# Patient Record
Sex: MALE | Race: WHITE | NOT HISPANIC OR LATINO
[De-identification: names, ages, dates, MRNs, and addresses within clinical notes are randomized per-mention and may not be internally consistent; named-entity substitution may affect disease eponyms.]

---

## 2017-03-15 ENCOUNTER — MESSAGE (OUTPATIENT)
Age: 67
End: 2017-03-15

## 2017-10-18 ENCOUNTER — APPOINTMENT (OUTPATIENT)
Age: 67
End: 2017-10-18
Payer: MEDICARE

## 2017-10-18 VITALS
WEIGHT: 244 LBS | HEIGHT: 73 IN | DIASTOLIC BLOOD PRESSURE: 86 MMHG | HEART RATE: 85 BPM | OXYGEN SATURATION: 96 % | SYSTOLIC BLOOD PRESSURE: 148 MMHG | BODY MASS INDEX: 32.34 KG/M2

## 2017-10-18 DIAGNOSIS — E73.9 LACTOSE INTOLERANCE, UNSPECIFIED: ICD-10-CM

## 2017-10-18 PROCEDURE — 99214 OFFICE O/P EST MOD 30 MIN: CPT

## 2017-10-19 LAB
ALBUMIN SERPL ELPH-MCNC: 4.5 G/DL
ALP BLD-CCNC: 42 U/L
ALT SERPL-CCNC: 68 U/L
ANION GAP SERPL CALC-SCNC: 11 MMOL/L
AST SERPL-CCNC: 38 U/L
BILIRUB DIRECT SERPL-MCNC: 0.3 MG/DL
BILIRUB SERPL-MCNC: 1.4 MG/DL
BUN SERPL-MCNC: 17 MG/DL
CALCIUM SERPL-MCNC: 9.2 MG/DL
CHLORIDE SERPL-SCNC: 103 MMOL/L
CO2 SERPL-SCNC: 28 MMOL/L
CREAT SERPL-MCNC: 1.09 MG/DL
GLUCOSE SERPL-MCNC: 114 MG/DL
HAV IGG+IGM SER QL: REACTIVE
POTASSIUM SERPL-SCNC: 4.6 MMOL/L
PROT SERPL-MCNC: 7.4 G/DL
SODIUM SERPL-SCNC: 142 MMOL/L

## 2017-12-19 ENCOUNTER — APPOINTMENT (OUTPATIENT)
Age: 67
End: 2017-12-19
Payer: MEDICARE

## 2017-12-19 PROCEDURE — 91200 LIVER ELASTOGRAPHY: CPT

## 2018-07-25 ENCOUNTER — NON-APPOINTMENT (OUTPATIENT)
Age: 68
End: 2018-07-25

## 2018-07-25 ENCOUNTER — APPOINTMENT (OUTPATIENT)
Dept: INTERNAL MEDICINE | Facility: CLINIC | Age: 68
End: 2018-07-25
Payer: MEDICARE

## 2018-07-25 VITALS
WEIGHT: 240 LBS | BODY MASS INDEX: 31.81 KG/M2 | RESPIRATION RATE: 15 BRPM | SYSTOLIC BLOOD PRESSURE: 123 MMHG | DIASTOLIC BLOOD PRESSURE: 68 MMHG | HEIGHT: 73 IN | TEMPERATURE: 97.8 F

## 2018-07-25 VITALS — HEART RATE: 75 BPM

## 2018-07-25 DIAGNOSIS — D36.9 BENIGN NEOPLASM, UNSPECIFIED SITE: ICD-10-CM

## 2018-07-25 PROCEDURE — 90471 IMMUNIZATION ADMIN: CPT | Mod: GY

## 2018-07-25 PROCEDURE — 90715 TDAP VACCINE 7 YRS/> IM: CPT | Mod: GY

## 2018-07-25 PROCEDURE — G0402 INITIAL PREVENTIVE EXAM: CPT

## 2018-07-25 PROCEDURE — 36415 COLL VENOUS BLD VENIPUNCTURE: CPT

## 2018-07-25 PROCEDURE — G0403: CPT

## 2018-07-25 NOTE — PHYSICAL EXAM
[No Acute Distress] : no acute distress [Normal Sclera/Conjunctiva] : normal sclera/conjunctiva [Normal Outer Ear/Nose] : the outer ears and nose were normal in appearance [Thyroid Normal, No Nodules] : the thyroid was normal and there were no nodules present [Clear to Auscultation] : lungs were clear to auscultation bilaterally [Normal Rate] : normal rate  [Regular Rhythm] : with a regular rhythm [No Carotid Bruits] : no carotid bruits [No Edema] : there was no peripheral edema [No Masses] : no abdominal mass palpated [Normal Posterior Cervical Nodes] : no posterior cervical lymphadenopathy [Normal Anterior Cervical Nodes] : no anterior cervical lymphadenopathy [Grossly Normal Strength/Tone] : grossly normal strength/tone [No Rash] : no rash [Coordination Grossly Intact] : coordination grossly intact [Normal Insight/Judgement] : insight and judgment were intact [Kyphosis] : no kyphosis

## 2018-07-25 NOTE — HEALTH RISK ASSESSMENT
[No falls in past year] : Patient reported no falls in the past year [HIV test declined] : HIV test declined [Fully functional (bathing, dressing, toileting, transferring, walking, feeding)] : Fully functional (bathing, dressing, toileting, transferring, walking, feeding) [Fully functional (using the telephone, shopping, preparing meals, housekeeping, doing laundry, using] : Fully functional and needs no help or supervision to perform IADLs (using the telephone, shopping, preparing meals, housekeeping, doing laundry, using transportation, managing medications and managing finances) [Discussed at today's visit] : Advance Directives Discussed at today's visit

## 2018-07-25 NOTE — PLAN
[FreeTextEntry1] : Check chol and A1C today.\par HTN controlled on losartan-hctz.\par RTO 4 mos fasting.\par HM is UTD.\par

## 2018-07-25 NOTE — HISTORY OF PRESENT ILLNESS
[FreeTextEntry1] : physical [de-identified] : Seeing endo about thyroid nodule.\ophelia Has derm appt today.\ophelia Had colonoscopy in May and found tubular adenoma.\par His son is having his first grandchild in September.

## 2018-07-30 ENCOUNTER — MESSAGE (OUTPATIENT)
Age: 68
End: 2018-07-30

## 2018-07-30 LAB
25(OH)D3 SERPL-MCNC: 43.4 NG/ML
ALBUMIN SERPL ELPH-MCNC: 4.8 G/DL
ALP BLD-CCNC: 53 U/L
ALT SERPL-CCNC: 47 U/L
ANION GAP SERPL CALC-SCNC: 17 MMOL/L
APPEARANCE: CLEAR
AST SERPL-CCNC: 36 U/L
BACTERIA: NEGATIVE
BASOPHILS # BLD AUTO: 0.02 K/UL
BASOPHILS NFR BLD AUTO: 0.3 %
BILIRUB SERPL-MCNC: 2.6 MG/DL
BILIRUBIN URINE: NEGATIVE
BLOOD URINE: NEGATIVE
BUN SERPL-MCNC: 31 MG/DL
CALCIUM SERPL-MCNC: 9 MG/DL
CHLORIDE SERPL-SCNC: 99 MMOL/L
CHOLEST SERPL-MCNC: 169 MG/DL
CHOLEST/HDLC SERPL: 3.9 RATIO
CO2 SERPL-SCNC: 25 MMOL/L
COLOR: YELLOW
CREAT SERPL-MCNC: 1.17 MG/DL
EOSINOPHIL # BLD AUTO: 0.14 K/UL
EOSINOPHIL NFR BLD AUTO: 2.2 %
GLUCOSE QUALITATIVE U: NEGATIVE MG/DL
GLUCOSE SERPL-MCNC: 115 MG/DL
HBA1C MFR BLD HPLC: 5.2 %
HCT VFR BLD CALC: 44.6 %
HDLC SERPL-MCNC: 43 MG/DL
HGB BLD-MCNC: 14.8 G/DL
IMM GRANULOCYTES NFR BLD AUTO: 0.3 %
KETONES URINE: NEGATIVE
LDLC SERPL CALC-MCNC: 108 MG/DL
LEUKOCYTE ESTERASE URINE: NEGATIVE
LYMPHOCYTES # BLD AUTO: 2.21 K/UL
LYMPHOCYTES NFR BLD AUTO: 34.1 %
MAN DIFF?: NORMAL
MCHC RBC-ENTMCNC: 31.3 PG
MCHC RBC-ENTMCNC: 33.2 GM/DL
MCV RBC AUTO: 94.3 FL
MICROSCOPIC-UA: NORMAL
MONOCYTES # BLD AUTO: 0.59 K/UL
MONOCYTES NFR BLD AUTO: 9.1 %
NEUTROPHILS # BLD AUTO: 3.51 K/UL
NEUTROPHILS NFR BLD AUTO: 54 %
NITRITE URINE: NEGATIVE
PH URINE: 5.5
PLATELET # BLD AUTO: 241 K/UL
POTASSIUM SERPL-SCNC: 4.2 MMOL/L
PROT SERPL-MCNC: 7.3 G/DL
PROTEIN URINE: NEGATIVE MG/DL
PSA SERPL-MCNC: 0.89 NG/ML
RBC # BLD: 4.73 M/UL
RBC # FLD: 14.1 %
RED BLOOD CELLS URINE: 1 /HPF
SODIUM SERPL-SCNC: 141 MMOL/L
SPECIFIC GRAVITY URINE: 1.02
SQUAMOUS EPITHELIAL CELLS: 0 /HPF
TRIGL SERPL-MCNC: 92 MG/DL
TSH SERPL-ACNC: 2.92 UIU/ML
UROBILINOGEN URINE: NEGATIVE MG/DL
VIT B12 SERPL-MCNC: 439 PG/ML
WBC # FLD AUTO: 6.49 K/UL
WHITE BLOOD CELLS URINE: 0 /HPF

## 2018-08-01 ENCOUNTER — TRANSCRIPTION ENCOUNTER (OUTPATIENT)
Age: 68
End: 2018-08-01

## 2018-09-12 ENCOUNTER — APPOINTMENT (OUTPATIENT)
Dept: HEPATOLOGY | Facility: CLINIC | Age: 68
End: 2018-09-12
Payer: MEDICARE

## 2018-09-12 VITALS
DIASTOLIC BLOOD PRESSURE: 83 MMHG | HEART RATE: 72 BPM | BODY MASS INDEX: 31.68 KG/M2 | OXYGEN SATURATION: 99 % | WEIGHT: 239 LBS | SYSTOLIC BLOOD PRESSURE: 162 MMHG | HEIGHT: 73 IN | TEMPERATURE: 98.1 F

## 2018-09-12 PROCEDURE — 99214 OFFICE O/P EST MOD 30 MIN: CPT

## 2018-11-06 ENCOUNTER — FORM ENCOUNTER (OUTPATIENT)
Age: 68
End: 2018-11-06

## 2018-11-07 ENCOUNTER — APPOINTMENT (OUTPATIENT)
Dept: ULTRASOUND IMAGING | Facility: CLINIC | Age: 68
End: 2018-11-07
Payer: MEDICARE

## 2018-11-07 ENCOUNTER — OUTPATIENT (OUTPATIENT)
Dept: OUTPATIENT SERVICES | Facility: HOSPITAL | Age: 68
LOS: 1 days | End: 2018-11-07
Payer: MEDICARE

## 2018-11-07 DIAGNOSIS — Z00.8 ENCOUNTER FOR OTHER GENERAL EXAMINATION: ICD-10-CM

## 2018-11-07 PROCEDURE — 76700 US EXAM ABDOM COMPLETE: CPT | Mod: 26

## 2018-11-07 PROCEDURE — 76700 US EXAM ABDOM COMPLETE: CPT

## 2018-11-14 ENCOUNTER — APPOINTMENT (OUTPATIENT)
Dept: INTERNAL MEDICINE | Facility: CLINIC | Age: 68
End: 2018-11-14

## 2018-11-14 ENCOUNTER — APPOINTMENT (OUTPATIENT)
Dept: INTERNAL MEDICINE | Facility: CLINIC | Age: 68
End: 2018-11-14
Payer: MEDICARE

## 2018-11-14 VITALS
HEIGHT: 73 IN | TEMPERATURE: 97.7 F | SYSTOLIC BLOOD PRESSURE: 142 MMHG | WEIGHT: 244 LBS | DIASTOLIC BLOOD PRESSURE: 82 MMHG | BODY MASS INDEX: 32.34 KG/M2

## 2018-11-14 VITALS — SYSTOLIC BLOOD PRESSURE: 150 MMHG | DIASTOLIC BLOOD PRESSURE: 90 MMHG

## 2018-11-14 PROCEDURE — 36415 COLL VENOUS BLD VENIPUNCTURE: CPT

## 2018-11-14 PROCEDURE — 99214 OFFICE O/P EST MOD 30 MIN: CPT | Mod: 25

## 2018-11-14 NOTE — HISTORY OF PRESENT ILLNESS
[FreeTextEntry1] : HTN, fatty liver [de-identified] : Saw derm and had skin cancer behind left ear.  Getting MOHS.\par Plays tennis 3 times per week.\par Drinks one beer per day.\par Seeing endo about thyroid nodule.\par Had colonoscopy in May and found tubular adenoma.\par His son is had his first grandchild in September.

## 2018-11-14 NOTE — PLAN
[FreeTextEntry1] : HTN not controlled, will add metoprolol 25mg at night.  Take losartan in AM.  RTO 1 month.\par Check chol and liipids.  We spoke about getting his weight down to 200.

## 2018-11-18 LAB
ALBUMIN SERPL ELPH-MCNC: 4.5 G/DL
ALP BLD-CCNC: 43 U/L
ALT SERPL-CCNC: 41 U/L
ANION GAP SERPL CALC-SCNC: 12 MMOL/L
AST SERPL-CCNC: 31 U/L
BILIRUB SERPL-MCNC: 1.9 MG/DL
BUN SERPL-MCNC: 21 MG/DL
CALCIUM SERPL-MCNC: 9.3 MG/DL
CHLORIDE SERPL-SCNC: 101 MMOL/L
CHOLEST SERPL-MCNC: 158 MG/DL
CHOLEST/HDLC SERPL: 4 RATIO
CO2 SERPL-SCNC: 27 MMOL/L
CREAT SERPL-MCNC: 1.2 MG/DL
GLUCOSE SERPL-MCNC: 106 MG/DL
HDLC SERPL-MCNC: 40 MG/DL
LDLC SERPL CALC-MCNC: 98 MG/DL
POTASSIUM SERPL-SCNC: 4.7 MMOL/L
PROT SERPL-MCNC: 7.4 G/DL
SODIUM SERPL-SCNC: 140 MMOL/L
TRIGL SERPL-MCNC: 102 MG/DL

## 2018-11-19 ENCOUNTER — TRANSCRIPTION ENCOUNTER (OUTPATIENT)
Age: 68
End: 2018-11-19

## 2018-11-21 ENCOUNTER — RX RENEWAL (OUTPATIENT)
Age: 68
End: 2018-11-21

## 2018-12-17 ENCOUNTER — RX RENEWAL (OUTPATIENT)
Age: 68
End: 2018-12-17

## 2018-12-17 ENCOUNTER — APPOINTMENT (OUTPATIENT)
Dept: INTERNAL MEDICINE | Facility: CLINIC | Age: 68
End: 2018-12-17
Payer: MEDICARE

## 2018-12-17 VITALS
BODY MASS INDEX: 32.74 KG/M2 | WEIGHT: 247 LBS | HEIGHT: 73 IN | TEMPERATURE: 98 F | DIASTOLIC BLOOD PRESSURE: 76 MMHG | SYSTOLIC BLOOD PRESSURE: 158 MMHG

## 2018-12-17 VITALS — DIASTOLIC BLOOD PRESSURE: 75 MMHG | SYSTOLIC BLOOD PRESSURE: 130 MMHG

## 2018-12-17 PROCEDURE — 99213 OFFICE O/P EST LOW 20 MIN: CPT | Mod: 25

## 2018-12-17 NOTE — HISTORY OF PRESENT ILLNESS
[FreeTextEntry1] : HTN [de-identified] : Last visit BP elevated so we added metoprolol 25mg qhs.  No s/e.  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

## 2019-03-29 ENCOUNTER — APPOINTMENT (OUTPATIENT)
Dept: HEPATOLOGY | Facility: CLINIC | Age: 69
End: 2019-03-29
Payer: MEDICARE

## 2019-03-29 ENCOUNTER — RX RENEWAL (OUTPATIENT)
Age: 69
End: 2019-03-29

## 2019-03-29 VITALS
DIASTOLIC BLOOD PRESSURE: 79 MMHG | BODY MASS INDEX: 31.81 KG/M2 | HEIGHT: 73 IN | HEART RATE: 68 BPM | WEIGHT: 240 LBS | SYSTOLIC BLOOD PRESSURE: 154 MMHG | OXYGEN SATURATION: 100 %

## 2019-03-29 PROCEDURE — 99214 OFFICE O/P EST MOD 30 MIN: CPT

## 2019-03-29 NOTE — ASSESSMENT
[FreeTextEntry1] : The patient has evidence of fatty liver on abdominal ultrasound, which was recently performed in November of 2018.  The patient has maintained his weight at 240 pounds and I have encouraged him to continue to consider weight management in his daily.  Dietary suggestions.  Physical examination shows slight peripheral edema, and I have cautioned him to limit his sodium intake.  He will discuss his aspirin use with his primary care physician but has not had any bleeding complications, and aspirin may have some benefit in colon cancer prevention.\par \par I will obtain laboratory tests today, with a followup visit in 6 months. The patient's elevation of bilirubin is consistent with Gilbert's syndrome.

## 2019-03-29 NOTE — HISTORY OF PRESENT ILLNESS
[Moderate] : moderate in severity [Unchanged] : unchanged [Fatigue] : denies fatigue [Malaise] : denies malaise [Fever] : denies fever [Diffuse Joint Pain (Arthralgias)] : denies arthralgias [Muscle Aches, Generalized (Myalgias)] : denies myalgias [Yellow Skin Or Eyes (Jaundice)] : denies jaundice [Abdominal Pain] : denies abdominal pain [Urine Tests Nonspecific Abnormal Findings Biliuria] : denies dark urine [Light Colored Bowel Movement (Acholic Stools)] : denies pale stools [Insomnia] : denies insomnia [Skin: Rash] : denies rash [Itching (Pruritus)] : denies pruritus [Shortness Of Breath] : denies shortness of breath [Needlestick Exposure] : no needlestick exposure [Infected Sexual Partner] : no infected sexual partner [IV Drug Use] : no IV drug use [Tattoo] : no tattoos [Hemodialysis] : no hemodialysis [Transfusion before 1992] : no transfusion before 1992 [Transplant before 1992] : no transplant before 1992 [Incarceration] : no incarceration [Alcohol Abuse] : no alcohol abuse [Household Contact to HBV] : no household contact to HBV [Travel to Endemic Area] : no travel to an endemic area [Occupational Exposure] : no occupational exposure [Cocaine Use] : no cocaine use [de-identified] : This patient has had fatty liver identified on abdominal ultrasound, and this had a history of mild elevation of aminotransferase values.  His weight has fluctuated between 250 and 240 pounds with his current weight being 240 pounds.  The patient has remained physically active and does not drink significant amounts of alcohol.  Aminotransferase values had normalized on last assessment.\par \par The patient has had elevated bilirubin reportedly, all his life, mainly indirect hyperbilirubinemia.\par The patient does use aspirin supplements both for cardiovascular risk and also because of a prior history of a venous thrombosis in his leg.  He has not had any GI bleeding complications however, has had colonoscopy with a polyp detected.

## 2019-03-29 NOTE — PHYSICAL EXAM
[Liver Size (___ Cm)] : Liver size [unfilled] cm [Liver Palpable ___ Finger Breadths Below Costal Margin] : Liver edge was palpable [unfilled] finger breadths below costal margin [Non-Tender] : non-tender [Smooth] : smooth [General Appearance - Alert] : alert [General Appearance - In No Acute Distress] : in no acute distress [General Appearance - Well Nourished] : well nourished [General Appearance - Well Developed] : well developed [General Appearance - Well-Appearing] : healthy appearing [Sclera] : the sclera and conjunctiva were normal [Outer Ear] : the ears and nose were normal in appearance [Examination Of The Oral Cavity] : the lips and gums were normal [Neck Appearance] : the appearance of the neck was normal [Neck Cervical Mass (___cm)] : no neck mass was observed [Thyroid Diffuse Enlargement] : the thyroid was not enlarged [Respiration, Rhythm And Depth] : normal respiratory rhythm and effort [Exaggerated Use Of Accessory Muscles For Inspiration] : no accessory muscle use [Heart Rate And Rhythm] : heart rate was normal and rhythm regular [Heart Sounds] : normal S1 and S2 [Murmurs] : no murmurs [Bowel Sounds] : normal bowel sounds [Abdomen Soft] : soft [Abdomen Tenderness] : non-tender [] : no hepato-splenomegaly [Abdomen Mass (___ Cm)] : no abdominal mass palpated [Supraclavicular Lymph Nodes Enlarged Bilaterally] : supraclavicular [No CVA Tenderness] : no ~M costovertebral angle tenderness [No Spinal Tenderness] : no spinal tenderness [Abnormal Walk] : normal gait [Nail Clubbing] : no clubbing  or cyanosis of the fingernails [Musculoskeletal - Swelling] : no joint swelling seen [Skin Color & Pigmentation] : normal skin color and pigmentation [Skin Turgor] : normal skin turgor [No Focal Deficits] : no focal deficits [Oriented To Time, Place, And Person] : oriented to person, place, and time [Scleral Icterus] : No Scleral Icterus [Hepatojugular Reflux] : patient did not have a sustained hepatojugular reflux [Spider Angioma] : No spider angioma(s) were observed [Abdominal Bruit] : no abdominal bruit [Abdominal  Ascites] : no ascites [Ascites Fluid Wave] : no ascites fluid wave [Ascites Tense] : ascites is not tense [Ascites Shifting Dullness] : no shifting dullness of ascites [Splenomegaly] : no splenomegaly [Caput Medusae] : no caput medusae observed [Asterixis] : no asterixis observed [Jaundice] : No jaundice [Palmar Erythema] : no Palmar Erythema [Depression] : no depression [FreeTextEntry6] : karri on right hand [FreeTextEntry1] : sun damage

## 2019-04-01 LAB
ALBUMIN SERPL ELPH-MCNC: 4.4 G/DL
ALP BLD-CCNC: 47 U/L
ALT SERPL-CCNC: 47 U/L
ANION GAP SERPL CALC-SCNC: 11 MMOL/L
AST SERPL-CCNC: 35 U/L
BASOPHILS # BLD AUTO: 0.03 K/UL
BASOPHILS NFR BLD AUTO: 0.5 %
BILIRUB DIRECT SERPL-MCNC: 0.3 MG/DL
BILIRUB SERPL-MCNC: 1.2 MG/DL
BUN SERPL-MCNC: 20 MG/DL
CALCIUM SERPL-MCNC: 9.1 MG/DL
CHLORIDE SERPL-SCNC: 103 MMOL/L
CHOLEST SERPL-MCNC: 164 MG/DL
CHOLEST/HDLC SERPL: 4.1 RATIO
CO2 SERPL-SCNC: 28 MMOL/L
CREAT SERPL-MCNC: 1.04 MG/DL
EOSINOPHIL # BLD AUTO: 0.13 K/UL
EOSINOPHIL NFR BLD AUTO: 2.1 %
ESTIMATED AVERAGE GLUCOSE: 103 MG/DL
GLUCOSE SERPL-MCNC: 110 MG/DL
HBA1C MFR BLD HPLC: 5.2 %
HCT VFR BLD CALC: 43.6 %
HDLC SERPL-MCNC: 40 MG/DL
HGB BLD-MCNC: 14.8 G/DL
IMM GRANULOCYTES NFR BLD AUTO: 0.6 %
LDLC SERPL CALC-MCNC: 111 MG/DL
LYMPHOCYTES # BLD AUTO: 1.82 K/UL
LYMPHOCYTES NFR BLD AUTO: 28.8 %
MAN DIFF?: NORMAL
MCHC RBC-ENTMCNC: 31.8 PG
MCHC RBC-ENTMCNC: 33.9 GM/DL
MCV RBC AUTO: 93.8 FL
MONOCYTES # BLD AUTO: 0.58 K/UL
MONOCYTES NFR BLD AUTO: 9.2 %
NEUTROPHILS # BLD AUTO: 3.73 K/UL
NEUTROPHILS NFR BLD AUTO: 58.8 %
PLATELET # BLD AUTO: 243 K/UL
POTASSIUM SERPL-SCNC: 4.7 MMOL/L
PROT SERPL-MCNC: 7 G/DL
RBC # BLD: 4.65 M/UL
RBC # FLD: 13.2 %
SODIUM SERPL-SCNC: 142 MMOL/L
TRIGL SERPL-MCNC: 65 MG/DL
WBC # FLD AUTO: 6.33 K/UL

## 2019-04-24 ENCOUNTER — APPOINTMENT (OUTPATIENT)
Dept: INTERNAL MEDICINE | Facility: CLINIC | Age: 69
End: 2019-04-24
Payer: MEDICARE

## 2019-04-24 VITALS
TEMPERATURE: 97.8 F | DIASTOLIC BLOOD PRESSURE: 78 MMHG | SYSTOLIC BLOOD PRESSURE: 138 MMHG | WEIGHT: 240 LBS | HEIGHT: 73 IN | BODY MASS INDEX: 31.81 KG/M2

## 2019-04-24 DIAGNOSIS — Z23 ENCOUNTER FOR IMMUNIZATION: ICD-10-CM

## 2019-04-24 PROCEDURE — 99213 OFFICE O/P EST LOW 20 MIN: CPT | Mod: 25

## 2019-04-24 PROCEDURE — G0009: CPT

## 2019-04-24 PROCEDURE — 90732 PPSV23 VACC 2 YRS+ SUBQ/IM: CPT

## 2019-04-24 NOTE — HISTORY OF PRESENT ILLNESS
[FreeTextEntry1] : HTN [de-identified] : Just back from [place].\par Playing tennis many times per week competitively.\par Trying to eat healthy.  Wonders if he should stop avocados.\par

## 2019-04-24 NOTE — PHYSICAL EXAM
[Normal Sclera/Conjunctiva] : normal sclera/conjunctiva [No Acute Distress] : no acute distress [Normal Outer Ear/Nose] : the outer ears and nose were normal in appearance [Clear to Auscultation] : lungs were clear to auscultation bilaterally [Thyroid Normal, No Nodules] : the thyroid was normal and there were no nodules present [Normal Rate] : normal rate  [Regular Rhythm] : with a regular rhythm [No Carotid Bruits] : no carotid bruits [No Edema] : there was no peripheral edema [No Masses] : no abdominal mass palpated [Normal Posterior Cervical Nodes] : no posterior cervical lymphadenopathy [Normal Anterior Cervical Nodes] : no anterior cervical lymphadenopathy [Grossly Normal Strength/Tone] : grossly normal strength/tone [No Rash] : no rash [Coordination Grossly Intact] : coordination grossly intact [Normal Insight/Judgement] : insight and judgment were intact [Kyphosis] : no kyphosis

## 2019-04-24 NOTE — PLAN
[FreeTextEntry1] : Hypertension controlled. Continue losartan and metoprolol.\par We reviewed his blood work including cholesterol liver panel.\par Return to office in August for a physical.

## 2019-04-25 ENCOUNTER — TRANSCRIPTION ENCOUNTER (OUTPATIENT)
Age: 69
End: 2019-04-25

## 2019-06-03 ENCOUNTER — RX RENEWAL (OUTPATIENT)
Age: 69
End: 2019-06-03

## 2019-08-01 ENCOUNTER — TRANSCRIPTION ENCOUNTER (OUTPATIENT)
Age: 69
End: 2019-08-01

## 2019-08-21 ENCOUNTER — APPOINTMENT (OUTPATIENT)
Dept: INTERNAL MEDICINE | Facility: CLINIC | Age: 69
End: 2019-08-21

## 2019-09-03 ENCOUNTER — RX CHANGE (OUTPATIENT)
Age: 69
End: 2019-09-03

## 2019-09-23 ENCOUNTER — RX CHANGE (OUTPATIENT)
Age: 69
End: 2019-09-23

## 2019-10-04 ENCOUNTER — RX CHANGE (OUTPATIENT)
Age: 69
End: 2019-10-04

## 2019-10-07 ENCOUNTER — RX CHANGE (OUTPATIENT)
Age: 69
End: 2019-10-07

## 2019-10-25 ENCOUNTER — RX RENEWAL (OUTPATIENT)
Age: 69
End: 2019-10-25

## 2019-11-15 ENCOUNTER — RX RENEWAL (OUTPATIENT)
Age: 69
End: 2019-11-15

## 2019-12-30 ENCOUNTER — APPOINTMENT (OUTPATIENT)
Dept: INTERNAL MEDICINE | Facility: CLINIC | Age: 69
End: 2019-12-30
Payer: MEDICARE

## 2019-12-30 VITALS
OXYGEN SATURATION: 94 % | DIASTOLIC BLOOD PRESSURE: 78 MMHG | BODY MASS INDEX: 32.2 KG/M2 | HEIGHT: 73 IN | HEART RATE: 68 BPM | WEIGHT: 243 LBS | SYSTOLIC BLOOD PRESSURE: 130 MMHG | TEMPERATURE: 98.6 F

## 2019-12-30 VITALS — DIASTOLIC BLOOD PRESSURE: 70 MMHG | SYSTOLIC BLOOD PRESSURE: 126 MMHG

## 2019-12-30 PROCEDURE — 36415 COLL VENOUS BLD VENIPUNCTURE: CPT

## 2019-12-30 PROCEDURE — 99214 OFFICE O/P EST MOD 30 MIN: CPT | Mod: 25

## 2019-12-30 RX ORDER — OLOPATADINE HCL 1 MG/ML
0.1 SOLUTION/ DROPS OPHTHALMIC DAILY
Qty: 3 | Refills: 3 | Status: DISCONTINUED | COMMUNITY
Start: 2018-12-17 | End: 2019-12-30

## 2019-12-30 NOTE — HISTORY OF PRESENT ILLNESS
[FreeTextEntry1] : HTN, fatty liver follow-up [de-identified] : Patient comes for follow-up. Previously followed by Dr. Mccray.\par \par He is followed for elevated LFT's due to PRICE and has Gilbert's. Followed by hepatology. For repeat labs today. \par He has a mild cold but improving.\par He is leaving for [place] on 1/4 for 2 months, usually goes annually.\par

## 2019-12-30 NOTE — PHYSICAL EXAM
[No Acute Distress] : no acute distress [Normal Outer Ear/Nose] : the outer ears and nose were normal in appearance [Normal Sclera/Conjunctiva] : normal sclera/conjunctiva [Clear to Auscultation] : lungs were clear to auscultation bilaterally [Thyroid Normal, No Nodules] : the thyroid was normal and there were no nodules present [Normal Rate] : normal rate  [Regular Rhythm] : with a regular rhythm [No Edema] : there was no peripheral edema [No Masses] : no abdominal mass palpated [Normal Posterior Cervical Nodes] : no posterior cervical lymphadenopathy [Normal Anterior Cervical Nodes] : no anterior cervical lymphadenopathy [Grossly Normal Strength/Tone] : grossly normal strength/tone [No Rash] : no rash [Coordination Grossly Intact] : coordination grossly intact [Normal Insight/Judgement] : insight and judgment were intact [Well Developed] : well developed [Well-Appearing] : well-appearing [PERRL] : pupils equal round and reactive to light [EOMI] : extraocular movements intact [Normal Oropharynx] : the oropharynx was normal [Normal TMs] : both tympanic membranes were normal [Supple] : supple [No Lymphadenopathy] : no lymphadenopathy [No Respiratory Distress] : no respiratory distress  [Normal S1, S2] : normal S1 and S2 [No Murmur] : no murmur heard [Soft] : abdomen soft [Non Tender] : non-tender [Normal Gait] : normal gait [Normal Mood] : the mood was normal

## 2019-12-30 NOTE — PLAN
[FreeTextEntry1] : Hypertension controlled. Continue losartan-HCTZ and metoprolol.\par Fatty liver - mildly elevated ALT. Repeat LFT's today.\par \par Return to office in 4-5 months for annual.

## 2019-12-31 ENCOUNTER — TRANSCRIPTION ENCOUNTER (OUTPATIENT)
Age: 69
End: 2019-12-31

## 2019-12-31 LAB
ALBUMIN SERPL ELPH-MCNC: 4.5 G/DL
ALP BLD-CCNC: 47 U/L
ALT SERPL-CCNC: 48 U/L
ANION GAP SERPL CALC-SCNC: 14 MMOL/L
AST SERPL-CCNC: 39 U/L
BILIRUB SERPL-MCNC: 2.6 MG/DL
BUN SERPL-MCNC: 15 MG/DL
CALCIUM SERPL-MCNC: 9.3 MG/DL
CHLORIDE SERPL-SCNC: 101 MMOL/L
CO2 SERPL-SCNC: 25 MMOL/L
CREAT SERPL-MCNC: 1.03 MG/DL
GLUCOSE SERPL-MCNC: 112 MG/DL
POTASSIUM SERPL-SCNC: 4.4 MMOL/L
PROT SERPL-MCNC: 7.1 G/DL
SODIUM SERPL-SCNC: 140 MMOL/L

## 2020-03-11 ENCOUNTER — APPOINTMENT (OUTPATIENT)
Dept: HEPATOLOGY | Facility: CLINIC | Age: 70
End: 2020-03-11
Payer: MEDICARE

## 2020-03-11 ENCOUNTER — LABORATORY RESULT (OUTPATIENT)
Age: 70
End: 2020-03-11

## 2020-03-11 VITALS
DIASTOLIC BLOOD PRESSURE: 69 MMHG | TEMPERATURE: 98.2 F | BODY MASS INDEX: 31.14 KG/M2 | WEIGHT: 235 LBS | OXYGEN SATURATION: 95 % | HEIGHT: 73 IN | SYSTOLIC BLOOD PRESSURE: 110 MMHG | HEART RATE: 86 BPM

## 2020-03-11 PROCEDURE — 99213 OFFICE O/P EST LOW 20 MIN: CPT | Mod: 25,PD

## 2020-03-11 PROCEDURE — 36415 COLL VENOUS BLD VENIPUNCTURE: CPT | Mod: PD

## 2020-03-12 NOTE — ASSESSMENT
[FreeTextEntry1] : This patient has evidence of fatty liver and has continued alcohol intake.  I've concerns that his continued alcohol use.  May contribute to his liver disease, and I suggested alcohol abstinence and to maintain his 15 pound weight loss.\par \par The patient currently has a GI illness with decreased appetite, and an episode of vomiting, and suggested that he seek advice from his primary care physician.  Should the symptoms continue his grandson also had a acute illness with GI symptoms\par \par The patient will be given a followup appointment in 6 months to assess alcohol abstinence and maintenance of weight reduction.  I will obtain repeat laboratory tests today.  In 6 months.  Repeat fibroscan may be considered, which was performed in 2017.

## 2020-03-12 NOTE — PHYSICAL EXAM
[Liver Size (___ Cm)] : Liver size [unfilled] cm [General Appearance - Alert] : alert [General Appearance - In No Acute Distress] : in no acute distress [General Appearance - Well Nourished] : well nourished [General Appearance - Well Developed] : well developed [General Appearance - Well-Appearing] : healthy appearing [Outer Ear] : the ears and nose were normal in appearance [Examination Of The Oral Cavity] : the lips and gums were normal [Neck Appearance] : the appearance of the neck was normal [Neck Cervical Mass (___cm)] : no neck mass was observed [Thyroid Diffuse Enlargement] : the thyroid was not enlarged [Respiration, Rhythm And Depth] : normal respiratory rhythm and effort [Exaggerated Use Of Accessory Muscles For Inspiration] : no accessory muscle use [Heart Rate And Rhythm] : heart rate was normal and rhythm regular [Heart Sounds] : normal S1 and S2 [Murmurs] : no murmurs [Bowel Sounds] : normal bowel sounds [Abdomen Soft] : soft [Abdomen Tenderness] : non-tender [] : no hepato-splenomegaly [Abdomen Mass (___ Cm)] : no abdominal mass palpated [Supraclavicular Lymph Nodes Enlarged Bilaterally] : supraclavicular [No CVA Tenderness] : no ~M costovertebral angle tenderness [No Spinal Tenderness] : no spinal tenderness [Abnormal Walk] : normal gait [Nail Clubbing] : no clubbing  or cyanosis of the fingernails [Musculoskeletal - Swelling] : no joint swelling seen [Skin Color & Pigmentation] : normal skin color and pigmentation [Skin Turgor] : normal skin turgor [No Focal Deficits] : no focal deficits [Oriented To Time, Place, And Person] : oriented to person, place, and time [Scleral Icterus] : No Scleral Icterus [Hepatojugular Reflux] : patient did not have a sustained hepatojugular reflux [Spider Angioma] : No spider angioma(s) were observed [Abdominal Bruit] : no abdominal bruit [Abdominal  Ascites] : no ascites [Ascites Fluid Wave] : no ascites fluid wave [Ascites Tense] : ascites is not tense [Ascites Shifting Dullness] : no shifting dullness of ascites [Splenomegaly] : no splenomegaly [Caput Medusae] : no caput medusae observed [Liver Palpable ___ Finger Breadths Below Costal Margin] : was not palpable below costal margin [Asterixis] : no asterixis observed [Jaundice] : Jaundice [Palmar Erythema] : no Palmar Erythema [Depression] : no depression [FreeTextEntry6] : karri on right hand [FreeTextEntry1] : sun damage

## 2020-03-12 NOTE — HISTORY OF PRESENT ILLNESS
[Moderate] : moderate in severity [Unchanged] : unchanged [Fatigue] : fatigue worsened [Malaise] : denies malaise [Fever] : denies fever [Diffuse Joint Pain (Arthralgias)] : denies arthralgias [Muscle Aches, Generalized (Myalgias)] : denies myalgias [Yellow Skin Or Eyes (Jaundice)] : denies jaundice [Abdominal Pain] : denies abdominal pain [Urine Tests Nonspecific Abnormal Findings Biliuria] : denies dark urine [Light Colored Bowel Movement (Acholic Stools)] : denies pale stools [Insomnia] : denies insomnia [Skin: Rash] : denies rash [Itching (Pruritus)] : denies pruritus [Shortness Of Breath] : denies shortness of breath [Wt Loss ___ Lbs] : recent [unfilled] ~Upound(s) weight loss [Needlestick Exposure] : no needlestick exposure [Infected Sexual Partner] : no infected sexual partner [IV Drug Use] : no IV drug use [Tattoo] : no tattoos [Body Piercing] : no body piercing [Hemodialysis] : no hemodialysis [Transfusion before 1992] : no transfusion before 1992 [Transplant before 1992] : no transplant before 1992 [Incarceration] : no incarceration [Alcohol Abuse] : no alcohol abuse [Autoimmune Disorder] : no autoimmune disorder [Household Contact to HBV] : no household contact to HBV [Travel to Endemic Area] : no travel to an endemic area [Occupational Exposure] : no occupational exposure [Cocaine Use] : no cocaine use [Wt Gain ___ Lbs] : no recent weight gain [de-identified] : This patient has history of fatty liver identified on abdominal ultrasound and a history of elevated aminotransferase values.  His weight had been near 250 and over the past year.  He has reduced his weight to a current weight of 235 pounds.  Most recently, he took a trip to [place] and has been with his grandson, who had a diarrheal illness, and currently he has had GI upset with decreased appetite, and an episode of vomiting.  He denies cough, and is afebrile and the patient does have elevated bilirubin likely indirect hyperbilirubinemia, suggestive of Gilbert's syndrome.  This has been consistent on multiple lab determinations.\par \par The patient continues to drink alcohol, several beers per day.  And has not considered stopping his alcohol intake.

## 2020-03-12 NOTE — REVIEW OF SYSTEMS
[Feeling Poorly] : feeling poorly [Feeling Tired] : feeling tired [Recent Weight Loss (___ Lbs)] : recent [unfilled] ~Ulb weight loss [Vomiting] : vomiting [Negative] : Heme/Lymph [Fever] : no fever [FreeTextEntry7] : decreased appetite

## 2020-03-13 ENCOUNTER — INPATIENT (INPATIENT)
Facility: HOSPITAL | Age: 70
LOS: 1 days | Discharge: HOME | DRG: 444 | End: 2020-03-15
Attending: HOSPITALIST | Admitting: HOSPITALIST
Payer: MEDICARE

## 2020-03-13 VITALS
DIASTOLIC BLOOD PRESSURE: 71 MMHG | SYSTOLIC BLOOD PRESSURE: 148 MMHG | HEIGHT: 73 IN | TEMPERATURE: 98 F | OXYGEN SATURATION: 98 % | WEIGHT: 235.01 LBS | RESPIRATION RATE: 18 BRPM | HEART RATE: 71 BPM

## 2020-03-13 DIAGNOSIS — K72.00 ACUTE AND SUBACUTE HEPATIC FAILURE WITHOUT COMA: ICD-10-CM

## 2020-03-13 DIAGNOSIS — I10 ESSENTIAL (PRIMARY) HYPERTENSION: ICD-10-CM

## 2020-03-13 DIAGNOSIS — K81.0 ACUTE CHOLECYSTITIS: ICD-10-CM

## 2020-03-13 DIAGNOSIS — F10.10 ALCOHOL ABUSE, UNCOMPLICATED: ICD-10-CM

## 2020-03-13 DIAGNOSIS — R74.0 NONSPECIFIC ELEVATION OF LEVELS OF TRANSAMINASE AND LACTIC ACID DEHYDROGENASE [LDH]: ICD-10-CM

## 2020-03-13 DIAGNOSIS — N17.9 ACUTE KIDNEY FAILURE, UNSPECIFIED: ICD-10-CM

## 2020-03-13 DIAGNOSIS — Z02.9 ENCOUNTER FOR ADMINISTRATIVE EXAMINATIONS, UNSPECIFIED: ICD-10-CM

## 2020-03-13 DIAGNOSIS — Z29.9 ENCOUNTER FOR PROPHYLACTIC MEASURES, UNSPECIFIED: ICD-10-CM

## 2020-03-13 DIAGNOSIS — Z98.890 OTHER SPECIFIED POSTPROCEDURAL STATES: Chronic | ICD-10-CM

## 2020-03-13 LAB
ALBUMIN SERPL ELPH-MCNC: 3.6 G/DL — SIGNIFICANT CHANGE UP (ref 3.3–5)
ALBUMIN SERPL ELPH-MCNC: 4 G/DL — SIGNIFICANT CHANGE UP (ref 3.3–5)
ALBUMIN SERPL ELPH-MCNC: 4.4 G/DL
ALP BLD-CCNC: 202 U/L
ALP SERPL-CCNC: 254 U/L — HIGH (ref 40–120)
ALP SERPL-CCNC: 257 U/L — HIGH (ref 40–120)
ALT FLD-CCNC: 438 U/L — HIGH (ref 10–45)
ALT FLD-CCNC: 444 U/L — HIGH (ref 10–45)
ALT SERPL-CCNC: 314 U/L
ANION GAP SERPL CALC-SCNC: 16 MMOL/L — SIGNIFICANT CHANGE UP (ref 5–17)
ANION GAP SERPL CALC-SCNC: 16 MMOL/L — SIGNIFICANT CHANGE UP (ref 5–17)
ANION GAP SERPL CALC-SCNC: 17 MMOL/L
APAP SERPL-MCNC: <15 UG/ML — SIGNIFICANT CHANGE UP (ref 10–30)
APTT BLD: 25.1 SEC — LOW (ref 27.5–36.3)
AST SERPL-CCNC: 309 U/L
AST SERPL-CCNC: 347 U/L — HIGH (ref 10–40)
AST SERPL-CCNC: 357 U/L — HIGH (ref 10–40)
BASOPHILS # BLD AUTO: 0.03 K/UL
BASOPHILS # BLD AUTO: 0.03 K/UL — SIGNIFICANT CHANGE UP (ref 0–0.2)
BASOPHILS # BLD AUTO: 0.03 K/UL — SIGNIFICANT CHANGE UP (ref 0–0.2)
BASOPHILS NFR BLD AUTO: 0.1 %
BASOPHILS NFR BLD AUTO: 0.3 % — SIGNIFICANT CHANGE UP (ref 0–2)
BASOPHILS NFR BLD AUTO: 0.4 % — SIGNIFICANT CHANGE UP (ref 0–2)
BILIRUB SERPL-MCNC: 12.3 MG/DL — HIGH (ref 0.2–1.2)
BILIRUB SERPL-MCNC: 13.1 MG/DL — HIGH (ref 0.2–1.2)
BILIRUB SERPL-MCNC: 14 MG/DL
BUN SERPL-MCNC: 29 MG/DL
BUN SERPL-MCNC: 33 MG/DL — HIGH (ref 7–23)
BUN SERPL-MCNC: 34 MG/DL — HIGH (ref 7–23)
CALCIUM SERPL-MCNC: 9.1 MG/DL — SIGNIFICANT CHANGE UP (ref 8.4–10.5)
CALCIUM SERPL-MCNC: 9.3 MG/DL
CALCIUM SERPL-MCNC: 9.4 MG/DL — SIGNIFICANT CHANGE UP (ref 8.4–10.5)
CHLORIDE SERPL-SCNC: 94 MMOL/L — LOW (ref 96–108)
CHLORIDE SERPL-SCNC: 95 MMOL/L
CHLORIDE SERPL-SCNC: 97 MMOL/L — SIGNIFICANT CHANGE UP (ref 96–108)
CO2 SERPL-SCNC: 25 MMOL/L
CO2 SERPL-SCNC: 25 MMOL/L — SIGNIFICANT CHANGE UP (ref 22–31)
CO2 SERPL-SCNC: 27 MMOL/L — SIGNIFICANT CHANGE UP (ref 22–31)
CREAT ?TM UR-MCNC: 100 MG/DL — SIGNIFICANT CHANGE UP
CREAT SERPL-MCNC: 1.32 MG/DL — HIGH (ref 0.5–1.3)
CREAT SERPL-MCNC: 1.57 MG/DL — HIGH (ref 0.5–1.3)
CREAT SERPL-MCNC: 1.92 MG/DL
EOSINOPHIL # BLD AUTO: 0.07 K/UL — SIGNIFICANT CHANGE UP (ref 0–0.5)
EOSINOPHIL # BLD AUTO: 0.13 K/UL — SIGNIFICANT CHANGE UP (ref 0–0.5)
EOSINOPHIL # BLD AUTO: 0.19 K/UL
EOSINOPHIL NFR BLD AUTO: 0.8 %
EOSINOPHIL NFR BLD AUTO: 0.9 % — SIGNIFICANT CHANGE UP (ref 0–6)
EOSINOPHIL NFR BLD AUTO: 1.3 % — SIGNIFICANT CHANGE UP (ref 0–6)
ETHANOL SERPL-MCNC: SIGNIFICANT CHANGE UP MG/DL (ref 0–10)
GLUCOSE SERPL-MCNC: 106 MG/DL — HIGH (ref 70–99)
GLUCOSE SERPL-MCNC: 124 MG/DL
GLUCOSE SERPL-MCNC: 92 MG/DL — SIGNIFICANT CHANGE UP (ref 70–99)
HCT VFR BLD CALC: 44.3 % — SIGNIFICANT CHANGE UP (ref 39–50)
HCT VFR BLD CALC: 45.7 % — SIGNIFICANT CHANGE UP (ref 39–50)
HCT VFR BLD CALC: 47.7 %
HGB BLD-MCNC: 15.7 G/DL — SIGNIFICANT CHANGE UP (ref 13–17)
HGB BLD-MCNC: 15.8 G/DL
HGB BLD-MCNC: 16 G/DL — SIGNIFICANT CHANGE UP (ref 13–17)
IGA FLD-MCNC: 392 MG/DL — SIGNIFICANT CHANGE UP (ref 84–499)
IGG FLD-MCNC: 1109 MG/DL — SIGNIFICANT CHANGE UP (ref 610–1660)
IGM SERPL-MCNC: 121 MG/DL — SIGNIFICANT CHANGE UP (ref 35–242)
IMM GRANULOCYTES NFR BLD AUTO: 0.9 %
IMM GRANULOCYTES NFR BLD AUTO: 1.1 % — SIGNIFICANT CHANGE UP (ref 0–1.5)
IMM GRANULOCYTES NFR BLD AUTO: 1.2 % — SIGNIFICANT CHANGE UP (ref 0–1.5)
INR BLD: 1.05 RATIO — SIGNIFICANT CHANGE UP (ref 0.88–1.16)
KAPPA LC SER QL IFE: 3.11 MG/DL — HIGH (ref 0.33–1.94)
KAPPA/LAMBDA FREE LIGHT CHAIN RATIO, SERUM: 1.11 RATIO — SIGNIFICANT CHANGE UP (ref 0.26–1.65)
LAMBDA LC SER QL IFE: 2.81 MG/DL — HIGH (ref 0.57–2.63)
LYMPHOCYTES # BLD AUTO: 0.78 K/UL — LOW (ref 1–3.3)
LYMPHOCYTES # BLD AUTO: 0.86 K/UL — LOW (ref 1–3.3)
LYMPHOCYTES # BLD AUTO: 1.33 K/UL
LYMPHOCYTES # BLD AUTO: 10.7 % — LOW (ref 13–44)
LYMPHOCYTES # BLD AUTO: 8 % — LOW (ref 13–44)
LYMPHOCYTES NFR BLD AUTO: 5.5 %
MAGNESIUM SERPL-MCNC: 2.6 MG/DL — SIGNIFICANT CHANGE UP (ref 1.6–2.6)
MAN DIFF?: NORMAL
MCHC RBC-ENTMCNC: 31.6 PG — SIGNIFICANT CHANGE UP (ref 27–34)
MCHC RBC-ENTMCNC: 32 PG
MCHC RBC-ENTMCNC: 32.1 PG — SIGNIFICANT CHANGE UP (ref 27–34)
MCHC RBC-ENTMCNC: 33.1 GM/DL
MCHC RBC-ENTMCNC: 35 GM/DL — SIGNIFICANT CHANGE UP (ref 32–36)
MCHC RBC-ENTMCNC: 35.4 GM/DL — SIGNIFICANT CHANGE UP (ref 32–36)
MCV RBC AUTO: 90.3 FL — SIGNIFICANT CHANGE UP (ref 80–100)
MCV RBC AUTO: 90.6 FL — SIGNIFICANT CHANGE UP (ref 80–100)
MCV RBC AUTO: 96.6 FL
MONOCYTES # BLD AUTO: 1.04 K/UL — HIGH (ref 0–0.9)
MONOCYTES # BLD AUTO: 1.25 K/UL — HIGH (ref 0–0.9)
MONOCYTES # BLD AUTO: 1.81 K/UL
MONOCYTES NFR BLD AUTO: 12.8 % — SIGNIFICANT CHANGE UP (ref 2–14)
MONOCYTES NFR BLD AUTO: 13 % — SIGNIFICANT CHANGE UP (ref 2–14)
MONOCYTES NFR BLD AUTO: 7.5 %
NEUTROPHILS # BLD AUTO: 20.47 K/UL
NEUTROPHILS # BLD AUTO: 5.91 K/UL — SIGNIFICANT CHANGE UP (ref 1.8–7.4)
NEUTROPHILS # BLD AUTO: 7.43 K/UL — HIGH (ref 1.8–7.4)
NEUTROPHILS NFR BLD AUTO: 73.8 % — SIGNIFICANT CHANGE UP (ref 43–77)
NEUTROPHILS NFR BLD AUTO: 76.5 % — SIGNIFICANT CHANGE UP (ref 43–77)
NEUTROPHILS NFR BLD AUTO: 85.2 %
NRBC # BLD: 0 /100 WBCS — SIGNIFICANT CHANGE UP (ref 0–0)
NRBC # BLD: 0 /100 WBCS — SIGNIFICANT CHANGE UP (ref 0–0)
OSMOLALITY UR: 541 MOS/KG — SIGNIFICANT CHANGE UP (ref 300–900)
PLATELET # BLD AUTO: 232 K/UL — SIGNIFICANT CHANGE UP (ref 150–400)
PLATELET # BLD AUTO: 246 K/UL — SIGNIFICANT CHANGE UP (ref 150–400)
PLATELET # BLD AUTO: 264 K/UL
POTASSIUM SERPL-MCNC: 3 MMOL/L — LOW (ref 3.5–5.3)
POTASSIUM SERPL-MCNC: 3.3 MMOL/L — LOW (ref 3.5–5.3)
POTASSIUM SERPL-SCNC: 3 MMOL/L — LOW (ref 3.5–5.3)
POTASSIUM SERPL-SCNC: 3.3 MMOL/L — LOW (ref 3.5–5.3)
POTASSIUM SERPL-SCNC: 4.4 MMOL/L
PROT SERPL-MCNC: 6.9 G/DL
PROT SERPL-MCNC: 7.4 G/DL — SIGNIFICANT CHANGE UP (ref 6–8.3)
PROT SERPL-MCNC: 7.6 G/DL — SIGNIFICANT CHANGE UP (ref 6–8.3)
PROTHROM AB SERPL-ACNC: 12 SEC — SIGNIFICANT CHANGE UP (ref 10–12.9)
RBC # BLD: 4.89 M/UL — SIGNIFICANT CHANGE UP (ref 4.2–5.8)
RBC # BLD: 4.94 M/UL
RBC # BLD: 5.06 M/UL — SIGNIFICANT CHANGE UP (ref 4.2–5.8)
RBC # FLD: 13.5 % — SIGNIFICANT CHANGE UP (ref 10.3–14.5)
RBC # FLD: 13.7 % — SIGNIFICANT CHANGE UP (ref 10.3–14.5)
RBC # FLD: 14 %
SALICYLATES SERPL-MCNC: <2 MG/DL — LOW (ref 15–30)
SODIUM SERPL-SCNC: 137 MMOL/L
SODIUM SERPL-SCNC: 137 MMOL/L — SIGNIFICANT CHANGE UP (ref 135–145)
SODIUM SERPL-SCNC: 138 MMOL/L — SIGNIFICANT CHANGE UP (ref 135–145)
WBC # BLD: 8.01 K/UL — SIGNIFICANT CHANGE UP (ref 3.8–10.5)
WBC # BLD: 9.73 K/UL — SIGNIFICANT CHANGE UP (ref 3.8–10.5)
WBC # FLD AUTO: 24.04 K/UL
WBC # FLD AUTO: 8.01 K/UL — SIGNIFICANT CHANGE UP (ref 3.8–10.5)
WBC # FLD AUTO: 9.73 K/UL — SIGNIFICANT CHANGE UP (ref 3.8–10.5)

## 2020-03-13 PROCEDURE — 99223 1ST HOSP IP/OBS HIGH 75: CPT | Mod: GC

## 2020-03-13 PROCEDURE — 76705 ECHO EXAM OF ABDOMEN: CPT | Mod: 26,RT

## 2020-03-13 PROCEDURE — 99285 EMERGENCY DEPT VISIT HI MDM: CPT

## 2020-03-13 PROCEDURE — 74181 MRI ABDOMEN W/O CONTRAST: CPT | Mod: 26

## 2020-03-13 RX ORDER — POTASSIUM CHLORIDE 20 MEQ
40 PACKET (EA) ORAL ONCE
Refills: 0 | Status: COMPLETED | OUTPATIENT
Start: 2020-03-13 | End: 2020-03-13

## 2020-03-13 RX ORDER — SODIUM CHLORIDE 9 MG/ML
1000 INJECTION INTRAMUSCULAR; INTRAVENOUS; SUBCUTANEOUS ONCE
Refills: 0 | Status: COMPLETED | OUTPATIENT
Start: 2020-03-13 | End: 2020-03-13

## 2020-03-13 RX ORDER — HEPARIN SODIUM 5000 [USP'U]/ML
5000 INJECTION INTRAVENOUS; SUBCUTANEOUS EVERY 8 HOURS
Refills: 0 | Status: DISCONTINUED | OUTPATIENT
Start: 2020-03-13 | End: 2020-03-15

## 2020-03-13 RX ORDER — SODIUM CHLORIDE 9 MG/ML
1000 INJECTION, SOLUTION INTRAVENOUS
Refills: 0 | Status: DISCONTINUED | OUTPATIENT
Start: 2020-03-13 | End: 2020-03-15

## 2020-03-13 RX ORDER — PIPERACILLIN AND TAZOBACTAM 4; .5 G/20ML; G/20ML
3.38 INJECTION, POWDER, LYOPHILIZED, FOR SOLUTION INTRAVENOUS ONCE
Refills: 0 | Status: COMPLETED | OUTPATIENT
Start: 2020-03-13 | End: 2020-03-13

## 2020-03-13 RX ORDER — PIPERACILLIN AND TAZOBACTAM 4; .5 G/20ML; G/20ML
3.38 INJECTION, POWDER, LYOPHILIZED, FOR SOLUTION INTRAVENOUS EVERY 8 HOURS
Refills: 0 | Status: DISCONTINUED | OUTPATIENT
Start: 2020-03-13 | End: 2020-03-15

## 2020-03-13 RX ORDER — POTASSIUM CHLORIDE 20 MEQ
40 PACKET (EA) ORAL ONCE
Refills: 0 | Status: COMPLETED | OUTPATIENT
Start: 2020-03-13 | End: 2020-03-14

## 2020-03-13 RX ORDER — METOPROLOL TARTRATE 50 MG
25 TABLET ORAL DAILY
Refills: 0 | Status: DISCONTINUED | OUTPATIENT
Start: 2020-03-13 | End: 2020-03-15

## 2020-03-13 RX ADMIN — PIPERACILLIN AND TAZOBACTAM 25 GRAM(S): 4; .5 INJECTION, POWDER, LYOPHILIZED, FOR SOLUTION INTRAVENOUS at 21:32

## 2020-03-13 RX ADMIN — SODIUM CHLORIDE 1000 MILLILITER(S): 9 INJECTION INTRAMUSCULAR; INTRAVENOUS; SUBCUTANEOUS at 13:20

## 2020-03-13 RX ADMIN — Medication 40 MILLIEQUIVALENT(S): at 14:39

## 2020-03-13 RX ADMIN — SODIUM CHLORIDE 100 MILLILITER(S): 9 INJECTION, SOLUTION INTRAVENOUS at 21:33

## 2020-03-13 RX ADMIN — HEPARIN SODIUM 5000 UNIT(S): 5000 INJECTION INTRAVENOUS; SUBCUTANEOUS at 21:33

## 2020-03-13 RX ADMIN — PIPERACILLIN AND TAZOBACTAM 200 GRAM(S): 4; .5 INJECTION, POWDER, LYOPHILIZED, FOR SOLUTION INTRAVENOUS at 16:37

## 2020-03-13 RX ADMIN — Medication 25 MILLIGRAM(S): at 17:48

## 2020-03-13 RX ADMIN — SODIUM CHLORIDE 100 MILLILITER(S): 9 INJECTION, SOLUTION INTRAVENOUS at 17:41

## 2020-03-13 NOTE — H&P ADULT - PROBLEM SELECTOR PLAN 6
GOC: Full code  DVT prophylaxis: hep sub q  Diet: DASH GOC: Full code  DVT prophylaxis: hep sub q, reportedly taking  for DVT 10-15 years ago, but will hold for now in setting of RHINA  Diet: DASH

## 2020-03-13 NOTE — H&P ADULT - NSHPREVIEWOFSYSTEMS_GEN_ALL_CORE
REVIEW OF SYSTEMS:    CONSTITUTIONAL: No weakness, fevers or chills  EYES/ENT: No visual changes;  No vertigo or throat pain   NECK: No pain or stiffness  RESPIRATORY: No cough, wheezing, hemoptysis; No shortness of breath  CARDIOVASCULAR: No chest pain or palpitations  GASTROINTESTINAL: No abdominal or epigastric pain. No nausea, vomiting, or hematemesis; No diarrhea or constipation. No melena or hematochezia.  GENITOURINARY: No dysuria, frequency or hematuria  NEUROLOGICAL: No numbness or weakness  SKIN: No itching, rashes REVIEW OF SYSTEMS:    CONSTITUTIONAL:+ chills  EYES/ENT: yellowing eyes  NECK: No pain or stiffness  RESPIRATORY: runny nose  CARDIOVASCULAR: No chest pain or palpitations  GASTROINTESTINAL: generalized abd pain, nausea, NBNB vomiting, no diarrhea or constipation  GENITOURINARY: dark urine  NEUROLOGICAL: No numbness or weakness  SKIN: yellowing skin

## 2020-03-13 NOTE — H&P ADULT - PROBLEM SELECTOR PLAN 5
As per outpatient records, active conversation on alcohol cessation ongoing given h/o fatty liver disease  - last drink was in [place], no concern for withdrawal at this time

## 2020-03-13 NOTE — ED PROVIDER NOTE - ATTENDING CONTRIBUTION TO CARE
69 M w/ PMH of fatty liver, elevated AST and ALT, HTN, BPH, Gilbert's syndrome, p/w diarrhea and GI upset w/ decreased PO intake and episode of vomiting, pt drinks several beers per day was seen by GI and had abnormal labs. As a result, pt was sent in for RHINA, and elevated LFTs. 69 M w/ PMH of fatty liver, elevated AST and ALT, HTN, BPH, Gilbert's syndrome, p/w diarrhea and GI upset w/ decreased PO intake and episode of vomiting, pt drinks several beers per day was seen by GI and had abnormal labs. As a result, pt was sent in for RHINA, and elevated LFTs. he denies fevers, chills, vomiting. He states he had diarrhea, that has self resolved over the past 48 hours. he also reports last drink was 1 year ago.   pt is jaundiced on exam   I have reviewed the triage vital signs.  Const: Well-nourished, Well-developed, appearing stated age  Head: atraumatic, normocephalic  Eyes: no conjunctival injection and +scleral icterus  ENMT: Atraumatic external nose and ears, Moist mucus membranes  Neck: Symmetric, trachea midline,  CVS: +S1/S2, dorsalis pedis/radial pulse 2+ bilaterally  RESP: Unlabored respiratory effort, Clear to auscultation bilaterally  GI: Nontender/Nondistended, soft abdomen  MSK: Extremities w/o deformity or ttp   Skin: Warm, Dry w/ no rashes  Neuro: GCS=15, CNs II-XII grossly intact, motor in all 4 extremities equal, Sensation grossly intact   Psych: Awake, Alert, & Orientedx3;  Appropriate mood and affect, cooperative  concern for obstructive process ?intrinsic liver disease unlikely cholangitis

## 2020-03-13 NOTE — H&P ADULT - PROBLEM SELECTOR PLAN 1
Baseline AST/ALT wnl, bili only mildly elevated at baseline from outpatient records, but now found to be elevated, possibly in setting of ?viral hepatitis vs ?cholecystitis, given imaging findings vs ?malignancy  - Hepatology c/s appreciated, will f/u labs as following:   - Abd US showing gallbladder wall thickening, no evidence of stones,   - f/u MRCP  - f/u MR abdomen given peripacnreatic lymphadenopathy  - CMP/INR daily  - avoid liver toxic medications Baseline AST/ALT wnl, bili only mildly elevated at baseline from outpatient records, but now found to be elevated, possibly in setting of ?viral hepatitis vs ?cholecystitis, given imaging findings vs ?malignancy  - Hepatology c/s appreciated, will f/u labs as following: anti-mitochondrial, anti-smooth muscle, anti-LKM, anti-liver cytosol antibody, TEO, Immunoglobulin levels, Hep A, Hep B, Hep C, Hep E  antibody and DNA PCR CMV, EBV, and HSV PCR and IgM, Utox, tylenol level, ASA level, ethanol level  - Abd US showing gallbladder wall thickening, no evidence of stones,   - f/u MRCP  - f/u MR abdomen given peripancreatic lymphadenopathy  - CMP/INR daily  - avoid liver toxic medications Baseline AST/ALT wnl, bili only mildly elevated at baseline from outpatient records, but now found to be elevated, possibly in setting of ?viral hepatitis vs ?cholecystitis, given imaging findings vs ?malignancy  - Hepatology c/s appreciated, will f/u labs as following: anti-mitochondrial, anti-smooth muscle, anti-LKM, anti-liver cytosol antibody, TEO, Immunoglobulin levels, Hep A, Hep B, Hep C, Hep E  antibody and DNA PCR CMV, EBV, and HSV PCR and IgM, Utox, tylenol level, ASA level, ethanol level  - Abd US showing gallbladder wall thickening, no evidence of stones,   - f/u MRCP  - eval peripancreatic lymphadenopathy on MRCP  - CMP/INR daily  - avoid liver toxic medications

## 2020-03-13 NOTE — H&P ADULT - PROBLEM SELECTOR PLAN 3
Found to have gallbladder wall thickening on Abd US and distended gallbladder  - f/u MRCP  - f/u Surg c/s if MRCP positive  - continue to monitor Found to have gallbladder wall thickening on Abd US and distended gallbladder  - f/u MRCP  - f/u Surg vs GI c/s pending MRCP results  - c/w empiric treatment w/ zosyn  - continue to monitor Found to have gallbladder wall thickening on Abd US and distended gallbladder  - f/u MRCP  - f/u Surg eval   - c/w empiric treatment w/ zosyn  - continue to monitor Found to have gallbladder wall thickening on Abd US and distended gallbladder  - f/u MRCP  - c/w empiric treatment w/ zosyn  - continue to monitor

## 2020-03-13 NOTE — PATIENT PROFILE ADULT - FUNCTIONAL SCREEN CURRENT LEVEL: DRESSING, MLM
Problem: Cardiac Surgery (Adult)  Goal: Signs and Symptoms of Listed Potential Problems Will be Absent, Minimized or Managed (Cardiac Surgery)  Signs and symptoms of listed potential problems will be absent, minimized or managed by discharge/transition of care (reference Cardiac Surgery (Adult) CPG).   Outcome: Improving    D/I/A: POD #1 CABGx3. Sedation weaned down this AM, pt follow commands and tolerated being on PS trial for 1hr. ABG post PS trial 7.46, 46, 127, 32, 97. Pt extubated at 1025 on 4lpm NC. IS up to 1000ml, LS coarse, and diminished catherine lower lobes.   Epi gtt weaned off. Pt passed bedside swallow eval and had clear liquid diet order. CT output averaged 30cc/hr. Up to chair with assist x2 for several hours today. Family visit and updated. Pain well controlled with prn dilaudid and oxycodone.      P: Continue to monitor/assess pt, provide pain management, contact MD with questions/concerns.     Dave Garcia  RN, BSN       0 = independent

## 2020-03-13 NOTE — H&P ADULT - HISTORY OF PRESENT ILLNESS
Pt is a 69 M w/ PMH of fatty liver, elevated AST and ALT, HTN, BPH, Gilbert's syndrome presenting from the clinic for abnormal lab findings. Pt is a 69 M w/ PMH of fatty liver, elevated AST and ALT, HTN, BPH, Gilbert's syndrome presenting from the clinic for abnormal lab findings. Pt reports he recently came from [place] for vacation 2 weeks ago and was in good health during that time, without any exposure to sick contacts. When pt returned, pt was also in good health until he visited his grandson last Thursday and since then he was having symptoms of nausea and had multiple episodes of NBNB vomiting associated with some generalized abdominal pain, which was present for 3 days then resolved. Denies eating any strange food while in [place]. Pt was also exhibiting some symptoms of chills, rhinitis but no coughs or fevers. At  this time, pt also noted to have some yellowing of the skin and eyes. Pt presented to his routine hepatology visit with Dr. Vasquez where he was found to have Crt of 1.92, AST/ALT of 309/314, total bili of 14, Alk phos in 200s. He was notified to urgently go to the ED after these findings. Otherwise, pt denied chest p/p, sob, diarrhea, constipation, or musculoskeletal pain. Of note, pt has been actively trying to lose weight the past 3 months for his fatty liver disease. Pt reports drinking about 2 beers a night, but denies excessive drinking.     At the ED, vitals wnl, CBC wnl, CMP significant for Crt of 1.5, AST/ALT of 357/438, Alk phos of 254, total bili of 13, direct 9.7, Lipase 282, Abd US showing Diffuse hepatic steatosis, Distended gallbladder with diffuse wall thickening and hyperlipidemia. Pt was given 1L NS. Pt admitted to medicine for further evaluation.

## 2020-03-13 NOTE — H&P ADULT - PROBLEM SELECTOR PLAN 7
Problem: Discharge planning issues. Plan; Transitions of Care Status:  1. Name of PCP: Jose L (hepatologist)  2. PCP contacted on Admission: []Y [X]N sent via phone call  3. PCP contacted at Discharge: []Y []N  4. Post-Discharge Appointment Date and Location:   5. Summary of Handoff given to PCP:

## 2020-03-13 NOTE — H&P ADULT - NSICDXPASTMEDICALHX_GEN_ALL_CORE_FT
PAST MEDICAL HISTORY:  BPH (benign prostatic hyperplasia)     Fatty liver     Gilbert disease     Hypertension

## 2020-03-13 NOTE — ED ADULT NURSE NOTE - NSIMPLEMENTINTERV_GEN_ALL_ED
Implemented All Universal Safety Interventions:  [place] to call system. Call bell, personal items and telephone within reach. Instruct patient to call for assistance. Room bathroom lighting operational. Non-slip footwear when patient is off stretcher. Physically safe environment: no spills, clutter or unnecessary equipment. Stretcher in lowest position, wheels locked, appropriate side rails in place.

## 2020-03-13 NOTE — ED ADULT TRIAGE NOTE - CHIEF COMPLAINT QUOTE
Pt sent in by PMD fo abnormal liver and kidney lab results done 2 days ago; c/o nausea, vomiting, anorexia, abdominal pain since last Friday

## 2020-03-13 NOTE — ED PROVIDER NOTE - PHYSICAL EXAMINATION
Cornell PINEDA MD PGY2:   PHYSICAL EXAM:    GENERAL: NAD, well-developed  HEENT:  Atraumatic, Normocephalic  CHEST/LUNG: Chest rise equal bilaterally. CTAB.   HEART: Regular rate and rhythm  ABDOMEN: Mild RUQ TTP.   EXTREMITIES:  2+ Peripheral Pulses.  PSYCH: A&Ox3  SKIN: Yellow

## 2020-03-13 NOTE — H&P ADULT - NSHPLABSRESULTS_GEN_ALL_CORE
LABS: Personally reviewed labs, imaging, and ECG                          16.0   9.73  )-----------( 246      ( 13 Mar 2020 12:40 )             45.7       03-13    137  |  94<L>  |  34<H>  ----------------------------<  106<H>  3.0<L>   |  27  |  1.57<H>    Ca    9.4      13 Mar 2020 12:40    TPro  7.6  /  Alb  4.0  /  TBili  13.1<H>  /  DBili  9.7<H>  /  AST  357<H>  /  ALT  438<H>  /  AlkPhos  254<H>  03-13       LIVER FUNCTIONS - ( 13 Mar 2020 12:40 )  Alb: 4.0 g/dL / Pro: 7.6 g/dL / ALK PHOS: 254 U/L / ALT: 438 U/L / AST: 357 U/L / GGT: x              Lactate Trend    CAPILLARY BLOOD GLUCOSE    RADIOLOGY & ADDITIONAL TESTS:     < from: US Abdomen Upper Quadrant Right (03.13.20 @ 14:51) >    IMPRESSION:     Diffuse hepatic steatosis.    Distended gallbladder with diffuse wall thickening and hyperlipidemia. No evidence of stones or pericholecystic fluid. Correlation for cholecystitis recommended.    1.7 cm pancreatic cyst. Peripancreatic lymph nodes are also seen.  MRI recommended for further evaluation.    Findings discussed with  following the study and 3/13/2020.    < end of copied text >

## 2020-03-13 NOTE — CONSULT NOTE ADULT - ATTENDING COMMENTS
Patient was seen and examined with hepatology team on rounds on 3/14/2020.  A 69 year-old man with alcohol use disorder, fatty liver, abnormal liver enzymes, Gilbert's syndrome was seen for worsening liver tests and jaundice.    He admits to alcohol use while in [place] about 2 weeks ago. subsequently developed nausea, vomiting and diarrhea which resolved, and was seen for jaundice and worsening liver tests.   His liver tests downtrended,  TB 13.1 to 8.0, liver enzymes remained elevated, Cr improved to 0.93. MRCP and abdominal MRI reported fatty liver, no bile duct obstruction.  He may have acute hepatitis from viral or other infection given recent travel history in the setting of fatty liver and alcohol use disorder., less likely alcoholic hepatitis.   Will recommend workup for viral infection as listed per GI fellow including HEV IgM, toxicology, trend liver tests, avoid hepatotoxins, diarrhea workup if it recurs

## 2020-03-13 NOTE — ED ADULT NURSE NOTE - NS PRO PASSIVE SMOKE EXP
[Alert] : alert [No Acute Distress] : no acute distress [Normocephalic] : normocephalic [Conjunctivae with no discharge] : conjunctivae with no discharge [PERRL] : PERRL [EOMI Bilateral] : EOMI bilateral [Auricles Well Formed] : auricles well formed [Clear Tympanic membranes with present light reflex and bony landmarks] : clear tympanic membranes with present light reflex and bony landmarks [No Discharge] : no discharge Unknown [Nares Patent] : nares patent [Pink Nasal Mucosa] : pink nasal mucosa [Palate Intact] : palate intact [Nonerythematous Oropharynx] : nonerythematous oropharynx [Supple, full passive range of motion] : supple, full passive range of motion [No Palpable Masses] : no palpable masses [Symmetric Chest Rise] : symmetric chest rise [Clear to Ausculatation Bilaterally] : clear to auscultation bilaterally [Regular Rate and Rhythm] : regular rate and rhythm [Normal S1, S2 present] : normal S1, S2 present [No Murmurs] : no murmurs [+2 Femoral Pulses] : +2 femoral pulses [Soft] : soft [NonTender] : non tender [Non Distended] : non distended [Normoactive Bowel Sounds] : normoactive bowel sounds [No Hepatomegaly] : no hepatomegaly [No Splenomegaly] : no splenomegaly [Testicles Descended Bilaterally] : testicles descended bilaterally [Patent] : patent [No fissures] : no fissures [No Abnormal Lymph Nodes Palpated] : no abnormal lymph nodes palpated [No Gait Asymmetry] : no gait asymmetry [No pain or deformities with palpation of bone, muscles, joints] : no pain or deformities with palpation of bone, muscles, joints [Normal Muscle Tone] : normal muscle tone [Straight] : straight [+2 Patella DTR] : +2 patella DTR [Cranial Nerves Grossly Intact] : cranial nerves grossly intact [No Rash or Lesions] : no rash or lesions [FreeTextEntry2] : min tip to L side, easily moves it all the way to R side.  [FreeTextEntry5] : RR++

## 2020-03-13 NOTE — ED ADULT NURSE NOTE - OBJECTIVE STATEMENT
70 yo male with hx of HTN presents to the ED via waiting room from PMD's office complaining of abnormal lab result. Patient was in [place] for 2 months, came back last week. Patient began having signs and symptoms of malaise, fatigue, and "extreme exhaustion" since last Friday. Patient states he has been following up with PMD regarding liver functions because he usually has an elevated liver/kidney function and states that "it has been normalizing for 20-30 years." States he has been having decreased appetite since last Friday as well, endorses having multiple vomiting episodes last episode 3 days ago. Patient appears to be jaundiced, yellowing noted of scleras and upper extremities. Denies headache, dizziness, vision changes, chest pain, shortness of breath, diarrhea, fevers, chills, dysuria, hematuria, recent illness or fall.

## 2020-03-13 NOTE — H&P ADULT - ASSESSMENT
Pt is a 69 M w/ PMH of fatty liver, elevated AST and ALT, HTN, BPH, Gilbert's syndrome presenting from the clinic for abnormal lab findings. Pt is a 69 M w/ PMH of fatty liver, elevated AST and ALT, HTN, BPH, Gilbert's syndrome presenting from the clinic for abnormal lab findings. Found to have elevated AST/ALT, hyperbilirbinemia, Crt 1.5, w/ distended gallbladder w/ wall thickening, cystic lesion in pancreas w/ peripancreatic lymphadenopathy admitted to medicine for further evaluation of acute liver failure

## 2020-03-13 NOTE — ED PROVIDER NOTE - CLINICAL SUMMARY MEDICAL DECISION MAKING FREE TEXT BOX
Cornell PINEDA MD PGY2: 69 M here with hyperbilirubinemia with the symptoms in HPI. Will repeat labs. Depending on pattern of LFTs if suggestive of obstructive or intrinsic liver injury, will obtain US RUQ. Will reassess. Patient currently comfortable.

## 2020-03-13 NOTE — H&P ADULT - NSHPPHYSICALEXAM_GEN_ALL_CORE
PHYSICAL EXAM:  GENERAL: NAD, lying in bed comfortably  HEAD:  Atraumatic, Normocephalic  EYES: EOMI, PERRLA, conjunctiva and sclera clear  ENT: Moist mucous membranes  NECK: Supple, No JVD  CHEST/LUNG: Clear to auscultation bilaterally; No rales, rhonchi, wheezing, or rubs. Unlabored respirations  HEART: Regular rate and rhythm; No murmurs, rubs, or gallops  ABDOMEN: Bowel sounds present; Soft, Nontender, Nondistended   EXTREMITIES:  2+ Peripheral Pulses, brisk capillary refill. No clubbing, cyanosis, or edema  NERVOUS SYSTEM:  Alert & Oriented X3, speech clear. No deficits   MSK: FROM all 4 extremities, full and equal strength  SKIN: No rashes or lesions PHYSICAL EXAM:  GENERAL: NAD, lying in bed comfortably  HEAD:  Atraumatic, Normocephalic  EYES: EOMI, PERRLA, scleral icterus  ENT: Moist mucous membranes  NECK: Supple, No JVD  CHEST/LUNG: Clear to auscultation bilaterally; No rales, rhonchi, wheezing, or rubs. Unlabored respirations  HEART: Regular rate and rhythm; No murmurs, rubs, or gallops  ABDOMEN: Bowel sounds present; Soft, Nontender, Nondistended, no organomegaly, neg wagner sign  EXTREMITIES:  2+ Peripheral Pulses, brisk capillary refill. No clubbing, cyanosis, or edema,   NERVOUS SYSTEM:  Alert & Oriented X3, speech clear. No deficits, no asterixis  MSK: FROM all 4 extremities, full and equal strength  SKIN: No rashes or lesions, no spider angiomas PHYSICAL EXAM:  Vital Signs (24 Hrs):  T(C): 36.5 (03-13-20 @ 16:27), Max: 36.6 (03-13-20 @ 12:30)  HR: 78 (03-13-20 @ 16:27) (65 - 78)  BP: 139/81 (03-13-20 @ 16:27) (121/84 - 156/83)  RR: 16 (03-13-20 @ 15:54) (16 - 18)  SpO2: 100% (03-13-20 @ 16:27) (96% - 100%)  Wt(kg): --  GENERAL: NAD, lying in bed comfortably  HEAD:  Atraumatic, Normocephalic  EYES: EOMI, PERRLA, scleral icterus  ENT: Moist mucous membranes  NECK: Supple, No JVD  CHEST/LUNG: Clear to auscultation bilaterally; No rales, rhonchi, wheezing, or rubs. Unlabored respirations  HEART: Regular rate and rhythm; No murmurs, rubs, or gallops  ABDOMEN: Bowel sounds present; Soft, Nontender, Nondistended, no organomegaly, neg wagner sign  EXTREMITIES:  2+ Peripheral Pulses, brisk capillary refill. No clubbing, cyanosis, or edema,   NERVOUS SYSTEM:  Alert & Oriented X3, speech clear. No deficits, no asterixis  MSK: FROM all 4 extremities, full and equal strength  SKIN: No rashes or lesions, no spider angiomas

## 2020-03-13 NOTE — H&P ADULT - PROBLEM SELECTOR PLAN 2
Possibly in setting of Acute liver failure, likely prerenal in nature, currently improved from outpatient Crt of 1.92  - F/u urine lytes  - s/p 1L fluids, improved  - daily BMP Possibly in setting of Acute liver failure, likely prerenal in nature, currently improved from outpatient Crt of 1.92  - F/u urine lytes  - s/p 1L fluids, improved  - daily BMP  - c/w IVF  - hold losartan and hctz

## 2020-03-13 NOTE — CONSULT NOTE ADULT - ASSESSMENT
Impression  1) Elevated LFTs - in the setting of recent GI upset with a history of [place], possibly a result of that.  DDx includes infectious hepatitis as well.  Biliary obstruction possible but less likely but will need US and labs to evaluate    Recommendations   - send GI-PCR if diarrhea continues   - please send auto immune work up: anti-mitochondrial, anti-smooth muscle, anti-LKM, anti-liver cytosol antibody, TEO, Immunoglobulin leveles   - please send viral hepatitis panel: Hep A, Hep B, Hep C, Hep E  antibody and DNA PCR CMV, EBV, and HSV PCR and IgM   - please send Utox, tylenol level, ASA level, ethanol level   - please send daily LFT's and INR   - obtain abdominal US with doppler Impression  1) Elevated LFTs - in the setting of recent GI upset with a history of [place], possibly a result of that.  DDx includes infectious hepatitis, alcoholic ehpatitis as well.  Biliary obstruction possible but less likely but will need US and labs to evaluate.    Recommendations   - send GI-PCR if diarrhea continues   - please send auto immune work up: anti-mitochondrial, anti-smooth muscle, anti-LKM, anti-liver cytosol antibody, TEO, Immunoglobulin leveles   - please send viral hepatitis panel: Hep A, Hep B, Hep C, Hep E  antibody and DNA PCR CMV, EBV, and HSV PCR and IgM   - please send Utox, tylenol level, ASA level, ethanol level   - please send daily LFT's and INR   - obtain abdominal US with doppler Impression  1) Elevated LFTs - in the setting of recent GI upset with a history of [place], possibly a result of that.  DDx includes infectious hepatitis, alcoholic ehpatitis as well.  Biliary obstruction possible but less likely but will need US and labs to evaluate.    Recommendations   - repeat CBC and send blood cultures and urine culture   - send GI-PCR if diarrhea continues   - please send auto immune work up: anti-mitochondrial, anti-smooth muscle, anti-LKM, anti-liver cytosol antibody, TEO, Immunoglobulin leveles   - please send viral hepatitis panel: Hep A, Hep B, Hep C, Hep E  antibody and DNA PCR CMV, EBV, and HSV PCR and IgM   - please send Utox, tylenol level, ASA level, ethanol level   - please send daily LFT's and INR   - obtain abdominal US with doppler Impression  1) Elevated LFTs - in the setting of recent GI upset with a history of [place], possibly a result of that.  DDx includes infectious hepatitis, alcoholic ehpatitis as well.  Biliary obstruction possible but less likely but will need US and labs to evaluate.    Recommendations   - repeat CBC and send blood cultures and urine culture   - send GI-PCR if diarrhea continues   - please send auto immune work up: anti-mitochondrial, anti-smooth muscle, anti-LKM, anti-liver cytosol antibody, TEO, Immunoglobulin leveles   - please send viral hepatitis panel: Hep A, Hep B, Hep C, Hep E IgM antibody and DNA PCR CMV, EBV, and HSV PCR and IgM   - please send Utox, tylenol level, ASA level, ethanol level   - please send daily LFT's and INR   - obtain abdominal US with doppler

## 2020-03-13 NOTE — ED PROVIDER NOTE - PROGRESS NOTE DETAILS
Cornell PINEDA MD PGY2: Spoke to GI. Agree patient needs admission for MRCP. Spoke to Dr Damon Talamantes whom accepted the admission.

## 2020-03-13 NOTE — ED PROVIDER NOTE - OBJECTIVE STATEMENT
Cornell PINEDA MD PGY2: 69 M PMH HTN, alcohol abuse, and elevated LFTs in the past sent in by gastroenterologist for RHINA and elevated LFTs. Patient has been having intermittent right sided abdominal pain radiating to the back x 1 week associated with nausea, NBNB emesis and anorexia. No fever, chills, diarrhea. Went to see gastroenterologist and was found to have elevated LFTs and RHINA. Consequently sent here. Has not been drinking ever since his trip to [place] 1 week ago.

## 2020-03-13 NOTE — H&P ADULT - NSHPSOCIALHISTORY_GEN_ALL_CORE
Pt reports living with his wife at home, is a retired  at [employer]. Reports drinking 1-2 beers per night for several years but no excessive drinking habits, no smoking or recreational drug use.

## 2020-03-13 NOTE — CONSULT NOTE ADULT - SUBJECTIVE AND OBJECTIVE BOX
Chief Complaint:  Patient is a 69y old  Male who presents with a chief complaint of     HPI:  The patient is a 69 M w/ PMH of fatty liver, elevated AST and ALT, HTN, BPH, Gilbert's syndrome who presents due to being called by Dr. Vasquez with abnormal labs.  The patient states he was recently in [place] for two weeks.  Last thursday he visited nephew who was sick and then friday he started to not feel with with nausea and NBNB emesis and diarrhea w/ decreased PO intake.  Most of his symptoms ahve resolved but he still has mild lower abdominal pain.  hH went to liver clinic and was told to come to ED due to lab results.   Patient denies fevers or any others symptoms.    In the ED VSS    Allergies:  No Known Allergies      Home Medications:    Hospital Medications:      PMHX/PSHX:  No pertinent past medical history      Family history:      Social History:     ROS:     General:  No wt loss, fevers, chills, night sweats, fatigue,   Eyes:  Good vision, no reported pain  ENT:  No sore throat, pain, runny nose, dysphagia  CV:  No pain, palpitations, hypo/hypertension  Resp:  No dyspnea, cough, tachypnea, wheezing  GI:  See HPI  :  No pain, bleeding, incontinence, nocturia  Muscle:  No pain, weakness  Neuro:  No weakness, tingling, memory problems  Psych:  No fatigue, insomnia, mood problems, depression  Endocrine:  No polyuria, polydipsia, cold/heat intolerance  Heme:  No petechiae, ecchymosis, easy bruisability  Skin:  No rash, edema      PHYSICAL EXAM:     GENERAL:  NAD, icteric  CHEST:  Full & symmetric excursion  HEART:  Regular rhythm, no abdominal bruit, no edema  ABDOMEN:  Soft, non-tender, non-distended, normoactive bowel sounds,  no masses , no hepatosplenomegaly  EXTREMITIES:  no cyanosis,clubbing or edema  SKIN:  No rash/erythema/ecchymoses/petechiae/wounds/abscess/warm/dry  NEURO:  Alert, oriented    Vital Signs:  Vital Signs Last 24 Hrs  T(C): 36.6 (13 Mar 2020 12:30), Max: 36.6 (13 Mar 2020 12:30)  T(F): 97.9 (13 Mar 2020 12:30), Max: 97.9 (13 Mar 2020 12:30)  HR: 73 (13 Mar 2020 12:30) (71 - 73)  BP: 121/84 (13 Mar 2020 12:30) (121/84 - 148/71)  BP(mean): --  RR: 18 (13 Mar 2020 12:30) (18 - 18)  SpO2: 96% (13 Mar 2020 12:30) (96% - 98%)  Daily Height in cm: 185.42 (13 Mar 2020 11:01)    Daily     LABS:                        16.0   9.73  )-----------( 246      ( 13 Mar 2020 12:40 )             45.7     03-13    137  |  94<L>  |  34<H>  ----------------------------<  106<H>  3.0<L>   |  27  |  1.57<H>    Ca    9.4      13 Mar 2020 12:40    TPro  7.6  /  Alb  4.0  /  TBili  13.1<H>  /  DBili  9.7<H>  /  AST  357<H>  /  ALT  438<H>  /  AlkPhos  254<H>  03-13    LIVER FUNCTIONS - ( 13 Mar 2020 12:40 )  Alb: 4.0 g/dL / Pro: 7.6 g/dL / ALK PHOS: 254 U/L / ALT: 438 U/L / AST: 357 U/L / GGT: x                   Imaging: Chief Complaint:  Patient is a 69y old  Male who presents with a chief complaint of     HPI:  The patient is a 69 M w/ PMH of fatty liver, elevated AST and ALT, HTN, BPH, Gilbert's syndrome who presents due to being called by Dr. Vasquez with abnormal labs.  The patient states he was recently in [place] for two weeks.  Last thursday he visited nephew who was sick and then friday he started to not feel with with nausea and NBNB emesis and diarrhea w/ decreased PO intake.  Patietnt also notes that he has ahd darkening of urine.Most of his symptoms ahve resolved but he still has mild lower abdominal pain.  hH went to liver clinic and was told to come to ED due to lab results.   Patient denies fevers or any others symptoms.    In the ED VSS    Allergies:  No Known Allergies      Home Medications:    Hospital Medications:      PMHX/PSHX:  No pertinent past medical history      Family history:      Social History:     ROS:     General:  No wt loss, fevers, chills, night sweats, fatigue,   Eyes:  Good vision, no reported pain  ENT:  No sore throat, pain, runny nose, dysphagia  CV:  No pain, palpitations, hypo/hypertension  Resp:  No dyspnea, cough, tachypnea, wheezing  GI:  See HPI  :  No pain, bleeding, incontinence, nocturia  Muscle:  No pain, weakness  Neuro:  No weakness, tingling, memory problems  Psych:  No fatigue, insomnia, mood problems, depression  Endocrine:  No polyuria, polydipsia, cold/heat intolerance  Heme:  No petechiae, ecchymosis, easy bruisability  Skin:  No rash, edema      PHYSICAL EXAM:     GENERAL:  NAD, icteric  CHEST:  Full & symmetric excursion  HEART:  Regular rhythm, no abdominal bruit, no edema  ABDOMEN:  Soft, non-tender, non-distended, normoactive bowel sounds,  no masses , no hepatosplenomegaly  EXTREMITIES:  no cyanosis,clubbing or edema  SKIN:  No rash/erythema/ecchymoses/petechiae/wounds/abscess/warm/dry  NEURO:  Alert, oriented    Vital Signs:  Vital Signs Last 24 Hrs  T(C): 36.6 (13 Mar 2020 12:30), Max: 36.6 (13 Mar 2020 12:30)  T(F): 97.9 (13 Mar 2020 12:30), Max: 97.9 (13 Mar 2020 12:30)  HR: 73 (13 Mar 2020 12:30) (71 - 73)  BP: 121/84 (13 Mar 2020 12:30) (121/84 - 148/71)  BP(mean): --  RR: 18 (13 Mar 2020 12:30) (18 - 18)  SpO2: 96% (13 Mar 2020 12:30) (96% - 98%)  Daily Height in cm: 185.42 (13 Mar 2020 11:01)    Daily     LABS:                        16.0   9.73  )-----------( 246      ( 13 Mar 2020 12:40 )             45.7     03-13    137  |  94<L>  |  34<H>  ----------------------------<  106<H>  3.0<L>   |  27  |  1.57<H>    Ca    9.4      13 Mar 2020 12:40    TPro  7.6  /  Alb  4.0  /  TBili  13.1<H>  /  DBili  9.7<H>  /  AST  357<H>  /  ALT  438<H>  /  AlkPhos  254<H>  03-13    LIVER FUNCTIONS - ( 13 Mar 2020 12:40 )  Alb: 4.0 g/dL / Pro: 7.6 g/dL / ALK PHOS: 254 U/L / ALT: 438 U/L / AST: 357 U/L / GGT: x                   Imaging: Chief Complaint:  Patient is a 69y old  Male who presents with a chief complaint of     HPI:  The patient is a 69 M w/ PMH of fatty liver, elevated AST and ALT, HTN, BPH, Gilbert's syndrome who presents due to being called by Dr. Vasquez with abnormal labs.  The patient states he was recently in [place] for two weeks.  Last thursday he visited nephew who was sick and then friday he started to not feel with with nausea and NBNB emesis and diarrhea w/ decreased PO intake.  Patietnt also notes that he has ahd darkening of urine.  Most of his symptoms ahve resolved but he still has mild lower abdominal pain.  He went to liver clinic and was told to come to ED due to lab results.   Patient denies fevers or any others symptoms.    In the ED VSS    Allergies:  No Known Allergies      Home Medications:    Hospital Medications:      PMHX/PSHX:  No pertinent past medical history      Family history:      Social History:     ROS:     General:  No wt loss, fevers, chills, night sweats, fatigue,   Eyes:  Good vision, no reported pain  ENT:  No sore throat, pain, runny nose, dysphagia  CV:  No pain, palpitations, hypo/hypertension  Resp:  No dyspnea, cough, tachypnea, wheezing  GI:  See HPI  :  No pain, bleeding, incontinence, nocturia  Muscle:  No pain, weakness  Neuro:  No weakness, tingling, memory problems  Psych:  No fatigue, insomnia, mood problems, depression  Endocrine:  No polyuria, polydipsia, cold/heat intolerance  Heme:  No petechiae, ecchymosis, easy bruisability  Skin:  No rash, edema      PHYSICAL EXAM:     GENERAL:  NAD, icteric  CHEST:  Full & symmetric excursion  HEART:  Regular rhythm, no abdominal bruit, no edema  ABDOMEN:  Soft, non-tender, non-distended, normoactive bowel sounds,  no masses , no hepatosplenomegaly  EXTREMITIES:  no cyanosis,clubbing or edema  SKIN:  No rash/erythema/ecchymoses/petechiae/wounds/abscess/warm/dry  NEURO:  Alert, oriented    Vital Signs:  Vital Signs Last 24 Hrs  T(C): 36.6 (13 Mar 2020 12:30), Max: 36.6 (13 Mar 2020 12:30)  T(F): 97.9 (13 Mar 2020 12:30), Max: 97.9 (13 Mar 2020 12:30)  HR: 73 (13 Mar 2020 12:30) (71 - 73)  BP: 121/84 (13 Mar 2020 12:30) (121/84 - 148/71)  BP(mean): --  RR: 18 (13 Mar 2020 12:30) (18 - 18)  SpO2: 96% (13 Mar 2020 12:30) (96% - 98%)  Daily Height in cm: 185.42 (13 Mar 2020 11:01)    Daily     LABS:                        16.0   9.73  )-----------( 246      ( 13 Mar 2020 12:40 )             45.7     03-13    137  |  94<L>  |  34<H>  ----------------------------<  106<H>  3.0<L>   |  27  |  1.57<H>    Ca    9.4      13 Mar 2020 12:40    TPro  7.6  /  Alb  4.0  /  TBili  13.1<H>  /  DBili  9.7<H>  /  AST  357<H>  /  ALT  438<H>  /  AlkPhos  254<H>  03-13    LIVER FUNCTIONS - ( 13 Mar 2020 12:40 )  Alb: 4.0 g/dL / Pro: 7.6 g/dL / ALK PHOS: 254 U/L / ALT: 438 U/L / AST: 357 U/L / GGT: x                   Imaging: Chief Complaint:  Patient is a 69y old  Male who presents with a chief complaint of worsening liver tests and jaundice.     HPI:  The patient is a 69 M w/ PMH of fatty liver, elevated AST and ALT, HTN, BPH, Gilbert's syndrome who presents due to being called by Dr. Vasquez with abnormal labs.  The patient states he was recently in [place] for two weeks.  Last thursday he visited nephew who was sick and then friday he started to not feel with with nausea and NBNB emesis and diarrhea w/ decreased PO intake.  Patietnt also notes that he has ahd darkening of urine.  Most of his symptoms ahve resolved but he still has mild lower abdominal pain.  He went to liver clinic and was told to come to ED due to lab results.   Patient denies fevers or any others symptoms.    In the ED VSS    Allergies:  No Known Allergies      Home Medications:    Hospital Medications:      PMHX/PSHX:  No pertinent past medical history      Family history:      Social History:     ROS:     General:  No wt loss, fevers, chills, night sweats, fatigue,   Eyes:  Good vision, no reported pain  ENT:  No sore throat, pain, runny nose, dysphagia  CV:  No pain, palpitations, hypo/hypertension  Resp:  No dyspnea, cough, tachypnea, wheezing  GI:  See HPI  :  No pain, bleeding, incontinence, nocturia  Muscle:  No pain, weakness  Neuro:  No weakness, tingling, memory problems  Psych:  No fatigue, insomnia, mood problems, depression  Endocrine:  No polyuria, polydipsia, cold/heat intolerance  Heme:  No petechiae, ecchymosis, easy bruisability  Skin:  No rash, edema      PHYSICAL EXAM:     GENERAL:  NAD, icteric  CHEST:  Full & symmetric excursion  HEART:  Regular rhythm, no abdominal bruit, no edema  ABDOMEN:  Soft, non-tender, non-distended, normoactive bowel sounds,  no masses , no hepatosplenomegaly  EXTREMITIES:  no cyanosis,clubbing or edema  SKIN:  No rash/erythema/ecchymoses/petechiae/wounds/abscess/warm/dry  NEURO:  Alert, oriented    Vital Signs:  Vital Signs Last 24 Hrs  T(C): 36.6 (13 Mar 2020 12:30), Max: 36.6 (13 Mar 2020 12:30)  T(F): 97.9 (13 Mar 2020 12:30), Max: 97.9 (13 Mar 2020 12:30)  HR: 73 (13 Mar 2020 12:30) (71 - 73)  BP: 121/84 (13 Mar 2020 12:30) (121/84 - 148/71)  BP(mean): --  RR: 18 (13 Mar 2020 12:30) (18 - 18)  SpO2: 96% (13 Mar 2020 12:30) (96% - 98%)  Daily Height in cm: 185.42 (13 Mar 2020 11:01)    Daily     LABS:                        16.0   9.73  )-----------( 246      ( 13 Mar 2020 12:40 )             45.7     03-13    137  |  94<L>  |  34<H>  ----------------------------<  106<H>  3.0<L>   |  27  |  1.57<H>    Ca    9.4      13 Mar 2020 12:40    TPro  7.6  /  Alb  4.0  /  TBili  13.1<H>  /  DBili  9.7<H>  /  AST  357<H>  /  ALT  438<H>  /  AlkPhos  254<H>  03-13    LIVER FUNCTIONS - ( 13 Mar 2020 12:40 )  Alb: 4.0 g/dL / Pro: 7.6 g/dL / ALK PHOS: 254 U/L / ALT: 438 U/L / AST: 357 U/L / GGT: x                   Imaging:

## 2020-03-14 LAB
ALBUMIN SERPL ELPH-MCNC: 3.3 G/DL — SIGNIFICANT CHANGE UP (ref 3.3–5)
ALP SERPL-CCNC: 247 U/L — HIGH (ref 40–120)
ALT FLD-CCNC: 402 U/L — HIGH (ref 10–45)
ANION GAP SERPL CALC-SCNC: 14 MMOL/L — SIGNIFICANT CHANGE UP (ref 5–17)
APTT BLD: 25.3 SEC — LOW (ref 27.5–36.3)
AST SERPL-CCNC: 321 U/L — HIGH (ref 10–40)
BASOPHILS # BLD AUTO: 0.04 K/UL — SIGNIFICANT CHANGE UP (ref 0–0.2)
BASOPHILS NFR BLD AUTO: 0.5 % — SIGNIFICANT CHANGE UP (ref 0–2)
BILIRUB SERPL-MCNC: 8 MG/DL — HIGH (ref 0.2–1.2)
BUN SERPL-MCNC: 26 MG/DL — HIGH (ref 7–23)
CALCIUM SERPL-MCNC: 8.8 MG/DL — SIGNIFICANT CHANGE UP (ref 8.4–10.5)
CHLORIDE SERPL-SCNC: 98 MMOL/L — SIGNIFICANT CHANGE UP (ref 96–108)
CO2 SERPL-SCNC: 25 MMOL/L — SIGNIFICANT CHANGE UP (ref 22–31)
CREAT ?TM UR-MCNC: 124 MG/DL — SIGNIFICANT CHANGE UP
CREAT SERPL-MCNC: 1.17 MG/DL — SIGNIFICANT CHANGE UP (ref 0.5–1.3)
CULTURE RESULTS: NO GROWTH — SIGNIFICANT CHANGE UP
EBV EA AB SER IA-ACNC: >150 U/ML — HIGH
EBV EA AB TITR SER IF: POSITIVE
EBV EA IGG SER-ACNC: POSITIVE
EBV NA IGG SER IA-ACNC: 477 U/ML — HIGH
EBV PATRN SPEC IB-IMP: SIGNIFICANT CHANGE UP
EBV VCA IGG AVIDITY SER QL IA: POSITIVE
EBV VCA IGM SER IA-ACNC: <10 U/ML — SIGNIFICANT CHANGE UP
EBV VCA IGM SER IA-ACNC: >750 U/ML — HIGH
EBV VCA IGM TITR FLD: NEGATIVE — SIGNIFICANT CHANGE UP
EOSINOPHIL # BLD AUTO: 0.23 K/UL — SIGNIFICANT CHANGE UP (ref 0–0.5)
EOSINOPHIL NFR BLD AUTO: 2.9 % — SIGNIFICANT CHANGE UP (ref 0–6)
GLUCOSE SERPL-MCNC: 102 MG/DL — HIGH (ref 70–99)
HAV IGG SER QL IA: REACTIVE
HAV IGM SER-ACNC: SIGNIFICANT CHANGE UP
HBV CORE IGM SER-ACNC: SIGNIFICANT CHANGE UP
HBV SURFACE AB SER-ACNC: ABNORMAL
HBV SURFACE AG SER-ACNC: SIGNIFICANT CHANGE UP
HCT VFR BLD CALC: 40.8 % — SIGNIFICANT CHANGE UP (ref 39–50)
HCV AB S/CO SERPL IA: 0.2 S/CO — SIGNIFICANT CHANGE UP (ref 0–0.99)
HCV AB SERPL-IMP: SIGNIFICANT CHANGE UP
HGB BLD-MCNC: 14 G/DL — SIGNIFICANT CHANGE UP (ref 13–17)
HSV1 IGG SER-ACNC: 0.1 INDEX — SIGNIFICANT CHANGE UP
HSV1 IGG SERPL QL IA: NEGATIVE — SIGNIFICANT CHANGE UP
IMM GRANULOCYTES NFR BLD AUTO: 0.9 % — SIGNIFICANT CHANGE UP (ref 0–1.5)
LYMPHOCYTES # BLD AUTO: 0.91 K/UL — LOW (ref 1–3.3)
LYMPHOCYTES # BLD AUTO: 11.3 % — LOW (ref 13–44)
MAGNESIUM SERPL-MCNC: 2.2 MG/DL — SIGNIFICANT CHANGE UP (ref 1.6–2.6)
MCHC RBC-ENTMCNC: 31.3 PG — SIGNIFICANT CHANGE UP (ref 27–34)
MCHC RBC-ENTMCNC: 34.3 GM/DL — SIGNIFICANT CHANGE UP (ref 32–36)
MCV RBC AUTO: 91.1 FL — SIGNIFICANT CHANGE UP (ref 80–100)
MONOCYTES # BLD AUTO: 1.11 K/UL — HIGH (ref 0–0.9)
MONOCYTES NFR BLD AUTO: 13.8 % — SIGNIFICANT CHANGE UP (ref 2–14)
NEUTROPHILS # BLD AUTO: 5.66 K/UL — SIGNIFICANT CHANGE UP (ref 1.8–7.4)
NEUTROPHILS NFR BLD AUTO: 70.6 % — SIGNIFICANT CHANGE UP (ref 43–77)
NRBC # BLD: 0 /100 WBCS — SIGNIFICANT CHANGE UP (ref 0–0)
OSMOLALITY SERPL: 296 MOSMOL/KG — SIGNIFICANT CHANGE UP (ref 280–301)
PHOSPHATE SERPL-MCNC: 3.2 MG/DL — SIGNIFICANT CHANGE UP (ref 2.5–4.5)
PLATELET # BLD AUTO: 219 K/UL — SIGNIFICANT CHANGE UP (ref 150–400)
POTASSIUM SERPL-MCNC: 3.1 MMOL/L — LOW (ref 3.5–5.3)
POTASSIUM SERPL-SCNC: 3.1 MMOL/L — LOW (ref 3.5–5.3)
PROT SERPL-MCNC: 6.6 G/DL — SIGNIFICANT CHANGE UP (ref 6–8.3)
RBC # BLD: 4.48 M/UL — SIGNIFICANT CHANGE UP (ref 4.2–5.8)
RBC # FLD: 13.6 % — SIGNIFICANT CHANGE UP (ref 10.3–14.5)
SODIUM SERPL-SCNC: 137 MMOL/L — SIGNIFICANT CHANGE UP (ref 135–145)
SODIUM UR-SCNC: 83 MMOL/L — SIGNIFICANT CHANGE UP
SPECIMEN SOURCE: SIGNIFICANT CHANGE UP
UUN UR-MCNC: 934 MG/DL — SIGNIFICANT CHANGE UP
WBC # BLD: 8.02 K/UL — SIGNIFICANT CHANGE UP (ref 3.8–10.5)
WBC # FLD AUTO: 8.02 K/UL — SIGNIFICANT CHANGE UP (ref 3.8–10.5)

## 2020-03-14 PROCEDURE — 99233 SBSQ HOSP IP/OBS HIGH 50: CPT | Mod: GC

## 2020-03-14 PROCEDURE — 99223 1ST HOSP IP/OBS HIGH 75: CPT | Mod: GC

## 2020-03-14 RX ORDER — SIMETHICONE 80 MG/1
80 TABLET, CHEWABLE ORAL ONCE
Refills: 0 | Status: COMPLETED | OUTPATIENT
Start: 2020-03-14 | End: 2020-03-14

## 2020-03-14 RX ORDER — POTASSIUM CHLORIDE 20 MEQ
40 PACKET (EA) ORAL EVERY 4 HOURS
Refills: 0 | Status: COMPLETED | OUTPATIENT
Start: 2020-03-14 | End: 2020-03-14

## 2020-03-14 RX ADMIN — HEPARIN SODIUM 5000 UNIT(S): 5000 INJECTION INTRAVENOUS; SUBCUTANEOUS at 21:36

## 2020-03-14 RX ADMIN — Medication 40 MILLIEQUIVALENT(S): at 11:16

## 2020-03-14 RX ADMIN — PIPERACILLIN AND TAZOBACTAM 25 GRAM(S): 4; .5 INJECTION, POWDER, LYOPHILIZED, FOR SOLUTION INTRAVENOUS at 14:55

## 2020-03-14 RX ADMIN — Medication 40 MILLIEQUIVALENT(S): at 14:56

## 2020-03-14 RX ADMIN — PIPERACILLIN AND TAZOBACTAM 25 GRAM(S): 4; .5 INJECTION, POWDER, LYOPHILIZED, FOR SOLUTION INTRAVENOUS at 22:16

## 2020-03-14 RX ADMIN — Medication 40 MILLIEQUIVALENT(S): at 05:36

## 2020-03-14 RX ADMIN — HEPARIN SODIUM 5000 UNIT(S): 5000 INJECTION INTRAVENOUS; SUBCUTANEOUS at 14:55

## 2020-03-14 RX ADMIN — SIMETHICONE 80 MILLIGRAM(S): 80 TABLET, CHEWABLE ORAL at 10:36

## 2020-03-14 RX ADMIN — HEPARIN SODIUM 5000 UNIT(S): 5000 INJECTION INTRAVENOUS; SUBCUTANEOUS at 05:36

## 2020-03-14 RX ADMIN — Medication 25 MILLIGRAM(S): at 05:35

## 2020-03-14 RX ADMIN — PIPERACILLIN AND TAZOBACTAM 25 GRAM(S): 4; .5 INJECTION, POWDER, LYOPHILIZED, FOR SOLUTION INTRAVENOUS at 05:36

## 2020-03-14 NOTE — PROGRESS NOTE ADULT - PROBLEM SELECTOR PLAN 6
GOC: Full code  DVT prophylaxis: hep sub q, reportedly taking  for DVT 10-15 years ago, but will hold for now in setting of RHINA  Diet: DASH

## 2020-03-14 NOTE — PROGRESS NOTE ADULT - PROBLEM SELECTOR PLAN 3
Found to have gallbladder wall thickening on Abd US and distended gallbladder  - f/u MRCP  - c/w empiric treatment w/ zosyn  - continue to monitor Found to have gallbladder wall thickening on Abd US and distended gallbladder, but presentation not consistent  - c/w empiric treatment w/ zosyn  - continue to monitor

## 2020-03-14 NOTE — PROGRESS NOTE ADULT - PROBLEM SELECTOR PLAN 1
Baseline AST/ALT wnl, bili only mildly elevated at baseline from outpatient records, but now found to be elevated, possibly in setting of ?viral hepatitis vs ?cholecystitis, given imaging findings vs ?malignancy  - Hepatology c/s appreciated, will f/u labs as following: anti-mitochondrial, anti-smooth muscle, anti-LKM, anti-liver cytosol antibody, TEO, Immunoglobulin levels, Hep A, Hep B, Hep C, Hep E  antibody and DNA PCR CMV, EBV, and HSV PCR and IgM, Utox, tylenol level, ASA level, ethanol level  - Abd US showing gallbladder wall thickening, no evidence of stones,   - f/u MRCP  - eval peripancreatic lymphadenopathy on MRCP  - CMP/INR daily  - avoid liver toxic medications Baseline AST/ALT wnl, Baseline Tbili range 1.2-2.6 from outpatient records, but now found to be elevated. DDx includes obstructive hepatopathy vs less likely cholecystitis (Negative Jaquez, afebrile), viral hepatitis panel negative. Cannot exclude autoimmune cause as panel pending  - Tox panel negative  - Hepatology c/s appreciated, will f/u labs as following: anti-mitochondrial, anti-smooth muscle, anti-LKM, anti-liver cytosol antibody, TEO, Immunoglobulin levels, Hep E  antibody and DNA PCR CMV  - Abd US showing gallbladder wall thickening, no evidence of stones,   - MRCP confirming US: gallbladder wall thickening, no stones, no sludge, no CBD dilatation, pancreatic cysts suspicious for IPMN  - Will d/w hepatology regarding ERCP/EUS given suspicion for obstructive process to r/o missed stone vs other etiology  - CMP/INR daily  - avoid liver toxic medications

## 2020-03-14 NOTE — PROGRESS NOTE ADULT - ASSESSMENT
Pt is a 69 M w/ PMH of fatty liver, elevated AST and ALT, HTN, BPH, Gilbert's syndrome presenting from the clinic for abnormal lab findings. Found to have elevated AST/ALT, hyperbilirbinemia, Crt 1.5, w/ distended gallbladder w/ wall thickening, cystic lesion in pancreas w/ peripancreatic lymphadenopathy admitted to medicine for further evaluation of acute liver failure Pt is a 69 M w/ PMH of fatty liver, HTN, BPH, Gilbert's syndrome presenting sent from clinic for transaminitis and RHINA, imaging w/gallbladder distension and wall thickening w/o stones. Etiology likely obstructive hepatopathy, stable, awaiting further workup.

## 2020-03-14 NOTE — PROGRESS NOTE ADULT - PROBLEM SELECTOR PLAN 2
Possibly in setting of Acute liver failure, likely prerenal in nature, currently improved from outpatient Crt of 1.92  - F/u urine lytes  - s/p 1L fluids, improved  - daily BMP  - c/w IVF  - hold losartan and hctz Possibly in setting of Acute liver failure, likely prerenal in nature, now resolved  - Continue to hold home losartan and hctz

## 2020-03-14 NOTE — PROGRESS NOTE ADULT - SUBJECTIVE AND OBJECTIVE BOX
Author:   Tunde Tomas MD  Internal Medicine, PGY1  [phone]    Patient:  YESICA ROCHA  [id number]    Progress Note    Interval events: No acute events.  Pertinent ROS (if any):      Administered:  potassium chloride    Tablet ER: 40 milliEquivalent(s) Oral (03-14 @ 05:36)  piperacillin/tazobactam IVPB..: 25 mL/Hr IV Intermittent (03-14 @ 05:36)  heparin  Injectable: 5000 Unit(s) SubCutaneous (03-14 @ 05:36)  metoprolol succinate ER: 25 milliGRAM(s) Oral (03-14 @ 05:35)  heparin  Injectable: 5000 Unit(s) SubCutaneous (03-13 @ 21:33)  piperacillin/tazobactam IVPB..: 25 mL/Hr IV Intermittent (03-13 @ 21:32)        OBJECTIVE:    03-13 @ 07:01  -  03-14 @ 07:00  --------------------------------------------------------  IN: 300 mL / OUT: 0 mL / NET: 300 mL      CAPILLARY BLOOD GLUCOSE            VITALS:  T(F): 99.2 (03-14-20 @ 04:40), Max: 99.2 (03-14-20 @ 04:40)  HR: 69 (03-14-20 @ 04:40) (65 - 78)  BP: 159/69 (03-14-20 @ 04:40) (121/84 - 159/69)  RR: 18 (03-14-20 @ 04:40) (16 - 18)  SpO2: 97% (03-14-20 @ 04:40) (96% - 100%)    PHYSICAL EXAM:  GENERAL: NAD, lying in bed comfortably  HEAD:  Atraumatic, Normocephalic  EYES: EOMI, PERRLA, conjunctiva and sclera clear  ENT: Moist mucous membranes  NECK: Supple, No JVD  CHEST/LUNG: Clear to auscultation bilaterally; No rales, rhonchi, wheezing, or rubs. Unlabored respirations  HEART: Regular rate and rhythm; No murmurs, rubs, or gallops  ABDOMEN: Bowel sounds present; Soft, Nontender, Nondistended. No hepatomegaly  EXTREMITIES:  2+ Peripheral Pulses, brisk capillary refill. No clubbing, cyanosis, or edema  NERVOUS SYSTEM:  Alert & Oriented X3, speech clear. No deficits   MSK: FROM all 4 extremities, full and equal strength  SKIN: No rashes or lesions    HOSPITAL MEDICATIONS:  Standing Meds:  heparin  Injectable 5000 Unit(s) SubCutaneous every 8 hours  lactated ringers. 1000 milliLiter(s) IV Continuous <Continuous>  metoprolol succinate ER 25 milliGRAM(s) Oral daily  piperacillin/tazobactam IVPB.. 3.375 Gram(s) IV Intermittent every 8 hours      PRN Meds:      LABS:  CBC 03-13-20 @ 16:41                        15.7   8.01  )-----------( 232                   44.3       Hgb trend: 15.7 <-- , 16.0 <--   WBC trend: 8.01 <-- , 9.73 <--       CMP 03-13-20 @ 16:41    138  |  97  |  33<H>  ----------------------------<  92  3.3<L>   |  25  |  1.32<H>    Ca    9.1      03-13-20 @ 16:41  Mg     2.6     03-13    TPro  7.4  /  Alb  3.6  /  TBili  12.3<H>  /  DBili  9.1<H>  /  AST  347<H>  /  ALT  444<H>  /  AlkPhos  257<H>  03-13      Serum Cr trend: 1.32 <-- , 1.57 <--     PT/INR - ( 13 Mar 2020 16:41 )   PT: 12.0 sec;   INR: 1.05 ratio         PTT - ( 13 Mar 2020 16:41 )  PTT:25.1 sec    ABG Trend:         MICROBIOLOGY:       RADIOLOGY:  [ ] Reviewed and interpreted by me    EKG: Author:   Tunde Tomas MD  Internal Medicine, PGY1  [phone]    Patient:  YEISCA ROCHA  [id number]    Progress Note    Interval events: No acute events. Patient denies abdominal pain, nausea, vomiting. He has been tolerating his diet. Answered questions about labs and next steps.   Pertinent ROS (if any):      Administered:  potassium chloride    Tablet ER: 40 milliEquivalent(s) Oral (03-14 @ 05:36)  piperacillin/tazobactam IVPB..: 25 mL/Hr IV Intermittent (03-14 @ 05:36)  heparin  Injectable: 5000 Unit(s) SubCutaneous (03-14 @ 05:36)  metoprolol succinate ER: 25 milliGRAM(s) Oral (03-14 @ 05:35)  heparin  Injectable: 5000 Unit(s) SubCutaneous (03-13 @ 21:33)  piperacillin/tazobactam IVPB..: 25 mL/Hr IV Intermittent (03-13 @ 21:32)        OBJECTIVE:    03-13 @ 07:01  -  03-14 @ 07:00  --------------------------------------------------------  IN: 300 mL / OUT: 0 mL / NET: 300 mL      CAPILLARY BLOOD GLUCOSE            VITALS:  T(F): 99.2 (03-14-20 @ 04:40), Max: 99.2 (03-14-20 @ 04:40)  HR: 69 (03-14-20 @ 04:40) (65 - 78)  BP: 159/69 (03-14-20 @ 04:40) (121/84 - 159/69)  RR: 18 (03-14-20 @ 04:40) (16 - 18)  SpO2: 97% (03-14-20 @ 04:40) (96% - 100%)    PHYSICAL EXAM:  GENERAL: NAD, lying in bed comfortably  EYES: EOMI, PERRLA, conjunctiva and sclera clear  ENT: Moist mucous membranes  CHEST/LUNG: Clear to auscultation bilaterally; No rales, rhonchi, wheezing, or rubs. Unlabored respirations  HEART: Regular rate and rhythm; No murmurs, rubs, or gallops  ABDOMEN: Bowel sounds present; Soft, Nontender, Nondistended. No hepatomegaly, negative wagner sign  EXTREMITIES:  2+ Peripheral Pulses, brisk capillary refill. No clubbing, cyanosis, or edema  NERVOUS SYSTEM:  Alert & Oriented X4, speech clear. No deficits     HOSPITAL MEDICATIONS:  Standing Meds:  heparin  Injectable 5000 Unit(s) SubCutaneous every 8 hours  lactated ringers. 1000 milliLiter(s) IV Continuous <Continuous>  metoprolol succinate ER 25 milliGRAM(s) Oral daily  piperacillin/tazobactam IVPB.. 3.375 Gram(s) IV Intermittent every 8 hours      PRN Meds:      LABS:  CBC 03-14-20 @ 07:47                        14.0   8.02  )-----------( 219                   40.8       Hgb trend: 14.0 <-- , 15.7 <-- , 16.0 <--   WBC trend: 8.02 <-- , 8.01 <-- , 9.73 <--       CMP 03-14-20 @ 07:47    137  |  98  |  26<H>  ----------------------------<  102<H>  3.1<L>   |  25  |  1.17    Ca    8.8      03-14-20 @ 07:47  Phos  3.2     03-14  Mg     2.2     03-14    TPro  6.6  /  Alb  3.3  /  TBili  8.0<H>  /  DBili  x   /  AST  321<H>  /  ALT  402<H>  /  AlkPhos  247<H>  03-14      Serum Cr trend: 1.17 <-- , 1.32 <-- , 1.57 <--     PT/INR - ( 13 Mar 2020 16:41 )   PT: 12.0 sec;   INR: 1.05 ratio           PTT - ( 14 Mar 2020 07:47 ):25.3 sec    RADIOLOGY:  [ ] Reviewed and interpreted by me    EKG:

## 2020-03-15 ENCOUNTER — TRANSCRIPTION ENCOUNTER (OUTPATIENT)
Age: 70
End: 2020-03-15

## 2020-03-15 VITALS
TEMPERATURE: 98 F | DIASTOLIC BLOOD PRESSURE: 73 MMHG | SYSTOLIC BLOOD PRESSURE: 116 MMHG | OXYGEN SATURATION: 98 % | RESPIRATION RATE: 18 BRPM | HEART RATE: 64 BPM

## 2020-03-15 LAB
ALBUMIN SERPL ELPH-MCNC: 3.2 G/DL — LOW (ref 3.3–5)
ALP SERPL-CCNC: 250 U/L — HIGH (ref 40–120)
ALT FLD-CCNC: 395 U/L — HIGH (ref 10–45)
ANA TITR SER: NEGATIVE — SIGNIFICANT CHANGE UP
ANION GAP SERPL CALC-SCNC: 12 MMOL/L — SIGNIFICANT CHANGE UP (ref 5–17)
AST SERPL-CCNC: 229 U/L — HIGH (ref 10–40)
BILIRUB SERPL-MCNC: 5.3 MG/DL — HIGH (ref 0.2–1.2)
BUN SERPL-MCNC: 21 MG/DL — SIGNIFICANT CHANGE UP (ref 7–23)
CALCIUM SERPL-MCNC: 8.6 MG/DL — SIGNIFICANT CHANGE UP (ref 8.4–10.5)
CHLORIDE SERPL-SCNC: 100 MMOL/L — SIGNIFICANT CHANGE UP (ref 96–108)
CO2 SERPL-SCNC: 26 MMOL/L — SIGNIFICANT CHANGE UP (ref 22–31)
CREAT SERPL-MCNC: 1.08 MG/DL — SIGNIFICANT CHANGE UP (ref 0.5–1.3)
GLUCOSE SERPL-MCNC: 110 MG/DL — HIGH (ref 70–99)
MAGNESIUM SERPL-MCNC: 2.1 MG/DL — SIGNIFICANT CHANGE UP (ref 1.6–2.6)
MITOCHONDRIA AB SER-ACNC: SIGNIFICANT CHANGE UP
PHOSPHATE SERPL-MCNC: 3.1 MG/DL — SIGNIFICANT CHANGE UP (ref 2.5–4.5)
POTASSIUM SERPL-MCNC: 3.4 MMOL/L — LOW (ref 3.5–5.3)
POTASSIUM SERPL-SCNC: 3.4 MMOL/L — LOW (ref 3.5–5.3)
PROT SERPL-MCNC: 6.4 G/DL — SIGNIFICANT CHANGE UP (ref 6–8.3)
SMOOTH MUSCLE AB SER-ACNC: SIGNIFICANT CHANGE UP
SODIUM SERPL-SCNC: 138 MMOL/L — SIGNIFICANT CHANGE UP (ref 135–145)
UUN UR-MCNC: 1139 MG/DL — SIGNIFICANT CHANGE UP

## 2020-03-15 PROCEDURE — 86695 HERPES SIMPLEX TYPE 1 TEST: CPT

## 2020-03-15 PROCEDURE — 87340 HEPATITIS B SURFACE AG IA: CPT

## 2020-03-15 PROCEDURE — 87040 BLOOD CULTURE FOR BACTERIA: CPT

## 2020-03-15 PROCEDURE — 85027 COMPLETE CBC AUTOMATED: CPT

## 2020-03-15 PROCEDURE — 86803 HEPATITIS C AB TEST: CPT

## 2020-03-15 PROCEDURE — 86664 EPSTEIN-BARR NUCLEAR ANTIGEN: CPT

## 2020-03-15 PROCEDURE — 99285 EMERGENCY DEPT VISIT HI MDM: CPT

## 2020-03-15 PROCEDURE — 86705 HEP B CORE ANTIBODY IGM: CPT

## 2020-03-15 PROCEDURE — 85610 PROTHROMBIN TIME: CPT

## 2020-03-15 PROCEDURE — 86708 HEPATITIS A ANTIBODY: CPT

## 2020-03-15 PROCEDURE — 86709 HEPATITIS A IGM ANTIBODY: CPT

## 2020-03-15 PROCEDURE — 86376 MICROSOMAL ANTIBODY EACH: CPT

## 2020-03-15 PROCEDURE — 82248 BILIRUBIN DIRECT: CPT

## 2020-03-15 PROCEDURE — 80307 DRUG TEST PRSMV CHEM ANLYZR: CPT

## 2020-03-15 PROCEDURE — 83690 ASSAY OF LIPASE: CPT

## 2020-03-15 PROCEDURE — 85730 THROMBOPLASTIN TIME PARTIAL: CPT

## 2020-03-15 PROCEDURE — 99239 HOSP IP/OBS DSCHRG MGMT >30: CPT

## 2020-03-15 PROCEDURE — 83935 ASSAY OF URINE OSMOLALITY: CPT

## 2020-03-15 PROCEDURE — 83930 ASSAY OF BLOOD OSMOLALITY: CPT

## 2020-03-15 PROCEDURE — 80053 COMPREHEN METABOLIC PANEL: CPT

## 2020-03-15 PROCEDURE — 84100 ASSAY OF PHOSPHORUS: CPT

## 2020-03-15 PROCEDURE — 84591 ASSAY OF NOS VITAMIN: CPT

## 2020-03-15 PROCEDURE — 74181 MRI ABDOMEN W/O CONTRAST: CPT

## 2020-03-15 PROCEDURE — 86663 EPSTEIN-BARR ANTIBODY: CPT

## 2020-03-15 PROCEDURE — 86038 ANTINUCLEAR ANTIBODIES: CPT

## 2020-03-15 PROCEDURE — 84540 ASSAY OF URINE/UREA-N: CPT

## 2020-03-15 PROCEDURE — 82784 ASSAY IGA/IGD/IGG/IGM EACH: CPT

## 2020-03-15 PROCEDURE — 86706 HEP B SURFACE ANTIBODY: CPT

## 2020-03-15 PROCEDURE — 76705 ECHO EXAM OF ABDOMEN: CPT

## 2020-03-15 PROCEDURE — 83735 ASSAY OF MAGNESIUM: CPT

## 2020-03-15 PROCEDURE — 86790 VIRUS ANTIBODY NOS: CPT

## 2020-03-15 PROCEDURE — 86665 EPSTEIN-BARR CAPSID VCA: CPT

## 2020-03-15 PROCEDURE — 82570 ASSAY OF URINE CREATININE: CPT

## 2020-03-15 PROCEDURE — 86381 MITOCHONDRIAL ANTIBODY EACH: CPT

## 2020-03-15 PROCEDURE — 86255 FLUORESCENT ANTIBODY SCREEN: CPT

## 2020-03-15 PROCEDURE — 87086 URINE CULTURE/COLONY COUNT: CPT

## 2020-03-15 PROCEDURE — 84300 ASSAY OF URINE SODIUM: CPT

## 2020-03-15 RX ORDER — FLUTICASONE PROPIONATE 50 MCG
1 SPRAY, SUSPENSION NASAL
Refills: 0 | Status: DISCONTINUED | OUTPATIENT
Start: 2020-03-15 | End: 2020-03-15

## 2020-03-15 RX ORDER — POTASSIUM CHLORIDE 20 MEQ
40 PACKET (EA) ORAL ONCE
Refills: 0 | Status: COMPLETED | OUTPATIENT
Start: 2020-03-15 | End: 2020-03-15

## 2020-03-15 RX ADMIN — HEPARIN SODIUM 5000 UNIT(S): 5000 INJECTION INTRAVENOUS; SUBCUTANEOUS at 05:30

## 2020-03-15 RX ADMIN — PIPERACILLIN AND TAZOBACTAM 25 GRAM(S): 4; .5 INJECTION, POWDER, LYOPHILIZED, FOR SOLUTION INTRAVENOUS at 13:11

## 2020-03-15 RX ADMIN — PIPERACILLIN AND TAZOBACTAM 25 GRAM(S): 4; .5 INJECTION, POWDER, LYOPHILIZED, FOR SOLUTION INTRAVENOUS at 05:30

## 2020-03-15 RX ADMIN — Medication 25 MILLIGRAM(S): at 05:30

## 2020-03-15 RX ADMIN — Medication 40 MILLIEQUIVALENT(S): at 14:20

## 2020-03-15 RX ADMIN — HEPARIN SODIUM 5000 UNIT(S): 5000 INJECTION INTRAVENOUS; SUBCUTANEOUS at 13:10

## 2020-03-15 NOTE — DISCHARGE NOTE NURSING/CASE MANAGEMENT/SOCIAL WORK - PATIENT PORTAL LINK FT
You can access the FollowMyHealth Patient Portal offered by [facility] by registering at the following website: [email or web address]. By joining [facility]’s FollowMyHealth portal, you will also be able to view your health information using other applications (apps) compatible with our system.

## 2020-03-15 NOTE — PROGRESS NOTE ADULT - ATTENDING COMMENTS
pt seen and examined.  above plan discussed on rounds today.  In addition,    pt with Acute liver injury  - bili continuing to down trending  - resolving obstructive hepatopathy   - f/u hepatology outpt  - stable for discharge  Discharge time spent: 31 min
pt seen and examined.  above plan discussed on rounds today.  In addition,    pt with Acute liver injury  - bili is down trending  - ?resolving obstructive hepatopathy which may now be resolving  - f/u hepatology

## 2020-03-15 NOTE — DISCHARGE NOTE PROVIDER - NSDCMRMEDTOKEN_GEN_ALL_CORE_FT
Cialis 5 mg oral tablet: 1 tab(s) orally once a day  hydroCHLOROthiazide 25 mg oral tablet: 1 tab(s) orally once a day  losartan 100 mg oral tablet: 1 tab(s) orally once a day  metoprolol succinate 25 mg oral tablet, extended release: 1 tab(s) orally once a day

## 2020-03-15 NOTE — DISCHARGE NOTE PROVIDER - NSDCCPCAREPLAN_GEN_ALL_CORE_FT
PRINCIPAL DISCHARGE DIAGNOSIS  Diagnosis: Transaminitis  Assessment and Plan of Treatment: You came to the hospital because of an elevation in your liver numbers and your kidney numbers. We performed several imaging studies that showed some thickening of the gallbladder wall, but without a clear source like stones or sludge. It is possible that you had a stone which passed before the study was done. Your numbers came down spontaneously and your symptoms improved. We consulted the [place] liver team and they felt comfortable discharging you. You should follow-up with your primary care doctor and with Dr. Domingo.      SECONDARY DISCHARGE DIAGNOSES  Diagnosis: Acute kidney injury  Assessment and Plan of Treatment: You had an increase in your kidney numbers when you came to the hospital. This is likely because you were dehydrated. We gave you IV fluids and the numbers improved. PRINCIPAL DISCHARGE DIAGNOSIS  Diagnosis: Transaminitis  Assessment and Plan of Treatment: You came to the hospital because of an elevation in your liver numbers and your kidney numbers. We performed several imaging studies that showed some thickening of the gallbladder wall, but without a clear source like stones or sludge. Your numbers came down spontaneously and your symptoms improved. It is possible that you had a stone which passed before the study was done. It is also possible that your alcohol intake caused some liver damage. Please abstain from drinking any alcohol until you see your outpatient Hepatologist. We consulted the [facility] liver team and they felt comfortable discharging you. You should follow-up with your primary care doctor and with Dr. Domingo within 1-2 weeks of your hospital discharge.      SECONDARY DISCHARGE DIAGNOSES  Diagnosis: Acute kidney injury  Assessment and Plan of Treatment: You had an increase in your kidney numbers when you came to the hospital. This is likely because you were dehydrated. We gave you IV fluids and the numbers improved.

## 2020-03-15 NOTE — PROGRESS NOTE ADULT - PROBLEM SELECTOR PLAN 1
Baseline AST/ALT wnl, Baseline Tbili range 1.2-2.6 from outpatient records, but now found to be elevated. DDx includes obstructive hepatopathy vs less likely cholecystitis (Negative Jaquez, afebrile), viral hepatitis panel negative. Cannot exclude autoimmune cause as panel pending  - Tox panel negative  - Hepatology c/s appreciated, will f/u labs as following: anti-mitochondrial, anti-smooth muscle, anti-LKM, anti-liver cytosol antibody, TEO, Immunoglobulin levels, Hep E  antibody and DNA PCR CMV  - Abd US showing gallbladder wall thickening, no evidence of stones,   - MRCP confirming US: gallbladder wall thickening, no stones, no sludge, no CBD dilatation, pancreatic cysts suspicious for IPMN  - Will d/w hepatology regarding ERCP/EUS given suspicion for obstructive process to r/o missed stone vs other etiology  - CMP/INR daily  - avoid liver toxic medications Baseline AST/ALT wnl, Baseline Tbili range 1.2-2.6 from outpatient records, but now found to be elevated. DDx includes obstructive hepatopathy vs less likely cholecystitis (Negative Jaquez, afebrile), viral hepatitis panel negative. Cannot exclude autoimmune cause as panel pending. Improving  - Tox panel negative  - Hepatology c/s appreciated, will f/u labs as following: anti-mitochondrial, anti-smooth muscle, anti-LKM, anti-liver cytosol antibody, TEO, Immunoglobulin levels, Hep E  antibody and DNA PCR CMV  - Abd US showing gallbladder wall thickening, no evidence of stones,   - MRCP confirming US: gallbladder wall thickening, no stones, no sludge, no CBD dilatation, pancreatic cysts suspicious for IPMN  - Will d/w hepatology regarding ERCP/EUS given suspicion for obstructive process to r/o missed stone vs other etiology  - CMP/INR daily  - avoid liver toxic medications

## 2020-03-15 NOTE — PROGRESS NOTE ADULT - SUBJECTIVE AND OBJECTIVE BOX
Author:   Tunde Tomas MD  Internal Medicine, PGY1  [phone]    Patient:  YESICA ROCHA  [id number]    Progress Note    Interval events: No acute events.  Pertinent ROS (if any):      Administered:  piperacillin/tazobactam IVPB..: 25 mL/Hr IV Intermittent (03-15 @ 05:30)  metoprolol succinate ER: 25 milliGRAM(s) Oral (03-15 @ 05:30)  heparin  Injectable: 5000 Unit(s) SubCutaneous (03-15 @ 05:30)  piperacillin/tazobactam IVPB..: 25 mL/Hr IV Intermittent (03-14 @ 22:16)  heparin  Injectable: 5000 Unit(s) SubCutaneous (03-14 @ 21:36)        OBJECTIVE:    03-14 @ 07:01  -  03-15 @ 07:00  --------------------------------------------------------  IN: 0 mL / OUT: 200 mL / NET: -200 mL      CAPILLARY BLOOD GLUCOSE            VITALS:  T(F): 98.2 (03-15-20 @ 04:39), Max: 98.2 (03-14-20 @ 13:32)  HR: 63 (03-15-20 @ 04:39) (63 - 66)  BP: 133/73 (03-15-20 @ 04:39) (133/73 - 136/78)  RR: 18 (03-15-20 @ 04:39) (18 - 18)  SpO2: 97% (03-15-20 @ 04:39) (97% - 97%)    PHYSICAL EXAM:  GENERAL: NAD, lying in bed comfortably  HEAD:  Atraumatic, Normocephalic  EYES: EOMI, PERRLA, conjunctiva and sclera clear  ENT: Moist mucous membranes  NECK: Supple, No JVD  CHEST/LUNG: Clear to auscultation bilaterally; No rales, rhonchi, wheezing, or rubs. Unlabored respirations  HEART: Regular rate and rhythm; No murmurs, rubs, or gallops  ABDOMEN: Bowel sounds present; Soft, Nontender, Nondistended. No hepatomegaly  EXTREMITIES:  2+ Peripheral Pulses, brisk capillary refill. No clubbing, cyanosis, or edema  NERVOUS SYSTEM:  Alert & Oriented X3, speech clear. No deficits   MSK: FROM all 4 extremities, full and equal strength  SKIN: No rashes or lesions    HOSPITAL MEDICATIONS:  Standing Meds:  heparin  Injectable 5000 Unit(s) SubCutaneous every 8 hours  lactated ringers. 1000 milliLiter(s) IV Continuous <Continuous>  metoprolol succinate ER 25 milliGRAM(s) Oral daily  piperacillin/tazobactam IVPB.. 3.375 Gram(s) IV Intermittent every 8 hours      PRN Meds:      LABS:  CBC 03-14-20 @ 07:47                        14.0   8.02  )-----------( 219                   40.8       Hgb trend: 14.0 <-- , 15.7 <-- , 16.0 <--   WBC trend: 8.02 <-- , 8.01 <-- , 9.73 <--       CMP 03-14-20 @ 07:47    137  |  98  |  26<H>  ----------------------------<  102<H>  3.1<L>   |  25  |  1.17    Ca    8.8      03-14-20 @ 07:47  Phos  3.2     03-14  Mg     2.2     03-14    TPro  6.6  /  Alb  3.3  /  TBili  8.0<H>  /  DBili  x   /  AST  321<H>  /  ALT  402<H>  /  AlkPhos  247<H>  03-14      Serum Cr trend: 1.17 <-- , 1.32 <-- , 1.57 <--     PT/INR - ( 13 Mar 2020 16:41 )   PT: 12.0 sec;   INR: 1.05 ratio         PTT - ( 14 Mar 2020 07:47 )  PTT:25.3 sec    ABG Trend:         MICROBIOLOGY:     Culture - Urine (collected 13 Mar 2020 22:05)  Source: .Urine Clean Catch (Midstream)  Final Report (14 Mar 2020 16:41):    No growth    Culture - Blood (collected 13 Mar 2020 19:00)  Source: .Blood Blood-Venous  Preliminary Report (14 Mar 2020 20:01):    No growth to date.        RADIOLOGY:  [ ] Reviewed and interpreted by me    EKG: Author:   Tunde Tomas MD  Internal Medicine, PGY1  [phone]    Patient:  YESICA ROCHA  [id number]    Progress Note    Interval events: No acute events. Patient slept well, no complaints. Denies abdominal pain, nausea, vomiting, diarrhea. Tolerating diet.     Pertinent ROS (if any):      Administered:  piperacillin/tazobactam IVPB..: 25 mL/Hr IV Intermittent (03-15 @ 05:30)  metoprolol succinate ER: 25 milliGRAM(s) Oral (03-15 @ 05:30)  heparin  Injectable: 5000 Unit(s) SubCutaneous (03-15 @ 05:30)  piperacillin/tazobactam IVPB..: 25 mL/Hr IV Intermittent (03-14 @ 22:16)  heparin  Injectable: 5000 Unit(s) SubCutaneous (03-14 @ 21:36)        OBJECTIVE:    03-14 @ 07:01  -  03-15 @ 07:00  --------------------------------------------------------  IN: 0 mL / OUT: 200 mL / NET: -200 mL      CAPILLARY BLOOD GLUCOSE            VITALS:  T(F): 98.2 (03-15-20 @ 04:39), Max: 98.2 (03-14-20 @ 13:32)  HR: 63 (03-15-20 @ 04:39) (63 - 66)  BP: 133/73 (03-15-20 @ 04:39) (133/73 - 136/78)  RR: 18 (03-15-20 @ 04:39) (18 - 18)  SpO2: 97% (03-15-20 @ 04:39) (97% - 97%)    PHYSICAL EXAM:  GENERAL: NAD, lying in bed comfortably  ENT: Moist mucous membranes  CHEST/LUNG: Clear to auscultation bilaterally; No rales, rhonchi, wheezing, or rubs. Unlabored respirations  HEART: Regular rate and rhythm; No murmurs, rubs, or gallops  ABDOMEN: Bowel sounds present; Soft, Nontender, Nondistended. No hepatomegaly  EXTREMITIES:  2+ Peripheral Pulses, brisk capillary refill. No clubbing, cyanosis, or edema  NERVOUS SYSTEM:  Alert & Oriented X3, speech clear. No deficits     HOSPITAL MEDICATIONS:  Standing Meds:  heparin  Injectable 5000 Unit(s) SubCutaneous every 8 hours  lactated ringers. 1000 milliLiter(s) IV Continuous <Continuous>  metoprolol succinate ER 25 milliGRAM(s) Oral daily  piperacillin/tazobactam IVPB.. 3.375 Gram(s) IV Intermittent every 8 hours      PRN Meds:      LABS:  CMP 03-15-20 @ 06:57    138  |  100  |  21  ----------------------------<  110<H>  3.4<L>   |  26  |  1.08    Ca    8.6      03-15-20 @ 06:57  Phos  3.1     03-15  Mg     2.1     03-15    TPro  6.4  /  Alb  3.2<L>  /  TBili  5.3<H>  /  DBili  x   /  AST  229<H>  /  ALT  395<H>  /  AlkPhos  250<H>  03-15      Serum Cr trend: 1.08 <-- , 1.17 <-- , 1.32 <-- , 1.57 <--     PT/INR - ( 13 Mar 2020 16:41 )   PT: 12.0 sec;   INR: 1.05 ratio           PTT - ( 14 Mar 2020 07:47 ):25.3 sec    MICRO    Culture - Urine (collected 03-13-20 @ 22:05)  Source: .Urine Clean Catch (Midstream)  Final Report (03-14-20 @ 16:41):    No growth    Culture - Blood (collected 03-13-20 @ 19:00)  Source: .Blood Blood-Venous  Preliminary Report (03-14-20 @ 20:01):    No growth to date.        EKG      IMAGING        MICROBIOLOGY:     Culture - Urine (collected 13 Mar 2020 22:05)  Source: .Urine Clean Catch (Midstream)  Final Report (14 Mar 2020 16:41):    No growth    Culture - Blood (collected 13 Mar 2020 19:00)  Source: .Blood Blood-Venous  Preliminary Report (14 Mar 2020 20:01):    No growth to date.        RADIOLOGY:  [ ] Reviewed and interpreted by me    EKG:

## 2020-03-15 NOTE — DISCHARGE NOTE PROVIDER - NSDCFUSCHEDAPPT_GEN_ALL_CORE_FT
YESICA ROCHA ; 06/03/2020 ; NPP Intmed [facility] YESICA ROCHA ; 06/03/2020 ; NPP Intmed [facility] YESICA ROCHA ; 06/03/2020 ; NPP Intmed [facility]

## 2020-03-15 NOTE — PROGRESS NOTE ADULT - ASSESSMENT
Pt is a 69 M w/ PMH of fatty liver, HTN, BPH, Gilbert's syndrome presenting sent from clinic for transaminitis and RHINA, imaging w/gallbladder distension and wall thickening w/o stones. Etiology likely obstructive hepatopathy, stable, awaiting further workup.

## 2020-03-15 NOTE — DISCHARGE NOTE PROVIDER - HOSPITAL COURSE
Pt is a 69 M w/ PMH of fatty liver, elevated AST and ALT, HTN, BPH, Gilbert's syndrome presenting from the clinic for abnormal lab findings. Pt reports he recently came from [place] for vacation 2 weeks ago and was in good health during that time, without any exposure to sick contacts. When pt returned, pt was also in good health until he visited his grandson last Thursday and since then he was having symptoms of nausea and had multiple episodes of NBNB vomiting associated with some generalized abdominal pain, which was present for 3 days then resolved. Denies eating any strange food while in [place]. Pt was also exhibiting some symptoms of chills, rhinitis but no coughs or fevers. At  this time, pt also noted to have some yellowing of the skin and eyes. Pt presented to his routine hepatology visit with Dr. Vasquez where he was found to have Crt of 1.92, AST/ALT of 309/314, total bili of 14, Alk phos in 200s. He was notified to urgently go to the ED after these findings. Otherwise, pt denied chest p/p, sob, diarrhea, constipation, or musculoskeletal pain. Of note, pt has been actively trying to lose weight the past 3 months for his fatty liver disease. Pt reports drinking about 2 beers a night, but denies excessive drinking.         At the ED, vitals wnl, CBC wnl, CMP significant for Cr of 1.5, AST/ALT of 357/438, Alk phos of 254, total bili of 13, direct 9.7, Lipase 282, Abd US showing Diffuse hepatic steatosis, Abd US w/distended gallbladder with diffuse wall thickening. Pt was given 1L NS. Pt admitted to medicine for further evaluation.         Patient suspected to have obstructive hepatopathy vs hepatitis related to recent travel. Hepatology was consulted and extensive labs, including viral hepatitis panel, serum tox, autoimmune panel, sent. Viral hep panel negative. Serum tox negative. Autoimmune panel pending. MRCP performed, showing gallbladder wall thickening, no stones, no sludge, no CBD dilatation, pancreatic cysts suspicious for IPMN. Patient was started on zosyn empirically for suspicion of cholecystitis but afebrile and never had RUQ pain. IVF given for pre-renal RHINA, resulting in improvement to baseline. Patient's symptoms resolved and he was able to tolerate regular diet without nausea/vomiting/abdominal pain. His transaminitis improved and T/D bili downtrended towards his baseline. Hepatology saw pt and cleared him for discharge - no further inpatient workup needed.         Pt is discharged home. He should follow-up with his PCP and with his hepatologist, Dr. Domingo.

## 2020-03-15 NOTE — PROGRESS NOTE ADULT - PROBLEM SELECTOR PLAN 3
Found to have gallbladder wall thickening on Abd US and distended gallbladder, but presentation not consistent  - c/w empiric treatment w/ zosyn  - continue to monitor

## 2020-03-15 NOTE — DISCHARGE NOTE PROVIDER - CARE PROVIDER_API CALL
John Domingo (MD)  Gastroenterology; Internal Medicine; Transplant Hepatology  [address] [place] [address]  Phone: [phone]  Fax: [phone]  Established Patient  Follow Up Time: 1 week

## 2020-03-15 NOTE — PROGRESS NOTE ADULT - PROBLEM SELECTOR PLAN 2
Possibly in setting of Acute liver failure, likely prerenal in nature, now resolved  - Continue to hold home losartan and hctz

## 2020-03-16 LAB — LKM AB SER-ACNC: <20.1 UNITS — SIGNIFICANT CHANGE UP (ref 0–20)

## 2020-03-17 LAB
CMV DNA CSF QL NAA+PROBE: SIGNIFICANT CHANGE UP
CMV DNA SPEC NAA+PROBE-LOG#: SIGNIFICANT CHANGE UP LOG10IU/ML
HSV1 AB FLD QL: SIGNIFICANT CHANGE UP TITER
HSV2 AB FLD-ACNC: SIGNIFICANT CHANGE UP TITER

## 2020-03-18 LAB
CULTURE RESULTS: SIGNIFICANT CHANGE UP
HEV AB FLD QL: NEGATIVE — SIGNIFICANT CHANGE UP
SPECIMEN SOURCE: SIGNIFICANT CHANGE UP

## 2020-03-19 ENCOUNTER — LABORATORY RESULT (OUTPATIENT)
Age: 70
End: 2020-03-19

## 2020-03-20 LAB
ALBUMIN SERPL ELPH-MCNC: 4.4 G/DL
ALP BLD-CCNC: 276 U/L
ALT SERPL-CCNC: 390 U/L
ANION GAP SERPL CALC-SCNC: 16 MMOL/L
AST SERPL-CCNC: 181 U/L
BASOPHILS # BLD AUTO: 0.07 K/UL
BASOPHILS NFR BLD AUTO: 0.9 %
BILIRUB DIRECT SERPL-MCNC: 1.4 MG/DL
BILIRUB SERPL-MCNC: 2.6 MG/DL
BUN SERPL-MCNC: 18 MG/DL
CALCIUM SERPL-MCNC: 9.3 MG/DL
CHLORIDE SERPL-SCNC: 100 MMOL/L
CO2 SERPL-SCNC: 24 MMOL/L
CREAT SERPL-MCNC: 1.06 MG/DL
EOSINOPHIL # BLD AUTO: 0.33 K/UL
EOSINOPHIL NFR BLD AUTO: 4.3 %
GLUCOSE SERPL-MCNC: 108 MG/DL
HCT VFR BLD CALC: 46.5 %
HGB BLD-MCNC: 14.8 G/DL
INR PPP: 1.01 RATIO
LYMPHOCYTES # BLD AUTO: 1.27 K/UL
LYMPHOCYTES NFR BLD AUTO: 16.5 %
MAN DIFF?: NORMAL
MCHC RBC-ENTMCNC: 31.1 PG
MCHC RBC-ENTMCNC: 31.8 GM/DL
MCV RBC AUTO: 97.7 FL
MONOCYTES # BLD AUTO: 0.54 K/UL
MONOCYTES NFR BLD AUTO: 7 %
NEUTROPHILS # BLD AUTO: 5.17 K/UL
NEUTROPHILS NFR BLD AUTO: 63.5 %
PLATELET # BLD AUTO: 412 K/UL
POTASSIUM SERPL-SCNC: 5.2 MMOL/L
PROT SERPL-MCNC: 7.4 G/DL
PT BLD: 11.5 SEC
RBC # BLD: 4.76 M/UL
RBC # FLD: 13.9 %
SODIUM SERPL-SCNC: 140 MMOL/L
WBC # FLD AUTO: 7.72 K/UL

## 2020-03-24 LAB — HEV IGM SER QL: ABNORMAL

## 2020-03-30 LAB
ALBUMIN SERPL ELPH-MCNC: 4.5 G/DL
ALP BLD-CCNC: 132 U/L
ALT SERPL-CCNC: 116 U/L
ANION GAP SERPL CALC-SCNC: 14 MMOL/L
AST SERPL-CCNC: 53 U/L
BASOPHILS # BLD AUTO: 0.05 K/UL
BASOPHILS NFR BLD AUTO: 0.6 %
BILIRUB DIRECT SERPL-MCNC: 1 MG/DL
BILIRUB SERPL-MCNC: 2.2 MG/DL
BUN SERPL-MCNC: 16 MG/DL
CALCIUM SERPL-MCNC: 9.6 MG/DL
CHLORIDE SERPL-SCNC: 100 MMOL/L
CK SERPL-CCNC: 55 U/L
CO2 SERPL-SCNC: 27 MMOL/L
CREAT SERPL-MCNC: 1.08 MG/DL
EOSINOPHIL # BLD AUTO: 0.12 K/UL
EOSINOPHIL NFR BLD AUTO: 1.3 %
HCT VFR BLD CALC: 45.1 %
HGB BLD-MCNC: 14.7 G/DL
IMM GRANULOCYTES NFR BLD AUTO: 1.1 %
INR PPP: 1.02 RATIO
LYMPHOCYTES # BLD AUTO: 2 K/UL
LYMPHOCYTES NFR BLD AUTO: 22.4 %
MAN DIFF?: NORMAL
MCHC RBC-ENTMCNC: 30.8 PG
MCHC RBC-ENTMCNC: 32.6 GM/DL
MCV RBC AUTO: 94.4 FL
MITOCHONDRIA AB SER IF-ACNC: NORMAL
MONOCYTES # BLD AUTO: 0.92 K/UL
MONOCYTES NFR BLD AUTO: 10.3 %
NEUTROPHILS # BLD AUTO: 5.74 K/UL
NEUTROPHILS NFR BLD AUTO: 64.3 %
PLATELET # BLD AUTO: 400 K/UL
POTASSIUM SERPL-SCNC: 4.3 MMOL/L
PROT SERPL-MCNC: 7.2 G/DL
PT BLD: 11.7 SEC
RBC # BLD: 4.78 M/UL
RBC # FLD: 12.4 %
SODIUM SERPL-SCNC: 140 MMOL/L
WBC # FLD AUTO: 8.93 K/UL

## 2020-04-03 DIAGNOSIS — R17 UNSPECIFIED JAUNDICE: ICD-10-CM

## 2020-04-13 LAB
ALBUMIN SERPL ELPH-MCNC: 4.3 G/DL
ALP BLD-CCNC: 71 U/L
ALT SERPL-CCNC: 55 U/L
ANION GAP SERPL CALC-SCNC: 10 MMOL/L
AST SERPL-CCNC: 35 U/L
BASOPHILS # BLD AUTO: 0.05 K/UL
BASOPHILS NFR BLD AUTO: 0.7 %
BILIRUB SERPL-MCNC: 1.8 MG/DL
BUN SERPL-MCNC: 15 MG/DL
CALCIUM SERPL-MCNC: 9.3 MG/DL
CHLORIDE SERPL-SCNC: 102 MMOL/L
CO2 SERPL-SCNC: 29 MMOL/L
CREAT SERPL-MCNC: 1.03 MG/DL
EOSINOPHIL # BLD AUTO: 0.23 K/UL
EOSINOPHIL NFR BLD AUTO: 3.2 %
HCT VFR BLD CALC: 40.9 %
HGB BLD-MCNC: 13.5 G/DL
IMM GRANULOCYTES NFR BLD AUTO: 1.1 %
INR PPP: 1.01 RATIO
LYMPHOCYTES # BLD AUTO: 2.47 K/UL
LYMPHOCYTES NFR BLD AUTO: 34.8 %
MAN DIFF?: NORMAL
MCHC RBC-ENTMCNC: 30.2 PG
MCHC RBC-ENTMCNC: 33 GM/DL
MCV RBC AUTO: 91.5 FL
MONOCYTES # BLD AUTO: 0.5 K/UL
MONOCYTES NFR BLD AUTO: 7 %
NEUTROPHILS # BLD AUTO: 3.77 K/UL
NEUTROPHILS NFR BLD AUTO: 53.2 %
PLATELET # BLD AUTO: 272 K/UL
POTASSIUM SERPL-SCNC: 4.4 MMOL/L
PROT SERPL-MCNC: 6.7 G/DL
PT BLD: 11.4 SEC
RBC # BLD: 4.47 M/UL
RBC # FLD: 13.2 %
SODIUM SERPL-SCNC: 141 MMOL/L
WBC # FLD AUTO: 7.1 K/UL

## 2020-04-17 ENCOUNTER — TRANSCRIPTION ENCOUNTER (OUTPATIENT)
Age: 70
End: 2020-04-17

## 2020-04-17 ENCOUNTER — INPATIENT (INPATIENT)
Facility: HOSPITAL | Age: 70
LOS: 4 days | Discharge: HOME | DRG: 438 | End: 2020-04-22
Attending: HOSPITALIST | Admitting: HOSPITALIST
Payer: MEDICARE

## 2020-04-17 VITALS
RESPIRATION RATE: 18 BRPM | DIASTOLIC BLOOD PRESSURE: 79 MMHG | HEIGHT: 73 IN | OXYGEN SATURATION: 98 % | HEART RATE: 91 BPM | WEIGHT: 231.93 LBS | SYSTOLIC BLOOD PRESSURE: 150 MMHG | TEMPERATURE: 99 F

## 2020-04-17 DIAGNOSIS — R74.0 NONSPECIFIC ELEVATION OF LEVELS OF TRANSAMINASE AND LACTIC ACID DEHYDROGENASE [LDH]: ICD-10-CM

## 2020-04-17 LAB
ALBUMIN SERPL ELPH-MCNC: 3.9 G/DL — SIGNIFICANT CHANGE UP (ref 3.3–5)
ALP SERPL-CCNC: 192 U/L — HIGH (ref 40–120)
ALT FLD-CCNC: 393 U/L — HIGH (ref 10–45)
ANION GAP SERPL CALC-SCNC: 18 MMOL/L — HIGH (ref 5–17)
AST SERPL-CCNC: 226 U/L — HIGH (ref 10–40)
BASE EXCESS BLDV CALC-SCNC: 1.5 MMOL/L — SIGNIFICANT CHANGE UP (ref -2–2)
BASOPHILS # BLD AUTO: 0.04 K/UL — SIGNIFICANT CHANGE UP (ref 0–0.2)
BASOPHILS NFR BLD AUTO: 0.3 % — SIGNIFICANT CHANGE UP (ref 0–2)
BILIRUB DIRECT SERPL-MCNC: 8.1 MG/DL — HIGH (ref 0–0.2)
BILIRUB INDIRECT FLD-MCNC: 4.4 MG/DL — HIGH (ref 0.2–1)
BILIRUB SERPL-MCNC: 12.5 MG/DL — HIGH (ref 0.2–1.2)
BILIRUB SERPL-MCNC: 12.5 MG/DL — HIGH (ref 0.2–1.2)
BUN SERPL-MCNC: 24 MG/DL — HIGH (ref 7–23)
CALCIUM SERPL-MCNC: 9.5 MG/DL — SIGNIFICANT CHANGE UP (ref 8.4–10.5)
CHLORIDE SERPL-SCNC: 98 MMOL/L — SIGNIFICANT CHANGE UP (ref 96–108)
CO2 BLDV-SCNC: 28 MMOL/L — SIGNIFICANT CHANGE UP (ref 22–30)
CO2 SERPL-SCNC: 22 MMOL/L — SIGNIFICANT CHANGE UP (ref 22–31)
CREAT SERPL-MCNC: 1.56 MG/DL — HIGH (ref 0.5–1.3)
EOSINOPHIL # BLD AUTO: 0.01 K/UL — SIGNIFICANT CHANGE UP (ref 0–0.5)
EOSINOPHIL NFR BLD AUTO: 0.1 % — SIGNIFICANT CHANGE UP (ref 0–6)
GAS PNL BLDV: SIGNIFICANT CHANGE UP
GLUCOSE SERPL-MCNC: 113 MG/DL — HIGH (ref 70–99)
HCO3 BLDV-SCNC: 26 MMOL/L — SIGNIFICANT CHANGE UP (ref 21–29)
HCT VFR BLD CALC: 39.7 % — SIGNIFICANT CHANGE UP (ref 39–50)
HGB BLD-MCNC: 14 G/DL — SIGNIFICANT CHANGE UP (ref 13–17)
IMM GRANULOCYTES NFR BLD AUTO: 0.7 % — SIGNIFICANT CHANGE UP (ref 0–1.5)
LIDOCAIN IGE QN: 1209 U/L — HIGH (ref 7–60)
LYMPHOCYTES # BLD AUTO: 1.18 K/UL — SIGNIFICANT CHANGE UP (ref 1–3.3)
LYMPHOCYTES # BLD AUTO: 7.8 % — LOW (ref 13–44)
MCHC RBC-ENTMCNC: 31.7 PG — SIGNIFICANT CHANGE UP (ref 27–34)
MCHC RBC-ENTMCNC: 35.3 GM/DL — SIGNIFICANT CHANGE UP (ref 32–36)
MCV RBC AUTO: 89.8 FL — SIGNIFICANT CHANGE UP (ref 80–100)
MONOCYTES # BLD AUTO: 1.1 K/UL — HIGH (ref 0–0.9)
MONOCYTES NFR BLD AUTO: 7.3 % — SIGNIFICANT CHANGE UP (ref 2–14)
NEUTROPHILS # BLD AUTO: 12.61 K/UL — HIGH (ref 1.8–7.4)
NEUTROPHILS NFR BLD AUTO: 83.8 % — HIGH (ref 43–77)
NRBC # BLD: 0 /100 WBCS — SIGNIFICANT CHANGE UP (ref 0–0)
PCO2 BLDV: 45 MMHG — SIGNIFICANT CHANGE UP (ref 35–50)
PH BLDV: 7.39 — SIGNIFICANT CHANGE UP (ref 7.35–7.45)
PLATELET # BLD AUTO: 229 K/UL — SIGNIFICANT CHANGE UP (ref 150–400)
PO2 BLDV: 48 MMHG — HIGH (ref 25–45)
POTASSIUM SERPL-MCNC: 3.2 MMOL/L — LOW (ref 3.5–5.3)
POTASSIUM SERPL-SCNC: 3.2 MMOL/L — LOW (ref 3.5–5.3)
PROT SERPL-MCNC: 7.1 G/DL — SIGNIFICANT CHANGE UP (ref 6–8.3)
RBC # BLD: 4.42 M/UL — SIGNIFICANT CHANGE UP (ref 4.2–5.8)
RBC # FLD: 14.1 % — SIGNIFICANT CHANGE UP (ref 10.3–14.5)
SAO2 % BLDV: 84 % — SIGNIFICANT CHANGE UP (ref 67–88)
SODIUM SERPL-SCNC: 138 MMOL/L — SIGNIFICANT CHANGE UP (ref 135–145)
WBC # BLD: 15.05 K/UL — HIGH (ref 3.8–10.5)
WBC # FLD AUTO: 15.05 K/UL — HIGH (ref 3.8–10.5)

## 2020-04-17 PROCEDURE — 99284 EMERGENCY DEPT VISIT MOD MDM: CPT | Mod: GC

## 2020-04-17 PROCEDURE — 76705 ECHO EXAM OF ABDOMEN: CPT | Mod: 26,RT

## 2020-04-17 PROCEDURE — 74177 CT ABD & PELVIS W/CONTRAST: CPT | Mod: 26

## 2020-04-17 RX ORDER — SODIUM CHLORIDE 9 MG/ML
1000 INJECTION INTRAMUSCULAR; INTRAVENOUS; SUBCUTANEOUS ONCE
Refills: 0 | Status: COMPLETED | OUTPATIENT
Start: 2020-04-17 | End: 2020-04-17

## 2020-04-17 RX ADMIN — SODIUM CHLORIDE 1000 MILLILITER(S): 9 INJECTION INTRAMUSCULAR; INTRAVENOUS; SUBCUTANEOUS at 19:55

## 2020-04-17 NOTE — ED PROVIDER NOTE - NS ED ROS FT
REVIEW OF SYSTEMS:  General: +night sweats, +weight loss, +decreased appetitie  Cardiac: no chest pain  Respiratory: no cough, no shortness of breath  Gastrointestinal: + abdominal pain, + nausea, no vomiting, no diarrhea, no melena, no hematochezia   Skin: +jaundice  -Dawn Ulloa PGY-2

## 2020-04-17 NOTE — ED ADULT NURSE NOTE - OBJECTIVE STATEMENT
Male 69 years old with past medical history of HTN and Fatty liver came in for generalized abdominal pain associated with nausea for 3-4 days non radiating worse after eating. Pt states he had blood works last Wednesday with normal results. Reports also chills and cold sweats at night. Denies cough, chest pain, sob or urinary symptoms. Pt's sclerae and skin juandice. Labs obtained. Will continue to reassess.

## 2020-04-17 NOTE — ED ADULT NURSE NOTE - CHIEF COMPLAINT QUOTE
sent by urgent care for jaundice; abdominal pain when eating; chills and cold sweats at night   Hx admitted last month for elevated liver and kidney enzymes

## 2020-04-17 NOTE — ED ADULT NURSE NOTE - CHPI ED NUR SYMPTOMS NEG
no diarrhea/no nausea/no burning urination/no dysuria/no hematuria/no vomiting/no fever/no abdominal distension

## 2020-04-17 NOTE — ED ADULT NURSE NOTE - IN THE PAST 12 MONTHS HAVE YOU USED DRUGS OTHER THAN THOSE REQUIRED FOR MEDICAL REASON?
I will SWITCH the dose or number of times a day I take the medications listed below when I get home from the hospital:  None No

## 2020-04-17 NOTE — ED PROVIDER NOTE - PHYSICAL EXAMINATION
PHYSICAL EXAM:   General: in no acute distress, appears jaundiced  HEENT: NC/AT, airway patent  Cardiovascular: regular rate and rhythm, + S1/S2, no murmurs, rubs, gallops appreciated  Respiratory: clear to auscultation bilaterally, good aeration bilaterally, nonlabored respirations  Abdominal: soft, nontender, nondistended, no rebound, guarding or rigidity, no murphys sign  Neuro: Awake, alert and interactive.   Psychiatric: appropriate mood and affect.   Skin: jaundiced skin, noticeable on abdomen and face around the scalp line  -Dawn Ulloa PGY-2

## 2020-04-17 NOTE — ED PROVIDER NOTE - CLINICAL SUMMARY MEDICAL DECISION MAKING FREE TEXT BOX
68 yo hx htn and gilberts p/w jaundice, weight loss, abd pain after eating and night sweats. Had recent MRI suggestive of IPMN. DDx includes but not limited to obstructive biliary pathology, including obstructing stones and neoplasm, as well as pancreatitis. Will get labs (including lipase), imaging (CT abd/pelvis with IV contrast) and pain control as needed. Dispo pending. 70 yo hx htn and gilberts p/w jaundice, weight loss, abd pain after eating and night sweats. Had recent MRI suggestive of IPMN. DDx includes but not limited to obstructive biliary pathology, including obstructing stones and neoplasm, as well as pancreatitis. Will get labs (including lipase), imaging (CT abd/pelvis with IV contrast) and pain control as needed. Dispo pending.    Attending Statement: Agree with the above except where noted as follows -- presenation/ddx as above.  Unlikely to represent cholangitis or cholecystitis given lack of ruq tender to palpation or fever.  Plan as above.  Likely will need admission for MRCP.  Consider GI c/s.  --BMM

## 2020-04-17 NOTE — ED ADULT TRIAGE NOTE - CHIEF COMPLAINT QUOTE
sent by urgent care for jaundice   Hx admitted last month for elevated liver and kidney enzymes sent by urgent care for jaundice; abdominal pain when eating   Hx admitted last month for elevated liver and kidney enzymes sent by urgent care for jaundice; abdominal pain when eating; chills and cold sweats at night   Hx admitted last month for elevated liver and kidney enzymes

## 2020-04-17 NOTE — ED PROVIDER NOTE - CARE PLAN
Principal Discharge DX:	Transaminitis  Secondary Diagnosis:	Jaundice  Secondary Diagnosis:	Pancreatitis

## 2020-04-18 DIAGNOSIS — D49.0 NEOPLASM OF UNSPECIFIED BEHAVIOR OF DIGESTIVE SYSTEM: ICD-10-CM

## 2020-04-18 DIAGNOSIS — E87.6 HYPOKALEMIA: ICD-10-CM

## 2020-04-18 DIAGNOSIS — N17.9 ACUTE KIDNEY FAILURE, UNSPECIFIED: ICD-10-CM

## 2020-04-18 DIAGNOSIS — K85.10 BILIARY ACUTE PANCREATITIS WITHOUT NECROSIS OR INFECTION: ICD-10-CM

## 2020-04-18 DIAGNOSIS — E80.6 OTHER DISORDERS OF BILIRUBIN METABOLISM: ICD-10-CM

## 2020-04-18 DIAGNOSIS — D72.829 ELEVATED WHITE BLOOD CELL COUNT, UNSPECIFIED: ICD-10-CM

## 2020-04-18 DIAGNOSIS — R74.0 NONSPECIFIC ELEVATION OF LEVELS OF TRANSAMINASE AND LACTIC ACID DEHYDROGENASE [LDH]: ICD-10-CM

## 2020-04-18 PROBLEM — N40.0 BENIGN PROSTATIC HYPERPLASIA WITHOUT LOWER URINARY TRACT SYMPTOMS: Chronic | Status: ACTIVE | Noted: 2020-03-13

## 2020-04-18 PROBLEM — E80.4 GILBERT SYNDROME: Chronic | Status: ACTIVE | Noted: 2020-03-13

## 2020-04-18 PROBLEM — I10 ESSENTIAL (PRIMARY) HYPERTENSION: Chronic | Status: ACTIVE | Noted: 2020-03-13

## 2020-04-18 PROBLEM — K76.0 FATTY (CHANGE OF) LIVER, NOT ELSEWHERE CLASSIFIED: Chronic | Status: ACTIVE | Noted: 2020-03-13

## 2020-04-18 LAB
ALBUMIN SERPL ELPH-MCNC: 3.5 G/DL — SIGNIFICANT CHANGE UP (ref 3.3–5)
ALP SERPL-CCNC: 158 U/L — HIGH (ref 40–120)
ALT FLD-CCNC: 293 U/L — HIGH (ref 10–45)
ANION GAP SERPL CALC-SCNC: 14 MMOL/L — SIGNIFICANT CHANGE UP (ref 5–17)
APTT BLD: 28.3 SEC — SIGNIFICANT CHANGE UP (ref 27.5–36.3)
AST SERPL-CCNC: 151 U/L — HIGH (ref 10–40)
BASOPHILS # BLD AUTO: 0.03 K/UL — SIGNIFICANT CHANGE UP (ref 0–0.2)
BASOPHILS NFR BLD AUTO: 0.4 % — SIGNIFICANT CHANGE UP (ref 0–2)
BILIRUB SERPL-MCNC: 10.3 MG/DL — HIGH (ref 0.2–1.2)
BLD GP AB SCN SERPL QL: NEGATIVE — SIGNIFICANT CHANGE UP
BUN SERPL-MCNC: 23 MG/DL — SIGNIFICANT CHANGE UP (ref 7–23)
CALCIUM SERPL-MCNC: 9 MG/DL — SIGNIFICANT CHANGE UP (ref 8.4–10.5)
CANCER AG19-9 SERPL-ACNC: 1097 U/ML — HIGH
CHLORIDE SERPL-SCNC: 99 MMOL/L — SIGNIFICANT CHANGE UP (ref 96–108)
CO2 SERPL-SCNC: 24 MMOL/L — SIGNIFICANT CHANGE UP (ref 22–31)
CREAT SERPL-MCNC: 1.26 MG/DL — SIGNIFICANT CHANGE UP (ref 0.5–1.3)
EOSINOPHIL # BLD AUTO: 0.12 K/UL — SIGNIFICANT CHANGE UP (ref 0–0.5)
EOSINOPHIL NFR BLD AUTO: 1.4 % — SIGNIFICANT CHANGE UP (ref 0–6)
GGT SERPL-CCNC: 335 U/L — HIGH (ref 9–50)
GLUCOSE SERPL-MCNC: 82 MG/DL — SIGNIFICANT CHANGE UP (ref 70–99)
HAV IGM SER-ACNC: SIGNIFICANT CHANGE UP
HBV CORE IGM SER-ACNC: SIGNIFICANT CHANGE UP
HBV SURFACE AG SER-ACNC: SIGNIFICANT CHANGE UP
HCT VFR BLD CALC: 34.3 % — LOW (ref 39–50)
HCV AB S/CO SERPL IA: 0.17 S/CO — SIGNIFICANT CHANGE UP (ref 0–0.99)
HCV AB SERPL-IMP: SIGNIFICANT CHANGE UP
HGB BLD-MCNC: 11.8 G/DL — LOW (ref 13–17)
IMM GRANULOCYTES NFR BLD AUTO: 0.7 % — SIGNIFICANT CHANGE UP (ref 0–1.5)
INR BLD: 1.14 RATIO — SIGNIFICANT CHANGE UP (ref 0.88–1.16)
LYMPHOCYTES # BLD AUTO: 1.45 K/UL — SIGNIFICANT CHANGE UP (ref 1–3.3)
LYMPHOCYTES # BLD AUTO: 17.2 % — SIGNIFICANT CHANGE UP (ref 13–44)
MAGNESIUM SERPL-MCNC: 2.3 MG/DL — SIGNIFICANT CHANGE UP (ref 1.6–2.6)
MCHC RBC-ENTMCNC: 31.1 PG — SIGNIFICANT CHANGE UP (ref 27–34)
MCHC RBC-ENTMCNC: 34.4 GM/DL — SIGNIFICANT CHANGE UP (ref 32–36)
MCV RBC AUTO: 90.3 FL — SIGNIFICANT CHANGE UP (ref 80–100)
MONOCYTES # BLD AUTO: 0.77 K/UL — SIGNIFICANT CHANGE UP (ref 0–0.9)
MONOCYTES NFR BLD AUTO: 9.1 % — SIGNIFICANT CHANGE UP (ref 2–14)
NEUTROPHILS # BLD AUTO: 6.02 K/UL — SIGNIFICANT CHANGE UP (ref 1.8–7.4)
NEUTROPHILS NFR BLD AUTO: 71.2 % — SIGNIFICANT CHANGE UP (ref 43–77)
NRBC # BLD: 0 /100 WBCS — SIGNIFICANT CHANGE UP (ref 0–0)
PHOSPHATE SERPL-MCNC: 3.3 MG/DL — SIGNIFICANT CHANGE UP (ref 2.5–4.5)
PLATELET # BLD AUTO: 199 K/UL — SIGNIFICANT CHANGE UP (ref 150–400)
POTASSIUM SERPL-MCNC: 3.1 MMOL/L — LOW (ref 3.5–5.3)
POTASSIUM SERPL-SCNC: 3.1 MMOL/L — LOW (ref 3.5–5.3)
PROCALCITONIN SERPL-MCNC: 0.56 NG/ML — HIGH (ref 0.02–0.1)
PROT SERPL-MCNC: 6.3 G/DL — SIGNIFICANT CHANGE UP (ref 6–8.3)
PROTHROM AB SERPL-ACNC: 13 SEC — HIGH (ref 10–12.9)
RBC # BLD: 3.8 M/UL — LOW (ref 4.2–5.8)
RBC # FLD: 13.9 % — SIGNIFICANT CHANGE UP (ref 10.3–14.5)
RH IG SCN BLD-IMP: POSITIVE — SIGNIFICANT CHANGE UP
SODIUM SERPL-SCNC: 137 MMOL/L — SIGNIFICANT CHANGE UP (ref 135–145)
WBC # BLD: 8.45 K/UL — SIGNIFICANT CHANGE UP (ref 3.8–10.5)
WBC # FLD AUTO: 8.45 K/UL — SIGNIFICANT CHANGE UP (ref 3.8–10.5)

## 2020-04-18 PROCEDURE — 12345: CPT | Mod: NC

## 2020-04-18 PROCEDURE — 99223 1ST HOSP IP/OBS HIGH 75: CPT

## 2020-04-18 RX ORDER — HEPARIN SODIUM 5000 [USP'U]/ML
5000 INJECTION INTRAVENOUS; SUBCUTANEOUS THREE TIMES A DAY
Refills: 0 | Status: DISCONTINUED | OUTPATIENT
Start: 2020-04-18 | End: 2020-04-22

## 2020-04-18 RX ORDER — POTASSIUM CHLORIDE 20 MEQ
40 PACKET (EA) ORAL ONCE
Refills: 0 | Status: COMPLETED | OUTPATIENT
Start: 2020-04-18 | End: 2020-04-18

## 2020-04-18 RX ORDER — SENNA PLUS 8.6 MG/1
2 TABLET ORAL AT BEDTIME
Refills: 0 | Status: DISCONTINUED | OUTPATIENT
Start: 2020-04-18 | End: 2020-04-22

## 2020-04-18 RX ORDER — SODIUM CHLORIDE 9 MG/ML
1000 INJECTION, SOLUTION INTRAVENOUS
Refills: 0 | Status: COMPLETED | OUTPATIENT
Start: 2020-04-18 | End: 2020-04-19

## 2020-04-18 RX ORDER — ASPIRIN/CALCIUM CARB/MAGNESIUM 324 MG
81 TABLET ORAL DAILY
Refills: 0 | Status: DISCONTINUED | OUTPATIENT
Start: 2020-04-18 | End: 2020-04-22

## 2020-04-18 RX ORDER — POTASSIUM CHLORIDE 20 MEQ
10 PACKET (EA) ORAL
Refills: 0 | Status: COMPLETED | OUTPATIENT
Start: 2020-04-18 | End: 2020-04-18

## 2020-04-18 RX ORDER — METOPROLOL TARTRATE 50 MG
25 TABLET ORAL DAILY
Refills: 0 | Status: DISCONTINUED | OUTPATIENT
Start: 2020-04-18 | End: 2020-04-22

## 2020-04-18 RX ORDER — SODIUM CHLORIDE 9 MG/ML
1000 INJECTION, SOLUTION INTRAVENOUS
Refills: 0 | Status: DISCONTINUED | OUTPATIENT
Start: 2020-04-18 | End: 2020-04-18

## 2020-04-18 RX ADMIN — HEPARIN SODIUM 5000 UNIT(S): 5000 INJECTION INTRAVENOUS; SUBCUTANEOUS at 22:11

## 2020-04-18 RX ADMIN — Medication 100 MILLIEQUIVALENT(S): at 04:28

## 2020-04-18 RX ADMIN — Medication 40 MILLIEQUIVALENT(S): at 12:02

## 2020-04-18 RX ADMIN — Medication 25 MILLIGRAM(S): at 06:28

## 2020-04-18 RX ADMIN — HEPARIN SODIUM 5000 UNIT(S): 5000 INJECTION INTRAVENOUS; SUBCUTANEOUS at 13:20

## 2020-04-18 RX ADMIN — Medication 40 MILLIEQUIVALENT(S): at 04:25

## 2020-04-18 RX ADMIN — HEPARIN SODIUM 5000 UNIT(S): 5000 INJECTION INTRAVENOUS; SUBCUTANEOUS at 06:28

## 2020-04-18 RX ADMIN — Medication 100 MILLIEQUIVALENT(S): at 06:23

## 2020-04-18 RX ADMIN — SODIUM CHLORIDE 200 MILLILITER(S): 9 INJECTION, SOLUTION INTRAVENOUS at 04:28

## 2020-04-18 RX ADMIN — Medication 81 MILLIGRAM(S): at 12:02

## 2020-04-18 NOTE — H&P ADULT - NSHPLABSRESULTS_GEN_ALL_CORE
Personally reviewed labs.   Personally reviewed imaging.                         14.0   15.05 )-----------( 229      ( 17 Apr 2020 18:52 )             39.7       04-17    138  |  98  |  24<H>  ----------------------------<  113<H>  3.2<L>   |  22  |  1.56<H>    Ca    9.5      17 Apr 2020 18:52    TPro  7.1  /  Alb  3.9  /  TBili  12.5<H>  /  DBili  8.1<H>  /  AST  226<H>  /  ALT  393<H>  /  AlkPhos  192<H>  04-17            LIVER FUNCTIONS - ( 17 Apr 2020 18:52 )  Alb: 3.9 g/dL / Pro: 7.1 g/dL / ALK PHOS: 192 U/L / ALT: 393 U/L / AST: 226 U/L / GGT: x             PT/INR - ( 17 Apr 2020 23:37 )   PT: 13.0 sec;   INR: 1.14 ratio         PTT - ( 17 Apr 2020 23:37 )  PTT:28.3 sec

## 2020-04-18 NOTE — H&P ADULT - HISTORY OF PRESENT ILLNESS
69M c hx gilbert's disease, HTN, DVT on ASA only, poss IPMN, hepatic steatosis, recent hospitalization for hyperbilirubinemia/RHINA/thickened GB of unclear etiol (Mar '20), pw abd pain and jaundice.    Pt states he's followed with hepatology as outpt for gilbert's disease for a few years. In March, pt was admitted for similar presentation to this time, with etiology unknown, but poss hepatitis E infection. See chart for hospital course. Pt d/c'd home with improving Tbili. Pt states he had outpt labs approximately 1 week ago, and was told those labs were ?"normal". Pt states that about 4 days ago, he's started to have post-prandial abd pain after eating dinner. Pt also reports 4 days of night sweats, chills, nausea. Yesterday, pt went to urgent care for further evaluation of the abd pain, and at urgent care, pt was found to be jaundiced and was told to come to the hospital. Pt denies any sick contacts, sore throat, cough, diarrhea. Pt was last in [place] about 2 months ago.    VS: Tm 98.9, P 91, /76, R 18, 97% RA  In the ED, received NS 1L

## 2020-04-18 NOTE — CONSULT NOTE ADULT - SUBJECTIVE AND OBJECTIVE BOX
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 1000 milliLiter(s) IV Continuous <Continuous>  metoprolol succinate ER 25 milliGRAM(s) Oral daily  senna 2 Tablet(s) Oral at bedtime      PMHX/PSHX:  BPH (benign prostatic hyperplasia)  Gilbert disease  Fatty liver  Hypertension  No pertinent past medical history  No significant past surgical history  H/O breast biopsy  No significant past surgical history      Family history:  FH: breast cancer  No pertinent family history in first degree relatives      Denies family history of colon cancer/polyps, stomach cancer/polyps, pancreatic cancer/masses, liver cancer/disease, ovarian cancer and endometrial cancer.    Social History:     Tob: Denies  EtOH: Denies  Illicit Drugs: Denies    ROS:     General:  No wt loss, fevers, chills, night sweats, fatigue  Eyes:  Good vision, no reported pain  ENT:  No sore throat, pain, runny nose, dysphagia  CV:  No pain, palpitations, hypo/hypertension  Pulm:  No dyspnea, cough, tachypnea, wheezing  GI:  No pain, No nausea, No vomiting, No diarrhea, No constipation, No weight loss, No fever, No pruritis, No rectal bleeding, No tarry stools, No dysphagia,  :  No pain, bleeding, incontinence, nocturia  Muscle:  No pain, weakness  Neuro:  No weakness, tingling, memory problems  Psych:  No fatigue, insomnia, mood problems, depression  Endocrine:  No polyuria, polydipsia, cold/heat intolerance  Heme:  No petechiae, ecchymosis, easy bruisability  Skin:  No rash, tattoos, scars, edema    PHYSICAL EXAM:     GENERAL:  No acute distress  HEENT:  Normocephalic/atraumatic, no scleral icterus  CHEST:  Clear to auscultation bilaterally, no wheezes/rales/ronchi, no accessory muscle use  HEART:  Regular rate and rhythm, no murmurs/rubs/gallops  ABDOMEN:  Soft, non-tender, non-distended, normoactive bowel sounds,  no masses, no hepato-splenomegaly, no signs of chronic liver disease  EXTREMITIES: No cyanosis, clubbing, or edema  SKIN:  No rash/erythema/ecchymoses/petechiae/wounds/abscess/warm/dry  NEURO:  Alert and oriented x 3, no asterixis    Vital Signs:  Vital Signs Last 24 Hrs  T(C): 36.8 (18 Apr 2020 08:37), Max: 37.2 (17 Apr 2020 16:53)  T(F): 98.2 (18 Apr 2020 08:37), Max: 98.9 (17 Apr 2020 16:53)  HR: 65 (18 Apr 2020 08:37) (64 - 91)  BP: 115/9 (18 Apr 2020 08:37) (115/9 - 156/83)  BP(mean): 94 (17 Apr 2020 19:15) (94 - 94)  RR: 18 (18 Apr 2020 08:37) (14 - 18)  SpO2: 94% (18 Apr 2020 04:14) (94% - 98%)  Daily Height in cm: 185.42 (17 Apr 2020 16:53)    Daily     LABS:                        11.8   8.45  )-----------( 199      ( 18 Apr 2020 06:53 )             34.3     Mean Cell Volume: 90.3 fl (04-18-20 @ 06:53)    04-18    137  |  99  |  23  ----------------------------<  82  3.1<L>   |  24  |  1.26    Ca    9.0      18 Apr 2020 06:44  Phos  3.3     04-18  Mg     2.3     04-18    TPro  6.3  /  Alb  3.5  /  TBili  10.3<H>  /  DBili  x   /  AST  151<H>  /  ALT  293<H>  /  AlkPhos  158<H>  04-18    LIVER FUNCTIONS - ( 18 Apr 2020 09:27 )  Alb: x     / Pro: x     / ALK PHOS: x     / ALT: x     / AST: x     / GGT: 335 U/L       PT/INR - ( 17 Apr 2020 23:37 )   PT: 13.0 sec;   INR: 1.14 ratio         PTT - ( 17 Apr 2020 23:37 )  PTT:28.3 sec    Amylase Serum--      Lipase [id number]       Ammonia--                          11.8   8.45  )-----------( 199      ( 18 Apr 2020 06:53 )             34.3                         14.0   15.05 )-----------( 229      ( 17 Apr 2020 18:52 )             39.7       Imaging: Chief Complaint: Abdominal pain    HPI:    68 y/o M w/ hx of HTN, Gilbert's syndrome, hepatic steatosis, and pancreatic cysts likely representing IPMN based on MRI read, with recent hospitalization for elevated bilirubin in setting of gastroenteritis, with negative viral hepatitis and chronic liver disease work-up, presenting with abdominal pain x 4 days (now resolved) and found to be jaundiced at an urgent care, admitted to [facility] for further evaluation.    The patient endorses     Allergies:  lactose (Flatulence; Diarrhea)  No Known Allergies      Home Medications:    Hospital Medications:  aspirin enteric coated 81 milliGRAM(s) Oral daily  heparin   Injectable 5000 Unit(s) SubCutaneous three times a day  lactated ringers. 1000 milliLiter(s) IV Continuous <Continuous>  metoprolol succinate ER 25 milliGRAM(s) Oral daily  senna 2 Tablet(s) Oral at bedtime      PMHX/PSHX:  BPH (benign prostatic hyperplasia)  Gilbert disease  Fatty liver  Hypertension  No pertinent past medical history  No significant past surgical history  H/O breast biopsy  No significant past surgical history      Family history:  FH: breast cancer  No pertinent family history in first degree relatives      Denies family history of colon cancer/polyps, stomach cancer/polyps, pancreatic cancer/masses, liver cancer/disease, ovarian cancer and endometrial cancer.    Social History:     Tob: Denies  EtOH: Denies  Illicit Drugs: Denies    ROS:     General:  No wt loss, fevers, chills, night sweats, fatigue  Eyes:  Good vision, no reported pain  ENT:  No sore throat, pain, runny nose, dysphagia  CV:  No pain, palpitations, hypo/hypertension  Pulm:  No dyspnea, cough, tachypnea, wheezing  GI:  No pain, No nausea, No vomiting, No diarrhea, No constipation, No weight loss, No fever, No pruritis, No rectal bleeding, No tarry stools, No dysphagia,  :  No pain, bleeding, incontinence, nocturia  Muscle:  No pain, weakness  Neuro:  No weakness, tingling, memory problems  Psych:  No fatigue, insomnia, mood problems, depression  Endocrine:  No polyuria, polydipsia, cold/heat intolerance  Heme:  No petechiae, ecchymosis, easy bruisability  Skin:  No rash, tattoos, scars, edema    PHYSICAL EXAM:     GENERAL:  No acute distress  HEENT:  Normocephalic/atraumatic, no scleral icterus  CHEST:  Clear to auscultation bilaterally, no wheezes/rales/ronchi, no accessory muscle use  HEART:  Regular rate and rhythm, no murmurs/rubs/gallops  ABDOMEN:  Soft, non-tender, non-distended, normoactive bowel sounds,  no masses, no hepato-splenomegaly, no signs of chronic liver disease  EXTREMITIES: No cyanosis, clubbing, or edema  SKIN:  No rash/erythema/ecchymoses/petechiae/wounds/abscess/warm/dry  NEURO:  Alert and oriented x 3, no asterixis    Vital Signs:  Vital Signs Last 24 Hrs  T(C): 36.8 (18 Apr 2020 08:37), Max: 37.2 (17 Apr 2020 16:53)  T(F): 98.2 (18 Apr 2020 08:37), Max: 98.9 (17 Apr 2020 16:53)  HR: 65 (18 Apr 2020 08:37) (64 - 91)  BP: 115/9 (18 Apr 2020 08:37) (115/9 - 156/83)  BP(mean): 94 (17 Apr 2020 19:15) (94 - 94)  RR: 18 (18 Apr 2020 08:37) (14 - 18)  SpO2: 94% (18 Apr 2020 04:14) (94% - 98%)  Daily Height in cm: 185.42 (17 Apr 2020 16:53)    Daily     LABS:                        11.8   8.45  )-----------( 199      ( 18 Apr 2020 06:53 )             34.3     Mean Cell Volume: 90.3 fl (04-18-20 @ 06:53)    04-18    137  |  99  |  23  ----------------------------<  82  3.1<L>   |  24  |  1.26    Ca    9.0      18 Apr 2020 06:44  Phos  3.3     04-18  Mg     2.3     04-18    TPro  6.3  /  Alb  3.5  /  TBili  10.3<H>  /  DBili  x   /  AST  151<H>  /  ALT  293<H>  /  AlkPhos  158<H>  04-18    LIVER FUNCTIONS - ( 18 Apr 2020 09:27 )  Alb: x     / Pro: x     / ALK PHOS: x     / ALT: x     / AST: x     / GGT: 335 U/L       PT/INR - ( 17 Apr 2020 23:37 )   PT: 13.0 sec;   INR: 1.14 ratio         PTT - ( 17 Apr 2020 23:37 )  PTT:28.3 sec    Amylase Serum--      Lipase [id number]       Ammonia--                          11.8   8.45  )-----------( 199      ( 18 Apr 2020 06:53 )             34.3                         14.0   15.05 )-----------( 229      ( 17 Apr 2020 18:52 )             39.7       Imaging: Chief Complaint: Abdominal pain    HPI:    68 y/o M w/ hx of HTN, Gilbert's syndrome, hepatic steatosis, and pancreatic cysts likely representing IPMN based on MRI read, with recent hospitalization for elevated bilirubin in setting of gastroenteritis, with negative viral hepatitis and chronic liver disease work-up, presenting with abdominal pain x 4 days (now resolved) and found to be jaundiced at an urgent care, diagnosed with acute pancreatitis, admitted to [facility] for further evaluation.    The patient no longer endorses abdominal pain. He denies fevers/chills. He denies taking any new medications, herbal teas, or herbal supplements. He states he has not had any alcohol for the past 6 weeks.    The patient was noted to have an elevated lipase and CT A/P consistent with pancreatitis.     Allergies:  lactose (Flatulence; Diarrhea)  No Known Allergies    Home Medications:    · 	metoprolol succinate 25 mg oral tablet, extended release: Last Dose Taken:  , 1 tab(s) orally once a day  · 	losartan 100 mg oral tablet: Last Dose Taken:  , 1 tab(s) orally once a day  · 	hydroCHLOROthiazide 25 mg oral tablet: Last Dose Taken:  , 1 tab(s) orally once a day  · 	Cialis 5 mg oral tablet: Last Dose Taken:  , 1 tab(s) orally once a day  · 	Aspirin Enteric Coated 325 mg oral delayed release tablet: Last Dose Taken:  , 1 tab(s) orally once a day    Hospital Medications:  aspirin enteric coated 81 milliGRAM(s) Oral daily  heparin   Injectable 5000 Unit(s) SubCutaneous three times a day  lactated ringers. 1000 milliLiter(s) IV Continuous <Continuous>  metoprolol succinate ER 25 milliGRAM(s) Oral daily  senna 2 Tablet(s) Oral at bedtime    PMHX/PSHX:  BPH (benign prostatic hyperplasia)  Gilbert disease  Fatty liver  Hypertension  H/O breast biopsy    Family history:  FH: breast cancer  No pertinent family history in first degree relatives    Denies family history of colon cancer/polyps, stomach cancer/polyps, pancreatic cancer/masses, liver cancer/disease, ovarian cancer and endometrial cancer.    Social History:     Tob: Denies  EtOH: Denies  Illicit Drugs: Denies    ROS:     General:  No wt loss, fevers, chills, night sweats, fatigue  Eyes:  Good vision, no reported pain  ENT:  No sore throat, pain, runny nose, dysphagia  CV:  No pain, palpitations, hypo/hypertension  Pulm:  No dyspnea, cough, tachypnea, wheezing  GI:  + pain (resolved), No nausea, No vomiting, No diarrhea, No constipation, No weight loss, No fever, No pruritis, No rectal bleeding, No tarry stools, No dysphagia,  :  No pain, bleeding, incontinence, nocturia  Muscle:  No pain, weakness  Neuro:  No weakness, tingling, memory problems  Psych:  No fatigue, insomnia, mood problems, depression  Endocrine:  No polyuria, polydipsia, cold/heat intolerance  Heme:  No petechiae, ecchymosis, easy bruisability  Skin:  No rash, tattoos, scars, edema    PHYSICAL EXAM:     GENERAL:  No acute distress  HEENT:  Normocephalic/atraumatic, + scleral icterus  CHEST:  Normal effort, no accessory muscle use  ABDOMEN:  Soft, non-tender, non-distended, normoactive bowel sounds  EXTREMITIES: No cyanosis, clubbing, or edema  SKIN:  No rash/erythema  NEURO:  Alert and oriented x 3, no asterixis    Vital Signs:  Vital Signs Last 24 Hrs  T(C): 36.8 (18 Apr 2020 08:37), Max: 37.2 (17 Apr 2020 16:53)  T(F): 98.2 (18 Apr 2020 08:37), Max: 98.9 (17 Apr 2020 16:53)  HR: 65 (18 Apr 2020 08:37) (64 - 91)  BP: 115/9 (18 Apr 2020 08:37) (115/9 - 156/83)  BP(mean): 94 (17 Apr 2020 19:15) (94 - 94)  RR: 18 (18 Apr 2020 08:37) (14 - 18)  SpO2: 94% (18 Apr 2020 04:14) (94% - 98%)  Daily Height in cm: 185.42 (17 Apr 2020 16:53)    Daily     LABS:                        11.8   8.45  )-----------( 199      ( 18 Apr 2020 06:53 )             34.3     Mean Cell Volume: 90.3 fl (04-18-20 @ 06:53)    04-18    137  |  99  |  23  ----------------------------<  82  3.1<L>   |  24  |  1.26    Ca    9.0      18 Apr 2020 06:44  Phos  3.3     04-18  Mg     2.3     04-18    TPro  6.3  /  Alb  3.5  /  TBili  10.3<H>  /  DBili  x   /  AST  151<H>  /  ALT  293<H>  /  AlkPhos  158<H>  04-18    LIVER FUNCTIONS - ( 18 Apr 2020 09:27 )  Alb: x     / Pro: x     / ALK PHOS: x     / ALT: x     / AST: x     / GGT: 335 U/L       PT/INR - ( 17 Apr 2020 23:37 )   PT: 13.0 sec;   INR: 1.14 ratio         PTT - ( 17 Apr 2020 23:37 )  PTT:28.3 sec    Amylase Serum--      Lipase [id number]       Ammonia--                          11.8   8.45  )-----------( 199      ( 18 Apr 2020 06:53 )             34.3                         14.0   15.05 )-----------( 229      ( 17 Apr 2020 18:52 )             39.7       Imaging:    < from: CT Abdomen and Pelvis w/ IV Cont (04.17.20 @ 23:11) >    EXAM:  CT ABDOMEN AND PELVIS IC                            PROCEDURE DATE:  04/17/2020            INTERPRETATION:  CLINICAL INFORMATION: Jaundice. Transaminitis. Gilbert's disease.     COMPARISON: MR abdomen 3/13/2020. Abdominal ultrasound 4/17/2020.    PROCEDURE:   CT of the Abdomen and Pelvis was performed with intravenous contrast.   Arterial and Portal Venous phases were acquired.  Intravenous contrast: 90 ml Omnipaque 350. 10 ml discarded.  Oral contrast: Water was administered.  Sagittal and coronal reformats were performed.    FINDINGS:    LOWER CHEST: Minimal bibasilar atelectasis.    LIVER: Left hepatic lobe hypodense focus, too small to characterize. Patent portal and hepatic veins.  BILE DUCTS: Normal caliber.  GALLBLADDER: Mild thickening of the gallbladder wall with nonspecific gallbladder wall edema.  SPLEEN: Borderline enlarged measuring 13.1 cm in craniocaudal dimension.  PANCREAS: Atrophic pancreas with mild peripancreatic fatty infiltration. No pancreatic ductal dilatation. Cystic lesions measuring up to 1.0 cm in the body (2: 38) and 1.3 cm in the uncinate process (2:52), better appreciated on recent MRI abdomen 3/13/2020.  ADRENALS: Within normal limits.  KIDNEYS/URETERS: Left renal cyst. Additional exophytic hypodense focus in the left kidney, too small to characterize. No hydronephrosis.    BLADDER: Within normal limits.  REPRODUCTIVE ORGANS: Normal sized prostate.    BOWEL: No bowel obstruction. Appendix is normal.  PERITONEUM: No drainable fluid collection. Mild haziness of the mesenteric fat with subcentimeter lymph nodes.  VESSELS: Mild atherosclerotic changes of the abdominal aorta. Patent celiac, SMA, bilateral renal arteries and SUSANA. Patent SMV and splenic vein.  RETROPERITONEUM/LYMPH NODES: A 2.2 cm portacaval lymph node (3, 35) unchanged in size since 3/13/2020.   ABDOMINAL WALL: Within normal limits.  BONES: Degenerative changes.    IMPRESSION:     Mild peripancreatic fat infiltration suspicious for pancreatitis.     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 No pancreatic ductal dilatation.    Mild thickening/edema of the gallbladder wall again noted without hyperdense cholelithiasis.                    CHAPARRO TORRES M.D., RADIOLOGY RESIDENT  This document has been electronically signed.  DAVIAN DOMINGUEZ M.D., ATTENDING RADIOLOGIST  This document has been electronically signed. Apr 18 2020  8:33AM                < end of copied text >    < from: US Abdomen Upper Quadrant Right (04.17.20 @ 21:27) >    EXAM:  US ABDOMEN RT UPR QUADRANT                            PROCEDURE DATE:  04/17/2020            INTERPRETATION:  CLINICAL INFORMATION: Jaundice. Abdominal pain.    COMPARISON: Abdominal ultrasound 3/13/2020. Abdominal MR 3/13/2020.    TECHNIQUE: Sonography of the right upper quadrant.     FINDINGS:    Liver: Hepatic steatosis.    Bile ducts: Normal caliber. Common bile duct measures 6 mm.     Gallbladder: Mild thickening of gallbladder wall measuring 6 mm. Sludge in the gallbladder. No pericholecystic fluid. Negative sonographic Jaquez sign. Patient was not premedicated.    Pancreas: Septated cyst measuring 1.3 x 1 x 1.1 cm again noted.    Right kidney: 13 cm. No hydronephrosis. A renal cyst measuring 8 mm.    Ascites: None.    Aorta: Visualized portions are within normal limits.    IMPRESSION:     1.  Sludge in the gallbladder. No cholelithiasis or choledocholithiasis.  2.  Gallbladder wall thickening again noted.  3.  Hepatic steatosis.  4.  Unchanged pancreatic cystic structure which was previously suggested to represent side branch IPMN.                CHAPARRO TORRES M.D., RADIOLOGY RESIDENT  This document has been electronically signed.  AILYN WOLFF M.D., ATTENDING RADIOLOGIST  This document has been electronically signed. Apr17 2020 10:05PM                < end of copied text >

## 2020-04-18 NOTE — H&P ADULT - PROBLEM SELECTOR PLAN 1
- broad differential. check lipids. could be 2/2 biliary sludge or IPMN  - no gallstones on MRCP last time or on ultrasounds  - pt denies etoh use in 6 weeks  - LR  - hold off opiates for now  - advanced diet as tolerated - broad differential. check lipids. could be 2/2 biliary sludge or IPMN  - no gallstones on MRCP last time or on ultrasounds  - f/u CT a/p results  - pt denies etoh use in 6 weeks  - LR  - hold off opiates for now  - advanced diet as tolerated

## 2020-04-18 NOTE — H&P ADULT - PROBLEM SELECTOR PLAN 2
- known gilberts, but recurrent direct bili concerning for obstruction/cholestasis  - ddx includes IPMN vs intrahepatic obstruction vs sludge  - will consult advanced GI by email for poss ERCP/biopsy  - will need to obtain results from pt's labs from 1 week ago, whether bili was normal or still elevated

## 2020-04-18 NOTE — H&P ADULT - NSHPSOCIALHISTORY_GEN_ALL_CORE
Social History:    Marital Status: ( x ) , (  ) Single, (  ) , (  ) , (  )   # of Children: 2 sons  Lives with: (  ) alone, (  ) children, ( x ) spouse, (  ) parents, (  ) siblings, (  ) friends, (  ) other:   Occupation:     Substance Use/Illicit Drugs: (  ) never used vs other:   Tobacco Usage: ( x ) never smoked, (  ) former smoker, (  ) current smoker and Total Pack-Years:   Last Alcohol Usage/Frequency/Amount/Withdrawal/Hx of Abuse:  last drink 6 weeks ago  Foreign travel: [place] 2 months ago  Animal exposure:

## 2020-04-18 NOTE — H&P ADULT - PROBLEM SELECTOR PLAN 4
- GB wall thickening is unchanged from last hospitalization, and without abd pain  - unlikely acute jessy, but poss chronic cholecystitis and may need eventual cholecystectomy  - hold off abx for now, but low threshold for starting empiric abx  - blood cultures

## 2020-04-18 NOTE — CONSULT NOTE ADULT - ATTENDING COMMENTS
Acute pancreatitis  known pancreatitic cystic lesions likely IPMN  obstructive jaundice   elevated CA 19-9    Recommend:  supportive care for acute pancreatitis: IVF, clear liquid diet, advance as tolerated  MRCP for further evaluation of ducts  will likely need EUS +/- ERCP later this week, pls perform COVID swab prior   Case discussed with advanced attending Dr BALJEET Banks

## 2020-04-18 NOTE — PROGRESS NOTE ADULT - SUBJECTIVE AND OBJECTIVE BOX
Patient is a 69y old  Male who presents with a chief complaint of Jaundice (18 Apr 2020 10:11)      SUBJECTIVE / OVERNIGHT EVENTS:    MEDICATIONS  (STANDING):  aspirin enteric coated 81 milliGRAM(s) Oral daily  heparin   Injectable 5000 Unit(s) SubCutaneous three times a day  lactated ringers. 1000 milliLiter(s) (200 mL/Hr) IV Continuous <Continuous>  metoprolol succinate ER 25 milliGRAM(s) Oral daily  potassium chloride    Tablet ER 40 milliEquivalent(s) Oral once  senna 2 Tablet(s) Oral at bedtime    MEDICATIONS  (PRN):      Vital Signs Last 24 Hrs  T(C): 36.8 (18 Apr 2020 08:37), Max: 37.2 (17 Apr 2020 16:53)  T(F): 98.2 (18 Apr 2020 08:37), Max: 98.9 (17 Apr 2020 16:53)  HR: 65 (18 Apr 2020 08:37) (64 - 91)  BP: 115/79 (18 Apr 2020 08:37) (115/79 - 156/83)  BP(mean): 94 (17 Apr 2020 19:15) (94 - 94)  RR: 18 (18 Apr 2020 08:37) (14 - 18)  SpO2: 96% (18 Apr 2020 08:37) (94% - 98%)  CAPILLARY BLOOD GLUCOSE        I&O's Summary    17 Apr 2020 07:01  -  18 Apr 2020 07:00  --------------------------------------------------------  IN: 360 mL / OUT: 0 mL / NET: 360 mL    18 Apr 2020 07:01  -  18 Apr 2020 11:37  --------------------------------------------------------  IN: 320 mL / OUT: 0 mL / NET: 320 mL        PHYSICAL EXAM:  GENERAL: NAD, well-developed  HEAD:  Atraumatic, Normocephalic  EYES: EOMI, PERRLA, conjunctiva and sclera clear  NECK: Supple, No JVD  CHEST/LUNG: Clear to auscultation bilaterally; No wheeze  HEART: Regular rate and rhythm; No murmurs, rubs, or gallops  ABDOMEN: Soft, Nontender, Nondistended; Bowel sounds present  EXTREMITIES:  2+ Peripheral Pulses, No clubbing, cyanosis, or edema  PSYCH: AAOx3  NEUROLOGY: non-focal  SKIN: No rashes or lesions    LABS:                        11.8   8.45  )-----------( 199      ( 18 Apr 2020 06:53 )             34.3     04-18    137  |  99  |  23  ----------------------------<  82  3.1<L>   |  24  |  1.26    Ca    9.0      18 Apr 2020 06:44  Phos  3.3     04-18  Mg     2.3     04-18    TPro  6.3  /  Alb  3.5  /  TBili  10.3<H>  /  DBili  x   /  AST  151<H>  /  ALT  293<H>  /  AlkPhos  158<H>  04-18    PT/INR - ( 17 Apr 2020 23:37 )   PT: 13.0 sec;   INR: 1.14 ratio         PTT - ( 17 Apr 2020 23:37 )  PTT:28.3 sec          RADIOLOGY & ADDITIONAL TESTS:    Imaging Personally Reviewed:    Consultant(s) Notes Reviewed:      Care Discussed with Consultants/Other Providers: Patient is a 69y old  Male who presents with a chief complaint of Jaundice (18 Apr 2020 10:11)      SUBJECTIVE / OVERNIGHT EVENTS:  Pt seen and examined. No acute events overnight. He denies abd pain. Tolerating Low fat diet. Seen by GI ; reccs appreciated.    MEDICATIONS  (STANDING):  aspirin enteric coated 81 milliGRAM(s) Oral daily  heparin   Injectable 5000 Unit(s) SubCutaneous three times a day  lactated ringers. 1000 milliLiter(s) (200 mL/Hr) IV Continuous <Continuous>  metoprolol succinate ER 25 milliGRAM(s) Oral daily  potassium chloride    Tablet ER 40 milliEquivalent(s) Oral once  senna 2 Tablet(s) Oral at bedtime    MEDICATIONS  (PRN):      Vital Signs Last 24 Hrs  T(C): 36.8 (18 Apr 2020 08:37), Max: 37.2 (17 Apr 2020 16:53)  T(F): 98.2 (18 Apr 2020 08:37), Max: 98.9 (17 Apr 2020 16:53)  HR: 65 (18 Apr 2020 08:37) (64 - 91)  BP: 115/79 (18 Apr 2020 08:37) (115/79 - 156/83)  BP(mean): 94 (17 Apr 2020 19:15) (94 - 94)  RR: 18 (18 Apr 2020 08:37) (14 - 18)  SpO2: 96% (18 Apr 2020 08:37) (94% - 98%)  CAPILLARY BLOOD GLUCOSE        I&O's Summary    17 Apr 2020 07:01  -  18 Apr 2020 07:00  --------------------------------------------------------  IN: 360 mL / OUT: 0 mL / NET: 360 mL    18 Apr 2020 07:01  -  18 Apr 2020 11:37  --------------------------------------------------------  IN: 320 mL / OUT: 0 mL / NET: 320 mL        PHYSICAL EXAM:  GENERAL: NAD, scleral icteruc ++  CHEST/LUNG: Clear to auscultation bilaterally; No wheeze  HEART: Regular rate and rhythm; No murmurs, rubs, or gallops  ABDOMEN: Soft, Nontender, Nondistended; Bowel sounds present  EXTREMITIES:  2+ Peripheral Pulses, No clubbing, cyanosis, or edema  PSYCH: AAOx3  NEUROLOGY: non-focal  SKIN: jaundice++    LABS:                        11.8   8.45  )-----------( 199      ( 18 Apr 2020 06:53 )             34.3     04-18    137  |  99  |  23  ----------------------------<  82  3.1<L>   |  24  |  1.26    Ca    9.0      18 Apr 2020 06:44  Phos  3.3     04-18  Mg     2.3     04-18    TPro  6.3  /  Alb  3.5  /  TBili  10.3<H>  /  DBili  x   /  AST  151<H>  /  ALT  293<H>  /  AlkPhos  158<H>  04-18    PT/INR - ( 17 Apr 2020 23:37 )   PT: 13.0 sec;   INR: 1.14 ratio         PTT - ( 17 Apr 2020 23:37 )  PTT:28.3 sec          Consultant(s) Notes Reviewed:  GI

## 2020-04-18 NOTE — H&P ADULT - NSHPREVIEWOFSYSTEMS_GEN_ALL_CORE
REVIEW OF SYSTEMS:  CONSTITUTIONAL: No fevers. +chills. No rigors. No weight loss. +night sweats. +poor appetite.  EYES: No visual changes. No eye pain.  ENT: No hearing difficulty. No vertigo. No dysphagia. No sore throat. No Sinusitis/rhinorrhea.   NECK: No pain. No stiffness/rigidity.  CARDIAC: No chest pain. No palpitations. No lightheadedness.  RESPIRATORY: No cough. No SOB. No hemoptysis.  GASTROINTESTINAL: +abdominal pain. +nausea. No vomiting. No hematemesis. No diarrhea. +constipation. No melena. No hematochezia.  GENITOURINARY: No dysuria. No frequency. No hesitancy. No hematuria. No oliguria.  NEUROLOGICAL: No numbness/tingling. No focal weakness. No urinary or fecal incontinence. No headache. No unsteady gait.  BACK: No back pain. No flank pain.  EXTREMITIES: No lower extremity edema. Full ROM. No joint pain.  SKIN: No rashes. No itching. No other lesions.  PSYCHIATRIC: No depression. No anxiety. No SI/HI.  ALLERGIC: No lip swelling. No hives.  All other review of systems is negative unless indicated above.  Unless indicated above, unable to assess ROS 2/2

## 2020-04-18 NOTE — CONSULT NOTE ADULT - ASSESSMENT
Impression:    70 y/o M w/ hx of HTN, Gilbert's syndrome, hepatic steatosis, and pancreatic cysts likely representing IPMN based on MRI read, with recent hospitalization for elevated bilirubin in setting of gastroenteritis, with negative viral hepatitis and chronic liver disease work-up, presenting with abdominal pain x 4 days (now resolved) and found to be jaundiced at an urgent care, diagnosed with acute pancreatitis, admitted to [facility] for further evaluation.    # Concern for biliary obstruction: Patient with abdominal pain and found to have significant elevated bilirubin, with sludge noted in gallbladder, however, with bile ducts normal caliber on CT A/P and abdominal U/S. May represent transient obstruction due to sludge vs passed stone that has since resolved as the patient no longer complains of abdominal pain, is tolerating a regular diet, and liver enzymes are downtrending.   # Abnormal liver enzymes: Concern for possible transient biliary obstruction. Prior work-up in 3/20 negative for chronic liver disease and viral hepatitides.   # Pancreatic lesions: Likely represents IPMN. Differential includes mucinous cyst and serous cyst adenoma.  # Acute pancreatitis: Possibly secondary to transient biliary obstruction. Differential includes hypertriglyceridemia and autoimmune pancreatitis.  # Hepatic steatosis  # Gilbert's syndrome    Recommendations:  - Will discuss need for EUS/ERCP with advanced GI team  - Continue aggressive IV hydration, LR @ 200 ml/hr  - Diet as tolerated  - F/U lipid panel  - PRN pain control per primary team  - Rest of care per primary team Impression:    68 y/o M w/ hx of HTN, Gilbert's syndrome, hepatic steatosis, and pancreatic cysts likely representing IPMN based on MRI read, with recent hospitalization for elevated bilirubin in setting of gastroenteritis, with negative viral hepatitis and chronic liver disease work-up, presenting with abdominal pain x 4 days (now resolved) and found to be jaundiced at an urgent care, diagnosed with acute pancreatitis, admitted to [facility] for further evaluation.    # Concern for biliary obstruction: Patient with abdominal pain and found to have significant elevated bilirubin, with sludge noted in gallbladder, however, with bile ducts normal caliber on CT A/P and abdominal U/S. May represent transient obstruction due to sludge vs passed stone that has since resolved as the patient no longer complains of abdominal pain, is tolerating a regular diet, and liver enzymes are downtrending.   # Abnormal liver enzymes: Concern for possible transient biliary obstruction. Prior work-up in 3/20 negative for chronic liver disease and viral hepatitides.   # Pancreatic lesions: Likely represents IPMN. Differential includes mucinous cyst and serous cyst adenoma.  # Acute pancreatitis: Possibly secondary to transient biliary obstruction. Differential includes hypertriglyceridemia and autoimmune pancreatitis.  # Hepatic steatosis  # Gilbert's syndrome    Recommendations:  - Please repeat MRCP   - Continue aggressive IV hydration, LR @ 200 ml/hr  - Diet as tolerated  - F/U lipid panel  - PRN pain control per primary team  - Rest of care per primary team  -If patient is to undergo EUS/ERCP (later this week), please obtain COVID swab prior for risk stratification

## 2020-04-18 NOTE — H&P ADULT - ASSESSMENT
69M c hx gilbert's disease, HTN, DVT on ASA only, poss IPMN, hepatic steatosis, recent hospitalization for hyperbilirubinemia/RHINA/thickened GB of unclear etiol (Mar '20), pw pancreatitis, RHINA, transaminitis, leukocytosis of unclear etiol

## 2020-04-18 NOTE — H&P ADULT - NSHPPHYSICALEXAM_GEN_ALL_CORE
PHYSICAL EXAM:   GENERAL: Alert. Not confused. No acute distress. Not thin. Not cachectic. Not obese.  HEAD:  Atraumatic. Normocephalic.  EYES: EOMI. PERRLA. mild scleral jaundice  ENT: Neck supple. No JVD. Moist oral mucosa. Not edentulous. No thrush.  LYMPH: Normal supraclavicular/cervical lymph nodes.   CARDIAC: Not tachy, Not lianne. Regular rhythm. Not irregularly irregular. S1. S2. No murmur. No rub. No distant heart sounds.  LUNG/CHEST: CTAB. BS equal bilaterally. No wheezes. No rales. No rhonchi.  ABDOMEN: Soft. No tenderness. No distension. No fluid wave. Normal bowel sounds.  BACK: No midline/vertebral tenderness. No flank tenderness.  VASCULAR: +2 b/l radial or ulnar pulses. Palpable DP pulses.  EXTREMITIES:  No clubbing. No cyanosis. No edema. Moving all 4.  NEUROLOGY: A&Ox3. Non-focal exam. Cranial nerves intact. Normal speech. Sensation intact.  PSYCH: Normal behavior. Normal affect.  SKIN: No jaundice. No erythema. No rash/lesion.  Vascular Access:     ICU Vital Signs Last 24 Hrs  T(C): 36.2 (18 Apr 2020 00:35), Max: 37.2 (17 Apr 2020 16:53)  T(F): 97.2 (18 Apr 2020 00:35), Max: 98.9 (17 Apr 2020 16:53)  HR: 66 (18 Apr 2020 00:35) (66 - 91)  BP: 156/83 (18 Apr 2020 00:35) (135/76 - 156/83)  BP(mean): 94 (17 Apr 2020 19:15) (94 - 94)  ABP: --  ABP(mean): --  RR: 14 (18 Apr 2020 00:35) (14 - 18)  SpO2: 97% (18 Apr 2020 00:35) (97% - 98%)      I&O's Summary

## 2020-04-19 LAB
ALBUMIN SERPL ELPH-MCNC: 3.2 G/DL — LOW (ref 3.3–5)
ALP SERPL-CCNC: 152 U/L — HIGH (ref 40–120)
ALT FLD-CCNC: 224 U/L — HIGH (ref 10–45)
ANION GAP SERPL CALC-SCNC: 12 MMOL/L — SIGNIFICANT CHANGE UP (ref 5–17)
AST SERPL-CCNC: 99 U/L — HIGH (ref 10–40)
BILIRUB SERPL-MCNC: 5.7 MG/DL — HIGH (ref 0.2–1.2)
BUN SERPL-MCNC: 18 MG/DL — SIGNIFICANT CHANGE UP (ref 7–23)
CALCIUM SERPL-MCNC: 8.7 MG/DL — SIGNIFICANT CHANGE UP (ref 8.4–10.5)
CHLORIDE SERPL-SCNC: 103 MMOL/L — SIGNIFICANT CHANGE UP (ref 96–108)
CHOLEST SERPL-MCNC: 149 MG/DL — SIGNIFICANT CHANGE UP (ref 10–199)
CO2 SERPL-SCNC: 24 MMOL/L — SIGNIFICANT CHANGE UP (ref 22–31)
CREAT SERPL-MCNC: 0.95 MG/DL — SIGNIFICANT CHANGE UP (ref 0.5–1.3)
GLUCOSE SERPL-MCNC: 91 MG/DL — SIGNIFICANT CHANGE UP (ref 70–99)
HCT VFR BLD CALC: 34.5 % — LOW (ref 39–50)
HDLC SERPL-MCNC: 29 MG/DL — LOW
HGB BLD-MCNC: 11.8 G/DL — LOW (ref 13–17)
LIPID PNL WITH DIRECT LDL SERPL: 92 MG/DL — SIGNIFICANT CHANGE UP
MCHC RBC-ENTMCNC: 31 PG — SIGNIFICANT CHANGE UP (ref 27–34)
MCHC RBC-ENTMCNC: 34.2 GM/DL — SIGNIFICANT CHANGE UP (ref 32–36)
MCV RBC AUTO: 90.6 FL — SIGNIFICANT CHANGE UP (ref 80–100)
NRBC # BLD: 0 /100 WBCS — SIGNIFICANT CHANGE UP (ref 0–0)
PLATELET # BLD AUTO: 218 K/UL — SIGNIFICANT CHANGE UP (ref 150–400)
POTASSIUM SERPL-MCNC: 3.4 MMOL/L — LOW (ref 3.5–5.3)
POTASSIUM SERPL-SCNC: 3.4 MMOL/L — LOW (ref 3.5–5.3)
PROT SERPL-MCNC: 6.2 G/DL — SIGNIFICANT CHANGE UP (ref 6–8.3)
RBC # BLD: 3.81 M/UL — LOW (ref 4.2–5.8)
RBC # FLD: 14 % — SIGNIFICANT CHANGE UP (ref 10.3–14.5)
SODIUM SERPL-SCNC: 139 MMOL/L — SIGNIFICANT CHANGE UP (ref 135–145)
TOTAL CHOLESTEROL/HDL RATIO MEASUREMENT: 5.1 RATIO — SIGNIFICANT CHANGE UP (ref 3.4–9.6)
TRIGL SERPL-MCNC: 138 MG/DL — SIGNIFICANT CHANGE UP (ref 10–149)
WBC # BLD: 7.03 K/UL — SIGNIFICANT CHANGE UP (ref 3.8–10.5)
WBC # FLD AUTO: 7.03 K/UL — SIGNIFICANT CHANGE UP (ref 3.8–10.5)

## 2020-04-19 PROCEDURE — 99233 SBSQ HOSP IP/OBS HIGH 50: CPT

## 2020-04-19 PROCEDURE — 99221 1ST HOSP IP/OBS SF/LOW 40: CPT | Mod: GC

## 2020-04-19 PROCEDURE — 74183 MRI ABD W/O CNTR FLWD CNTR: CPT | Mod: 26

## 2020-04-19 RX ORDER — POTASSIUM CHLORIDE 20 MEQ
40 PACKET (EA) ORAL ONCE
Refills: 0 | Status: COMPLETED | OUTPATIENT
Start: 2020-04-19 | End: 2020-04-19

## 2020-04-19 RX ADMIN — HEPARIN SODIUM 5000 UNIT(S): 5000 INJECTION INTRAVENOUS; SUBCUTANEOUS at 23:41

## 2020-04-19 RX ADMIN — HEPARIN SODIUM 5000 UNIT(S): 5000 INJECTION INTRAVENOUS; SUBCUTANEOUS at 06:42

## 2020-04-19 RX ADMIN — SODIUM CHLORIDE 200 MILLILITER(S): 9 INJECTION, SOLUTION INTRAVENOUS at 12:33

## 2020-04-19 RX ADMIN — Medication 81 MILLIGRAM(S): at 12:33

## 2020-04-19 RX ADMIN — HEPARIN SODIUM 5000 UNIT(S): 5000 INJECTION INTRAVENOUS; SUBCUTANEOUS at 12:33

## 2020-04-19 RX ADMIN — Medication 40 MILLIEQUIVALENT(S): at 16:42

## 2020-04-19 RX ADMIN — Medication 25 MILLIGRAM(S): at 06:42

## 2020-04-19 NOTE — PROGRESS NOTE ADULT - SUBJECTIVE AND OBJECTIVE BOX
Patient is a 69y old  Male who presents with a chief complaint of jaundice, abd pain (18 Apr 2020 11:36)      SUBJECTIVE / OVERNIGHT EVENTS:    MEDICATIONS  (STANDING):  aspirin enteric coated 81 milliGRAM(s) Oral daily  heparin   Injectable 5000 Unit(s) SubCutaneous three times a day  lactated ringers. 1000 milliLiter(s) (200 mL/Hr) IV Continuous <Continuous>  metoprolol succinate ER 25 milliGRAM(s) Oral daily  senna 2 Tablet(s) Oral at bedtime    MEDICATIONS  (PRN):      Vital Signs Last 24 Hrs  T(C): 36.8 (19 Apr 2020 06:40), Max: 36.9 (19 Apr 2020 00:51)  T(F): 98.2 (19 Apr 2020 06:40), Max: 98.5 (19 Apr 2020 00:51)  HR: 64 (19 Apr 2020 06:40) (61 - 68)  BP: 139/80 (19 Apr 2020 06:40) (123/69 - 139/80)  BP(mean): --  RR: 18 (19 Apr 2020 06:40) (18 - 18)  SpO2: 96% (19 Apr 2020 06:40) (96% - 98%)  CAPILLARY BLOOD GLUCOSE        I&O's Summary    18 Apr 2020 07:01  -  19 Apr 2020 07:00  --------------------------------------------------------  IN: 2860 mL / OUT: 0 mL / NET: 2860 mL        PHYSICAL EXAM:  GENERAL: NAD, well-developed  HEAD:  Atraumatic, Normocephalic  EYES: EOMI, PERRLA, conjunctiva and sclera clear  NECK: Supple, No JVD  CHEST/LUNG: Clear to auscultation bilaterally; No wheeze  HEART: Regular rate and rhythm; No murmurs, rubs, or gallops  ABDOMEN: Soft, Nontender, Nondistended; Bowel sounds present  EXTREMITIES:  2+ Peripheral Pulses, No clubbing, cyanosis, or edema  PSYCH: AAOx3  NEUROLOGY: non-focal  SKIN: No rashes or lesions    LABS:                        11.8   7.03  )-----------( 218      ( 19 Apr 2020 06:17 )             34.5     04-19    139  |  103  |  18  ----------------------------<  91  3.4<L>   |  24  |  0.95    Ca    8.7      19 Apr 2020 06:17  Phos  3.3     04-18  Mg     2.3     04-18    TPro  6.2  /  Alb  3.2<L>  /  TBili  5.7<H>  /  DBili  x   /  AST  99<H>  /  ALT  224<H>  /  AlkPhos  152<H>  04-19    PT/INR - ( 17 Apr 2020 23:37 )   PT: 13.0 sec;   INR: 1.14 ratio         PTT - ( 17 Apr 2020 23:37 )  PTT:28.3 sec          RADIOLOGY & ADDITIONAL TESTS:    Imaging Personally Reviewed:    Consultant(s) Notes Reviewed:      Care Discussed with Consultants/Other Providers: Patient is a 69y old  Male who presents with a chief complaint of jaundice, abd pain (18 Apr 2020 11:36)      SUBJECTIVE / OVERNIGHT EVENTS: Pt seen and examined. No acute events overnight. Denies fever/chills, abd pain.     MEDICATIONS  (STANDING):  aspirin enteric coated 81 milliGRAM(s) Oral daily  heparin   Injectable 5000 Unit(s) SubCutaneous three times a day  lactated ringers. 1000 milliLiter(s) (200 mL/Hr) IV Continuous <Continuous>  metoprolol succinate ER 25 milliGRAM(s) Oral daily  senna 2 Tablet(s) Oral at bedtime    MEDICATIONS  (PRN):      Vital Signs Last 24 Hrs  T(C): 36.8 (19 Apr 2020 06:40), Max: 36.9 (19 Apr 2020 00:51)  T(F): 98.2 (19 Apr 2020 06:40), Max: 98.5 (19 Apr 2020 00:51)  HR: 64 (19 Apr 2020 06:40) (61 - 68)  BP: 139/80 (19 Apr 2020 06:40) (123/69 - 139/80)  BP(mean): --  RR: 18 (19 Apr 2020 06:40) (18 - 18)  SpO2: 96% (19 Apr 2020 06:40) (96% - 98%)  CAPILLARY BLOOD GLUCOSE        I&O's Summary    18 Apr 2020 07:01  -  19 Apr 2020 07:00  --------------------------------------------------------  IN: 2860 mL / OUT: 0 mL / NET: 2860 mL        PHYSICAL EXAM:  GENERAL: NAD, scleral icterus ++  CHEST/LUNG: Clear to auscultation bilaterally; No wheeze  HEART: Regular rate and rhythm; No murmurs, rubs, or gallops  ABDOMEN: Soft, Nontender, Nondistended; Bowel sounds present  EXTREMITIES:  2+ Peripheral Pulses, No clubbing, cyanosis, or edema  PSYCH: AAOx3  NEUROLOGY: non-focal  SKIN: jaundice++    LABS:                        11.8   7.03  )-----------( 218      ( 19 Apr 2020 06:17 )             34.5     04-19    139  |  103  |  18  ----------------------------<  91  3.4<L>   |  24  |  0.95    Ca    8.7      19 Apr 2020 06:17  Phos  3.3     04-18  Mg     2.3     04-18    TPro  6.2  /  Alb  3.2<L>  /  TBili  5.7<H>  /  DBili  x   /  AST  99<H>  /  ALT  224<H>  /  AlkPhos  152<H>  04-19    PT/INR - ( 17 Apr 2020 23:37 )   PT: 13.0 sec;   INR: 1.14 ratio         PTT - ( 17 Apr 2020 23:37 )  PTT:28.3 sec      Culture - Blood (04.18.20 @ 10:07)    Specimen Source: .Blood Blood-Venous    Culture Results:   No growth to date.

## 2020-04-20 LAB
ALBUMIN SERPL ELPH-MCNC: 3.2 G/DL — LOW (ref 3.3–5)
ALP SERPL-CCNC: 131 U/L — HIGH (ref 40–120)
ALT FLD-CCNC: 176 U/L — HIGH (ref 10–45)
ANION GAP SERPL CALC-SCNC: 11 MMOL/L — SIGNIFICANT CHANGE UP (ref 5–17)
AST SERPL-CCNC: 76 U/L — HIGH (ref 10–40)
BILIRUB SERPL-MCNC: 3.9 MG/DL — HIGH (ref 0.2–1.2)
BUN SERPL-MCNC: 16 MG/DL — SIGNIFICANT CHANGE UP (ref 7–23)
CALCIUM SERPL-MCNC: 8.6 MG/DL — SIGNIFICANT CHANGE UP (ref 8.4–10.5)
CHLORIDE SERPL-SCNC: 105 MMOL/L — SIGNIFICANT CHANGE UP (ref 96–108)
CO2 SERPL-SCNC: 26 MMOL/L — SIGNIFICANT CHANGE UP (ref 22–31)
CREAT SERPL-MCNC: 0.97 MG/DL — SIGNIFICANT CHANGE UP (ref 0.5–1.3)
GLUCOSE SERPL-MCNC: 96 MG/DL — SIGNIFICANT CHANGE UP (ref 70–99)
HCT VFR BLD CALC: 33.5 % — LOW (ref 39–50)
HGB BLD-MCNC: 11 G/DL — LOW (ref 13–17)
LIDOCAIN IGE QN: 157 U/L — HIGH (ref 7–60)
MCHC RBC-ENTMCNC: 30.7 PG — SIGNIFICANT CHANGE UP (ref 27–34)
MCHC RBC-ENTMCNC: 32.8 GM/DL — SIGNIFICANT CHANGE UP (ref 32–36)
MCV RBC AUTO: 93.6 FL — SIGNIFICANT CHANGE UP (ref 80–100)
NRBC # BLD: 0 /100 WBCS — SIGNIFICANT CHANGE UP (ref 0–0)
PLATELET # BLD AUTO: 196 K/UL — SIGNIFICANT CHANGE UP (ref 150–400)
POTASSIUM SERPL-MCNC: 3.9 MMOL/L — SIGNIFICANT CHANGE UP (ref 3.5–5.3)
POTASSIUM SERPL-SCNC: 3.9 MMOL/L — SIGNIFICANT CHANGE UP (ref 3.5–5.3)
PROT SERPL-MCNC: 6 G/DL — SIGNIFICANT CHANGE UP (ref 6–8.3)
RBC # BLD: 3.58 M/UL — LOW (ref 4.2–5.8)
RBC # FLD: 14.3 % — SIGNIFICANT CHANGE UP (ref 10.3–14.5)
SARS-COV-2 RNA SPEC QL NAA+PROBE: SIGNIFICANT CHANGE UP
SODIUM SERPL-SCNC: 142 MMOL/L — SIGNIFICANT CHANGE UP (ref 135–145)
WBC # BLD: 4.89 K/UL — SIGNIFICANT CHANGE UP (ref 3.8–10.5)
WBC # FLD AUTO: 4.89 K/UL — SIGNIFICANT CHANGE UP (ref 3.8–10.5)

## 2020-04-20 PROCEDURE — 99232 SBSQ HOSP IP/OBS MODERATE 35: CPT | Mod: GC

## 2020-04-20 PROCEDURE — 99233 SBSQ HOSP IP/OBS HIGH 50: CPT

## 2020-04-20 RX ADMIN — Medication 25 MILLIGRAM(S): at 13:04

## 2020-04-20 RX ADMIN — HEPARIN SODIUM 5000 UNIT(S): 5000 INJECTION INTRAVENOUS; SUBCUTANEOUS at 21:17

## 2020-04-20 RX ADMIN — Medication 81 MILLIGRAM(S): at 13:03

## 2020-04-20 RX ADMIN — HEPARIN SODIUM 5000 UNIT(S): 5000 INJECTION INTRAVENOUS; SUBCUTANEOUS at 13:03

## 2020-04-20 NOTE — PROVIDER CONTACT NOTE (OTHER) - SITUATION
pt with HR less than 60. HR = 48-51 bpm.  Pt A & O x 4, no s/s dizziness, lethargy, able to ambulate to BR with standby assist.

## 2020-04-20 NOTE — PROGRESS NOTE ADULT - SUBJECTIVE AND OBJECTIVE BOX
Chief Complaint: Abdominal pain  Reason for consult: Jaundice, c/f biliary obstruction    Interval Events:   - Underwent MRI/MRCP overnight - read pending    Allergies:  lactose (Flatulence; Diarrhea)  No Known Allergies    Hospital Medications:  aspirin enteric coated 81 milliGRAM(s) Oral daily  heparin   Injectable 5000 Unit(s) SubCutaneous three times a day  metoprolol succinate ER 25 milliGRAM(s) Oral daily  senna 2 Tablet(s) Oral at bedtime    PMHX/PSHX:  BPH (benign prostatic hyperplasia)  Gilbert disease  Fatty liver  Hypertension  No pertinent past medical history  No significant past surgical history  H/O breast biopsy    Family history:  FH: breast cancer    ROS:     General:  No wt loss, fevers, chills, night sweats, fatigue,   Eyes:  Good vision, no reported pain  ENT:  No sore throat, pain, runny nose, dysphagia  CV:  No pain, palpitations, hypo/hypertension  Pulm:  No dyspnea, cough, tachypnea, wheezing  GI:  No pain, No nausea, No vomiting, No diarrhea, No constipation, No weight loss, No fever, No pruritis, No rectal bleeding, No tarry stools, No dysphagia  :  No pain, bleeding, incontinence, nocturia  Muscle:  No pain, weakness  Neuro:  No weakness, tingling, memory problems  Psych:  No fatigue, insomnia, mood problems, depression  Endocrine:  No polyuria, polydipsia, cold/heat intolerance  Heme:  No petechiae, ecchymosis, easy bruisability  Skin:  No rash, tattoos, scars, edema    PHYSICAL EXAM:   Vital Signs:  Vital Signs Last 24 Hrs  T(C): 36.6 (20 Apr 2020 05:27), Max: 36.8 (19 Apr 2020 20:43)  T(F): 97.9 (20 Apr 2020 05:27), Max: 98.2 (19 Apr 2020 20:43)  HR: 50 (20 Apr 2020 06:40) (48 - 66)  BP: 130/74 (20 Apr 2020 05:27) (114/64 - 145/75)  BP(mean): --  RR: 18 (20 Apr 2020 05:27) (16 - 18)  SpO2: 97% (20 Apr 2020 05:27) (95% - 98%)     GENERAL:  No acute distress  HEENT:  Normocephalic/atraumatic, + scleral icterus  CHEST:  Normal effort, no accessory muscle use  ABDOMEN:  Soft, non-tender, non-distended, normoactive bowel sounds  EXTREMITIES: No cyanosis, clubbing, or edema  SKIN:  No rash/erythema, + jaundice  NEURO:  Alert and oriented x 3, no asterixis    LABS:                        11.0   4.89  )-----------( 196      ( 20 Apr 2020 06:42 )             33.5     Mean Cell Volume: 93.6 fl (04-20-20 @ 06:42)    04-19    139  |  103  |  18  ----------------------------<  91  3.4<L>   |  24  |  0.95    Ca    8.7      19 Apr 2020 06:17    TPro  6.2  /  Alb  3.2<L>  /  TBili  5.7<H>  /  DBili  x   /  AST  99<H>  /  ALT  224<H>  /  AlkPhos  152<H>  04-19    LIVER FUNCTIONS - ( 19 Apr 2020 06:17 )  Alb: 3.2 g/dL / Pro: 6.2 g/dL / ALK PHOS: 152 U/L / ALT: 224 U/L / AST: 99 U/L / GGT: x                                       11.0   4.89  )-----------( 196      ( 20 Apr 2020 06:42 )             33.5                         11.8   7.03  )-----------( 218      ( 19 Apr 2020 06:17 )             34.5                         11.8   8.45  )-----------( 199      ( 18 Apr 2020 06:53 )             34.3                         14.0   15.05 )-----------( 229      ( 17 Apr 2020 18:52 )             39.7       Imaging:    MRI/MRCP read pending

## 2020-04-20 NOTE — PROGRESS NOTE ADULT - SUBJECTIVE AND OBJECTIVE BOX
Patient is a 69y old  Male who presents with a chief complaint of Jaundice, abdominal pain (20 Apr 2020 06:55)      SUBJECTIVE / OVERNIGHT EVENTS: feels better, abdominal pain better, no cp, sob, no n/v    MEDICATIONS  (STANDING):  aspirin enteric coated 81 milliGRAM(s) Oral daily  heparin   Injectable 5000 Unit(s) SubCutaneous three times a day  metoprolol succinate ER 25 milliGRAM(s) Oral daily  senna 2 Tablet(s) Oral at bedtime    MEDICATIONS  (PRN):        CAPILLARY BLOOD GLUCOSE        I&O's Summary    19 Apr 2020 07:01  -  20 Apr 2020 07:00  --------------------------------------------------------  IN: 1140 mL / OUT: 0 mL / NET: 1140 mL    20 Apr 2020 07:01  -  20 Apr 2020 13:24  --------------------------------------------------------  IN: 240 mL / OUT: 0 mL / NET: 240 mL        PHYSICAL EXAM:  GENERAL: NAD, well-developed  HEAD:  Atraumatic, Normocephalic  EYES:  conjunctiva and sclera clear  NECK: No JVD  CHEST/LUNG: Clear to auscultation bilaterally; No wheeze  HEART: Regular rate and rhythm; S1S2  ABDOMEN: Soft, Nontender, Nondistended; Bowel sounds present  EXTREMITIES:  No clubbing, cyanosis, or edema  PSYCH: AAOx3  NEUROLOGY: non-focal      LABS:                        11.0   4.89  )-----------( 196      ( 20 Apr 2020 06:42 )             33.5     04-20    142  |  105  |  16  ----------------------------<  96  3.9   |  26  |  0.97    Ca    8.6      20 Apr 2020 06:35    TPro  6.0  /  Alb  3.2<L>  /  TBili  3.9<H>  /  DBili  x   /  AST  76<H>  /  ALT  176<H>  /  AlkPhos  131<H>  04-20              RADIOLOGY & ADDITIONAL TESTS:    Imaging Personally Reviewed:    Consultant(s) Notes Reviewed:      Care Discussed with Consultants/Other Providers:

## 2020-04-21 LAB
ALBUMIN SERPL ELPH-MCNC: 3.4 G/DL — SIGNIFICANT CHANGE UP (ref 3.3–5)
ALP SERPL-CCNC: 128 U/L — HIGH (ref 40–120)
ALT FLD-CCNC: 187 U/L — HIGH (ref 10–45)
ANION GAP SERPL CALC-SCNC: 10 MMOL/L — SIGNIFICANT CHANGE UP (ref 5–17)
AST SERPL-CCNC: 95 U/L — HIGH (ref 10–40)
BILIRUB SERPL-MCNC: 3.5 MG/DL — HIGH (ref 0.2–1.2)
BUN SERPL-MCNC: 13 MG/DL — SIGNIFICANT CHANGE UP (ref 7–23)
CALCIUM SERPL-MCNC: 8.7 MG/DL — SIGNIFICANT CHANGE UP (ref 8.4–10.5)
CHLORIDE SERPL-SCNC: 105 MMOL/L — SIGNIFICANT CHANGE UP (ref 96–108)
CO2 SERPL-SCNC: 27 MMOL/L — SIGNIFICANT CHANGE UP (ref 22–31)
CREAT SERPL-MCNC: 0.92 MG/DL — SIGNIFICANT CHANGE UP (ref 0.5–1.3)
GLUCOSE SERPL-MCNC: 102 MG/DL — HIGH (ref 70–99)
HCT VFR BLD CALC: 33.9 % — LOW (ref 39–50)
HGB BLD-MCNC: 11.6 G/DL — LOW (ref 13–17)
MCHC RBC-ENTMCNC: 31.4 PG — SIGNIFICANT CHANGE UP (ref 27–34)
MCHC RBC-ENTMCNC: 34.2 GM/DL — SIGNIFICANT CHANGE UP (ref 32–36)
MCV RBC AUTO: 91.6 FL — SIGNIFICANT CHANGE UP (ref 80–100)
NRBC # BLD: 0 /100 WBCS — SIGNIFICANT CHANGE UP (ref 0–0)
PLATELET # BLD AUTO: 187 K/UL — SIGNIFICANT CHANGE UP (ref 150–400)
POTASSIUM SERPL-MCNC: 4.2 MMOL/L — SIGNIFICANT CHANGE UP (ref 3.5–5.3)
POTASSIUM SERPL-SCNC: 4.2 MMOL/L — SIGNIFICANT CHANGE UP (ref 3.5–5.3)
PROT SERPL-MCNC: 6.3 G/DL — SIGNIFICANT CHANGE UP (ref 6–8.3)
RBC # BLD: 3.7 M/UL — LOW (ref 4.2–5.8)
RBC # FLD: 13.9 % — SIGNIFICANT CHANGE UP (ref 10.3–14.5)
SODIUM SERPL-SCNC: 142 MMOL/L — SIGNIFICANT CHANGE UP (ref 135–145)
WBC # BLD: 4.65 K/UL — SIGNIFICANT CHANGE UP (ref 3.8–10.5)
WBC # FLD AUTO: 4.65 K/UL — SIGNIFICANT CHANGE UP (ref 3.8–10.5)

## 2020-04-21 PROCEDURE — 99232 SBSQ HOSP IP/OBS MODERATE 35: CPT | Mod: GC

## 2020-04-21 PROCEDURE — 99233 SBSQ HOSP IP/OBS HIGH 50: CPT

## 2020-04-21 RX ADMIN — HEPARIN SODIUM 5000 UNIT(S): 5000 INJECTION INTRAVENOUS; SUBCUTANEOUS at 20:47

## 2020-04-21 RX ADMIN — Medication 81 MILLIGRAM(S): at 13:27

## 2020-04-21 RX ADMIN — HEPARIN SODIUM 5000 UNIT(S): 5000 INJECTION INTRAVENOUS; SUBCUTANEOUS at 13:27

## 2020-04-21 RX ADMIN — Medication 25 MILLIGRAM(S): at 06:01

## 2020-04-21 RX ADMIN — HEPARIN SODIUM 5000 UNIT(S): 5000 INJECTION INTRAVENOUS; SUBCUTANEOUS at 06:01

## 2020-04-21 NOTE — PROGRESS NOTE ADULT - SUBJECTIVE AND OBJECTIVE BOX
Patient is a 69y old  Male who presents with a chief complaint of jaundice, abd pain (21 Apr 2020 07:14)      SUBJECTIVE / OVERNIGHT EVENTS: feels better, no cp, sob, abdominal pain, no n/v     MEDICATIONS  (STANDING):  aspirin enteric coated 81 milliGRAM(s) Oral daily  heparin   Injectable 5000 Unit(s) SubCutaneous three times a day  metoprolol succinate ER 25 milliGRAM(s) Oral daily  senna 2 Tablet(s) Oral at bedtime    MEDICATIONS  (PRN):        CAPILLARY BLOOD GLUCOSE        I&O's Summary    20 Apr 2020 07:01  -  21 Apr 2020 07:00  --------------------------------------------------------  IN: 800 mL / OUT: 0 mL / NET: 800 mL    21 Apr 2020 07:01  -  21 Apr 2020 14:02  --------------------------------------------------------  IN: 240 mL / OUT: 0 mL / NET: 240 mL        PHYSICAL EXAM:  GENERAL: NAD, well-developed  HEAD:  Atraumatic, Normocephalic  EYES: conjunctiva and sclera clear  NECK: No JVD  CHEST/LUNG: Clear to auscultation bilaterally; No wheeze  HEART: Regular rate and rhythm; S1S2  ABDOMEN: Soft, Nontender, Nondistended; Bowel sounds present  EXTREMITIES:  2+ Peripheral Pulses, No clubbing, cyanosis, or edema  PSYCH: AAOx3  NEUROLOGY: non-focal  SKIN: No rashes or lesions    LABS:                        11.6   4.65  )-----------( 187      ( 21 Apr 2020 05:48 )             33.9     04-21    142  |  105  |  13  ----------------------------<  102<H>  4.2   |  27  |  0.92    Ca    8.7      21 Apr 2020 05:48    TPro  6.3  /  Alb  3.4  /  TBili  3.5<H>  /  DBili  x   /  AST  95<H>  /  ALT  187<H>  /  AlkPhos  128<H>  04-21              RADIOLOGY & ADDITIONAL TESTS:    Imaging Personally Reviewed:    Consultant(s) Notes Reviewed:      Care Discussed with Consultants/Other Providers:

## 2020-04-21 NOTE — PROGRESS NOTE ADULT - SUBJECTIVE AND OBJECTIVE BOX
Interval Events:   No new complaints    Hospital Medications:  aspirin enteric coated 81 milliGRAM(s) Oral daily  heparin   Injectable 5000 Unit(s) SubCutaneous three times a day  metoprolol succinate ER 25 milliGRAM(s) Oral daily  senna 2 Tablet(s) Oral at bedtime        ROS:   General:  No fevers, chills or night sweats  ENT:  No sore throat or dysphagia  CV:  No pain or palpitations  Resp:  No dyspnea, cough or  wheezing  GI:  as above  Skin:  No rash or edema  Neuro: no weakness   Hematologic: no bleeding  Musculoskeletal: no muscle pain or join pain  Psych: no agitation      PHYSICAL EXAM:   Vital Signs:  Vital Signs Last 24 Hrs  T(C): 36.8 (21 Apr 2020 05:57), Max: 37 (20 Apr 2020 23:51)  T(F): 98.2 (21 Apr 2020 05:57), Max: 98.6 (20 Apr 2020 23:51)  HR: 64 (21 Apr 2020 05:57) (55 - 71)  BP: 146/81 (21 Apr 2020 05:57) (130/80 - 159/84)  BP(mean): --  RR: 18 (21 Apr 2020 05:57) (18 - 18)  SpO2: 97% (21 Apr 2020 05:57) (95% - 98%)  Daily     Daily     GENERAL:  NAD, Appears stated age  HEENT:  NC/AT,  conjunctivae clear and pink, sclera -anicteric  CHEST:  Normal Effort, Breath sounds clear  HEART:  RRR, S1 + S2, no murmurs  ABDOMEN:  Soft, non-tender, non-distended, normoactive bowel sounds,  no masses  EXTREMITIES:  no cyanosis or edema  SKIN:  Warm & Dry. No rash or erythema  NEURO:  Alert, oriented    LABS:                        11.6   4.65  )-----------( 187      ( 21 Apr 2020 05:48 )             33.9     Mean Cell Volume: 91.6 fl (04-21-20 @ 05:48)    04-21    142  |  105  |  13  ----------------------------<  102<H>  4.2   |  27  |  0.92    Ca    8.7      21 Apr 2020 05:48    TPro  6.3  /  Alb  3.4  /  TBili  3.5<H>  /  DBili  x   /  AST  95<H>  /  ALT  187<H>  /  AlkPhos  128<H>  04-21    LIVER FUNCTIONS - ( 21 Apr 2020 05:48 )  Alb: 3.4 g/dL / Pro: 6.3 g/dL / ALK PHOS: 128 U/L / ALT: 187 U/L / AST: 95 U/L / GGT: x                                       11.6   4.65  )-----------( 187      ( 21 Apr 2020 05:48 )             33.9                         11.0   4.89  )-----------( 196      ( 20 Apr 2020 06:42 )             33.5                         11.8   7.03  )-----------( 218      ( 19 Apr 2020 06:17 )             34.5       Imaging:

## 2020-04-22 ENCOUNTER — TRANSCRIPTION ENCOUNTER (OUTPATIENT)
Age: 70
End: 2020-04-22

## 2020-04-22 ENCOUNTER — RESULT REVIEW (OUTPATIENT)
Age: 70
End: 2020-04-22

## 2020-04-22 VITALS
RESPIRATION RATE: 18 BRPM | HEART RATE: 66 BPM | DIASTOLIC BLOOD PRESSURE: 79 MMHG | SYSTOLIC BLOOD PRESSURE: 159 MMHG | OXYGEN SATURATION: 98 % | TEMPERATURE: 98 F

## 2020-04-22 LAB
ALBUMIN SERPL ELPH-MCNC: 3.5 G/DL — SIGNIFICANT CHANGE UP (ref 3.3–5)
ALP SERPL-CCNC: 127 U/L — HIGH (ref 40–120)
ALT FLD-CCNC: 234 U/L — HIGH (ref 10–45)
ANION GAP SERPL CALC-SCNC: 10 MMOL/L — SIGNIFICANT CHANGE UP (ref 5–17)
APTT BLD: 27.7 SEC — SIGNIFICANT CHANGE UP (ref 27.5–36.3)
AST SERPL-CCNC: 120 U/L — HIGH (ref 10–40)
BILIRUB SERPL-MCNC: 2.9 MG/DL — HIGH (ref 0.2–1.2)
BUN SERPL-MCNC: 14 MG/DL — SIGNIFICANT CHANGE UP (ref 7–23)
CALCIUM SERPL-MCNC: 8.9 MG/DL — SIGNIFICANT CHANGE UP (ref 8.4–10.5)
CHLORIDE SERPL-SCNC: 104 MMOL/L — SIGNIFICANT CHANGE UP (ref 96–108)
CO2 SERPL-SCNC: 27 MMOL/L — SIGNIFICANT CHANGE UP (ref 22–31)
CREAT SERPL-MCNC: 0.96 MG/DL — SIGNIFICANT CHANGE UP (ref 0.5–1.3)
GGT SERPL-CCNC: 238 U/L — HIGH (ref 9–50)
GLUCOSE SERPL-MCNC: 103 MG/DL — HIGH (ref 70–99)
HCT VFR BLD CALC: 35.4 % — LOW (ref 39–50)
HGB BLD-MCNC: 12.2 G/DL — LOW (ref 13–17)
INR BLD: 1 RATIO — SIGNIFICANT CHANGE UP (ref 0.88–1.16)
MCHC RBC-ENTMCNC: 31.6 PG — SIGNIFICANT CHANGE UP (ref 27–34)
MCHC RBC-ENTMCNC: 34.5 GM/DL — SIGNIFICANT CHANGE UP (ref 32–36)
MCV RBC AUTO: 91.7 FL — SIGNIFICANT CHANGE UP (ref 80–100)
NRBC # BLD: 0 /100 WBCS — SIGNIFICANT CHANGE UP (ref 0–0)
PLATELET # BLD AUTO: 229 K/UL — SIGNIFICANT CHANGE UP (ref 150–400)
POTASSIUM SERPL-MCNC: 3.8 MMOL/L — SIGNIFICANT CHANGE UP (ref 3.5–5.3)
POTASSIUM SERPL-SCNC: 3.8 MMOL/L — SIGNIFICANT CHANGE UP (ref 3.5–5.3)
PROT SERPL-MCNC: 6.2 G/DL — SIGNIFICANT CHANGE UP (ref 6–8.3)
PROTHROM AB SERPL-ACNC: 11.5 SEC — SIGNIFICANT CHANGE UP (ref 10–13.1)
RBC # BLD: 3.86 M/UL — LOW (ref 4.2–5.8)
RBC # FLD: 13.7 % — SIGNIFICANT CHANGE UP (ref 10.3–14.5)
SODIUM SERPL-SCNC: 141 MMOL/L — SIGNIFICANT CHANGE UP (ref 135–145)
WBC # BLD: 5.69 K/UL — SIGNIFICANT CHANGE UP (ref 3.8–10.5)
WBC # FLD AUTO: 5.69 K/UL — SIGNIFICANT CHANGE UP (ref 3.8–10.5)

## 2020-04-22 PROCEDURE — 88312 SPECIAL STAINS GROUP 1: CPT | Mod: 26

## 2020-04-22 PROCEDURE — 86301 IMMUNOASSAY TUMOR CA 19-9: CPT

## 2020-04-22 PROCEDURE — 87040 BLOOD CULTURE FOR BACTERIA: CPT

## 2020-04-22 PROCEDURE — 85610 PROTHROMBIN TIME: CPT

## 2020-04-22 PROCEDURE — 82803 BLOOD GASES ANY COMBINATION: CPT

## 2020-04-22 PROCEDURE — 76705 ECHO EXAM OF ABDOMEN: CPT

## 2020-04-22 PROCEDURE — 80053 COMPREHEN METABOLIC PANEL: CPT

## 2020-04-22 PROCEDURE — 86900 BLOOD TYPING SEROLOGIC ABO: CPT

## 2020-04-22 PROCEDURE — 99285 EMERGENCY DEPT VISIT HI MDM: CPT

## 2020-04-22 PROCEDURE — 84145 PROCALCITONIN (PCT): CPT

## 2020-04-22 PROCEDURE — 74177 CT ABD & PELVIS W/CONTRAST: CPT

## 2020-04-22 PROCEDURE — 43259 EGD US EXAM DUODENUM/JEJUNUM: CPT

## 2020-04-22 PROCEDURE — 82977 ASSAY OF GGT: CPT

## 2020-04-22 PROCEDURE — 86790 VIRUS ANTIBODY NOS: CPT

## 2020-04-22 PROCEDURE — 80061 LIPID PANEL: CPT

## 2020-04-22 PROCEDURE — A9585: CPT

## 2020-04-22 PROCEDURE — 88305 TISSUE EXAM BY PATHOLOGIST: CPT

## 2020-04-22 PROCEDURE — 43239 EGD BIOPSY SINGLE/MULTIPLE: CPT | Mod: 59

## 2020-04-22 PROCEDURE — 74183 MRI ABD W/O CNTR FLWD CNTR: CPT

## 2020-04-22 PROCEDURE — 82248 BILIRUBIN DIRECT: CPT

## 2020-04-22 PROCEDURE — 86850 RBC ANTIBODY SCREEN: CPT

## 2020-04-22 PROCEDURE — 86901 BLOOD TYPING SEROLOGIC RH(D): CPT

## 2020-04-22 PROCEDURE — 36415 COLL VENOUS BLD VENIPUNCTURE: CPT

## 2020-04-22 PROCEDURE — 87635 SARS-COV-2 COVID-19 AMP PRB: CPT

## 2020-04-22 PROCEDURE — 85730 THROMBOPLASTIN TIME PARTIAL: CPT

## 2020-04-22 PROCEDURE — 82247 BILIRUBIN TOTAL: CPT

## 2020-04-22 PROCEDURE — 83690 ASSAY OF LIPASE: CPT

## 2020-04-22 PROCEDURE — 80074 ACUTE HEPATITIS PANEL: CPT

## 2020-04-22 PROCEDURE — 88305 TISSUE EXAM BY PATHOLOGIST: CPT | Mod: 26

## 2020-04-22 PROCEDURE — 83735 ASSAY OF MAGNESIUM: CPT

## 2020-04-22 PROCEDURE — 85027 COMPLETE CBC AUTOMATED: CPT

## 2020-04-22 PROCEDURE — 88312 SPECIAL STAINS GROUP 1: CPT

## 2020-04-22 PROCEDURE — 93005 ELECTROCARDIOGRAM TRACING: CPT

## 2020-04-22 PROCEDURE — 84100 ASSAY OF PHOSPHORUS: CPT

## 2020-04-22 PROCEDURE — 99239 HOSP IP/OBS DSCHRG MGMT >30: CPT

## 2020-04-22 RX ORDER — SENNA PLUS 8.6 MG/1
2 TABLET ORAL
Qty: 0 | Refills: 0 | DISCHARGE
Start: 2020-04-22

## 2020-04-22 RX ADMIN — Medication 25 MILLIGRAM(S): at 06:23

## 2020-04-22 RX ADMIN — Medication 81 MILLIGRAM(S): at 13:05

## 2020-04-22 NOTE — PROGRESS NOTE ADULT - REASON FOR ADMISSION
jaundice, abd pain
Jaundice, abdominal pain
jaundice, abd pain

## 2020-04-22 NOTE — PROGRESS NOTE ADULT - PROBLEM SELECTOR PROBLEM 3
RHINA (acute kidney injury)

## 2020-04-22 NOTE — DISCHARGE NOTE PROVIDER - NSDCCPCAREPLAN_GEN_ALL_CORE_FT
PRINCIPAL DISCHARGE DIAGNOSIS  Diagnosis: Pancreatitis  Assessment and Plan of Treatment: Pancreatitis is a condition which causes severe belly pain, irritated, or swollen Pancreas. The two main causes are gallstones or alcohol abuse.  Follow a low fat diet and avoid Alcohol (Avoid fried foods, desserts, whole milk dairy products, fatty meats)  Please call GI office for pathology results in 2 weeks.  Repeat the upper endoscopic ultrasound in 1 month for surveillance. Call [phone] to schedule.        SECONDARY DISCHARGE DIAGNOSES  Diagnosis: Jaundice  Assessment and Plan of Treatment: s/p upper endoscopy. Cleared by GI for discharge  Please call GI office for pathology results in 2 weeks.  Repeat the upper endoscopic ultrasound in 1 month for surveillance. Call [phone] to schedule.    Diagnosis: Hypertension  Assessment and Plan of Treatment: Low salt diet  Activity as tolerated.  Take all medication as prescribed.  Follow up with your medical doctor for routine blood pressure monitoring at your next visit.  Notify your doctor if you have any of the following symptoms:   Dizziness, Lightheadedness, Blurry vision, Headache, Chest pain, Shortness of breath    Diagnosis: RHINA (acute kidney injury)  Assessment and Plan of Treatment: Initial RHINA, NOW RESOLVED  Resume your HCTZ & Losartan medications, remain hydrated  Followup with your PCP in 1-2 weeks    Diagnosis: History of DVT (deep vein thrombosis)  Assessment and Plan of Treatment: on full dose ASA, continue your home regimen as advised  Follow-up with your PCP in 1-2 weeks.

## 2020-04-22 NOTE — DISCHARGE NOTE PROVIDER - NSDCMRMEDTOKEN_GEN_ALL_CORE_FT
Aspirin Enteric Coated 325 mg oral delayed release tablet: 1 tab(s) orally once a day  Cialis 5 mg oral tablet: 1 tab(s) orally once a day  hydroCHLOROthiazide 25 mg oral tablet: 1 tab(s) orally once a day  losartan 100 mg oral tablet: 1 tab(s) orally once a day  metoprolol succinate 25 mg oral tablet, extended release: 1 tab(s) orally once a day  senna oral tablet: 2 tab(s) orally once a day (at bedtime)

## 2020-04-22 NOTE — PROGRESS NOTE ADULT - PROBLEM SELECTOR PLAN 2
- known [place], but recurrent direct bili concerning for obstruction/cholestasis  - ddx includes IPMN vs intrahepatic obstruction vs sludge  - seen by GI  - EUS tomorrow
- known [place], but recurrent direct bili concerning for obstruction/cholestasis  - ddx includes IPMN vs intrahepatic obstruction vs sludge  - seen by GI  - f/up advanced GI reccs for poss EUS
- known [place], but recurrent direct bili concerning for obstruction/cholestasis  - ddx includes IPMN vs intrahepatic obstruction vs sludge  - s/p EUS today
- known [place], but recurrent direct bili concerning for obstruction/cholestasis  - ddx includes IPMN vs intrahepatic obstruction vs sludge  - seen by GI  - f/up advanced GI reccs for poss ERCP/biopsy
- known [place], but recurrent direct bili concerning for obstruction/cholestasis  - ddx includes IPMN vs intrahepatic obstruction vs sludge  - seen by GI  - f/up advanced GI reccs for poss ERCP/biopsy

## 2020-04-22 NOTE — DISCHARGE NOTE PROVIDER - HOSPITAL COURSE
69M c hx gilbert's disease, HTN, DVT on ASA only, poss IPMN, hepatic steatosis, recent hospitalization for hyperbilirubinemia/RHINA/thickened GB of unclear etiol (Mar '20), pw abd pain and jaundice, diagnosed with acute pancreatitis, admitted to [facility] for further evaluation. During hospital course, patient seen by GI for initial concern for biliary obstruction, given the abdominal pain and found to have significant elevated bilirubin, with sludge noted in gallbladder, recommended for MRCP which showed Multiple pancreatic cystic lesions suggesting side branch IPMN. A lesion in the pancreatic body results in mild pancreatic ductal dilatation, and GB wall thickening. Patient also with elevated CA-19-9:> 1000. Pt subsequently underwent an EUS, which showed a 13 x 8 mm cystic lesion was seen in the genu of the pancreas, no mass or mural nodule was visualized, pathology was collected. Patient able to tolerate diet, was cleared by GI for discharge home, with plan to f/u pathology results in 2 weeks, and repeat EUS in 1 month for surveillance. Patient to also f/u with PCP in 1-2 weeks for post-discharge routine follow-up.

## 2020-04-22 NOTE — DISCHARGE NOTE NURSING/CASE MANAGEMENT/SOCIAL WORK - PATIENT PORTAL LINK FT
You can access the FollowMyHealth Patient Portal offered by [facility] by registering at the following website: [email or web address]. By joining [facility]’s FollowMyHealth portal, you will also be able to view your health information using other applications (apps) compatible with our system.

## 2020-04-22 NOTE — PROGRESS NOTE ADULT - ASSESSMENT
68 y/o M w/ hx of HTN, Gilbert's syndrome, hepatic steatosis, and pancreatic cysts likely representing IPMN based on MRI read, with recent hospitalization for elevated bilirubin in setting of gastroenteritis, with negative viral hepatitis and chronic liver disease work-up, presenting with abdominal pain x 4 days (now resolved) and found to be jaundiced at an urgent care, diagnosed with acute pancreatitis, admitted to [facility] for further evaluation.    MRI showed: No biliary dilatation. Multiple pancreatic cystic lesions suggesting side branch IPMN. A lesion in the pancreatic body results in mild pancreatic ductal dilatation; Restricted diffusion involving the pancreatic body; question mild pancreatitis.  Elevated CA-19-9:> 1000    # Abnormal liver enzymes( improving) : May represent transient obstruction due to sludge vs passed stone that has since resolved as the patient no longer complains of abdominal pain  # Pancreatic lesions: Likely represents IPMN. Differential includes mucinous cyst and serous cyst adenoma and malignancy.  # Acute pancreatitis: Possibly secondary to transient biliary obstruction. Differential includes  autoimmune pancreatitis.  # Hepatic steatosis  # Gilbert's syndrome    Recommendations:  - Diet as tolerated  - NPO after midnight for EUS tomorrow   - CMP and INR daily
69M c hx gilbert's disease, HTN, DVT on ASA only, poss IPMN, hepatic steatosis, recent hospitalization for hyperbilirubinemia/RHINA/thickened GB of unclear etiol (Mar '20), pw pancreatitis, RHINA, transaminitis, MRCP with pancreatic cystic lesions
69M c hx gilbert's disease, HTN, DVT on ASA only, poss IPMN, hepatic steatosis, recent hospitalization for hyperbilirubinemia/RHINA/thickened GB of unclear etiol (Mar '20), pw pancreatitis, RHINA, transaminitis, MRCP with pancreatic cystic lesions
Impression:    68 y/o M w/ hx of HTN, Gilbert's syndrome, hepatic steatosis, and pancreatic cysts likely representing IPMN based on MRI read, with recent hospitalization for elevated bilirubin in setting of gastroenteritis, with negative viral hepatitis and chronic liver disease work-up, presenting with abdominal pain x 4 days (now resolved) and found to be jaundiced at an urgent care, diagnosed with acute pancreatitis, admitted to [facility] for further evaluation.    # Concern for biliary obstruction: Patient with abdominal pain and found to have significant elevated bilirubin, with sludge noted in gallbladder, however, with bile ducts normal caliber on CT A/P and abdominal U/S. May represent transient obstruction due to sludge vs passed stone that has since resolved as the patient no longer complains of abdominal pain, is tolerating a regular diet, and liver enzymes are downtrending, however, also noted to have elevated CA 19-9 with concern for underlying malignancy. MRCP obtained with read pending.  # Abnormal liver enzymes: Concern for possible transient biliary obstruction. Prior work-up in 3/20 negative for chronic liver disease and viral hepatitides.   # Pancreatic lesions: Likely represents IPMN. Differential includes mucinous cyst and serous cyst adenoma and malignancy.  # Acute pancreatitis: Possibly secondary to transient biliary obstruction. Differential includes  autoimmune pancreatitis.  # Hepatic steatosis  # Gilbert's syndrome    Recommendations:  - F/U MRCP official read  - If patient is to undergo EUS/ERCP (later this week), please obtain COVID swab prior for risk stratification  - Continue aggressive IV hydration, LR @ 200 ml/hr  - Diet as tolerated  - PRN pain control per primary team  - Rest of care per primary team
69M c hx gilbert's disease, HTN, DVT on ASA only, poss IPMN, hepatic steatosis, recent hospitalization for hyperbilirubinemia/RHINA/thickened GB of unclear etiol (Mar '20), pw pancreatitis, RHINA, transaminitis, MRCP with pancreatic cystic lesions
69M c hx gilbert's disease, HTN, DVT on ASA only, poss IPMN, hepatic steatosis, recent hospitalization for hyperbilirubinemia/RHINA/thickened GB of unclear etiol (Mar '20), pw pancreatitis, RHINA, transaminitis, leukocytosis of unclear etiol
69M c hx gilbert's disease, HTN, DVT on ASA only, poss IPMN, hepatic steatosis, recent hospitalization for hyperbilirubinemia/RHINA/thickened GB of unclear etiol (Mar '20), pw pancreatitis, RHINA, transaminitis, leukocytosis of unclear etiol

## 2020-04-22 NOTE — PROGRESS NOTE ADULT - PROBLEM SELECTOR PLAN 3
- resolved s/p IVF hydration  - losartan and hctz on hold for now
- resolved s/p IVF hydration  - losartan and hctz on hold for now
- cont IVF  - losartan and hctz on hold
- resolved s/p IVF hydration  - can restart home Losartan 100mg po daily and Hctz 25mg po daily
- resolved s/p IVF hydration  - losartan and hctz on hold  - can restart if BP uptrending

## 2020-04-22 NOTE — DISCHARGE NOTE NURSING/CASE MANAGEMENT/SOCIAL WORK - NSDCPEPTSTRK_GEN_ALL_CORE
Call 911 for stroke/Need for follow up after discharge/Stroke warning signs and symptoms/Stroke education booklet/Prescribed medications/Risk factors for stroke/Stroke support groups for patients, families, and friends/Signs and symptoms of stroke

## 2020-04-22 NOTE — DISCHARGE NOTE PROVIDER - NSDCFUADDAPPT_GEN_ALL_CORE_FT
Please call GI office for pathology results in 2 weeks.  Repeat the upper endoscopic ultrasound in 1 month for surveillance. Call [phone] to schedule.

## 2020-04-22 NOTE — PROGRESS NOTE ADULT - PROBLEM SELECTOR PROBLEM 1
Acute biliary pancreatitis without infection or necrosis

## 2020-04-22 NOTE — DISCHARGE NOTE PROVIDER - NSDCFUSCHEDAPPT_GEN_ALL_CORE_FT
YESICA ROCHA ; 06/03/2020 ; NPP Int[facility] YESICA ROCHA ; 06/03/2020 ; NPP Int[facility]

## 2020-04-22 NOTE — PROGRESS NOTE ADULT - PROBLEM SELECTOR PLAN 5
- Likely represents IPMN. Differential includes mucinous cyst and serous cyst adenoma.  - EUS tomorrow   - Ca 19-9 elevated
- Likely represents IPMN. Differential includes mucinous cyst and serous cyst adenoma.  - f/u advanced GI recs regarding possible EUS; tissue biopsy  - Ca 19-9 elevated
- Likely represents IPMN. Differential includes mucinous cyst and serous cyst adenoma.  - s/p EUS today ; f/up pathology   - Ca 19-9 elevated
-Concern for possible transient biliary obstruction. Prior work-up in 3/20 negative for chronic liver disease and viral hepatitides.   - GI reccs appreciated
-Concern for possible transient biliary obstruction. Prior work-up in 3/20 negative for chronic liver disease and viral hepatitides.   - GI reccs appreciated

## 2020-04-22 NOTE — PROGRESS NOTE ADULT - PROBLEM SELECTOR PLAN 6
- repleted  - monitor CMP daily
- repleted  - monitor CMP daily
- Likely represents IPMN. Differential includes mucinous cyst and serous cyst adenoma.  - f/u advanced GI recs regarding possible EUS/ERCP; tissue biopsy  - Ca 19-9 elevated
- Likely represents IPMN. Differential includes mucinous cyst and serous cyst adenoma.  - f/u advanced GI recs regarding possible EUS/ERCP; tissue biopsy  - Ca 19-9 elevated
- repleted  - monitor CMP daily

## 2020-04-22 NOTE — PROGRESS NOTE ADULT - PROBLEM SELECTOR PLAN 4
- resolved  - unlikely acute jessy, but poss chronic cholecystitis and may need eventual cholecystectomy  - hold off abx for now, but low threshold for starting empiric abx  - blood cultures negative
- resolved  - unlikely acute jessy, but poss chronic cholecystitis and may need eventual cholecystectomy  - hold off abx for now, but low threshold for starting empiric abx  - f/up blood cultures

## 2020-04-22 NOTE — PROGRESS NOTE ADULT - PROBLEM SELECTOR PROBLEM 6
Hypokalemia
IPMN (intraductal papillary mucinous neoplasm)
IPMN (intraductal papillary mucinous neoplasm)

## 2020-04-22 NOTE — DISCHARGE NOTE PROVIDER - PROVIDER TOKENS
PROVIDER:[TOKEN:[[id number]],FOLLOWUP:[1 week],ESTABLISHEDPATIENT:[T]],PROVIDER:[TOKEN:[[id number]],FOLLOWUP:[1 week],ESTABLISHEDPATIENT:[T]]

## 2020-04-22 NOTE — DISCHARGE NOTE PROVIDER - CARE PROVIDER_API CALL
Kristopher Apodaca (DO)  Internal Medicine  [address], [address]  [place] [address]  Phone: [phone]  Fax: [phone]  Established Patient  Follow Up Time: 1 week    John Domingo (MD)  Gastroenterology; Internal Medicine; Transplant Hepatology  [address] [place] [address]  Phone: [phone]  Fax: [phone]  Established Patient  Follow Up Time: 1 week

## 2020-04-22 NOTE — PROGRESS NOTE ADULT - SUBJECTIVE AND OBJECTIVE BOX
Patient is a 69y old  Male who presents with a chief complaint of jaundice, abd pain (21 Apr 2020 14:01)      SUBJECTIVE / OVERNIGHT EVENTS:    MEDICATIONS  (STANDING):  aspirin enteric coated 81 milliGRAM(s) Oral daily  heparin   Injectable 5000 Unit(s) SubCutaneous three times a day  metoprolol succinate ER 25 milliGRAM(s) Oral daily  senna 2 Tablet(s) Oral at bedtime    MEDICATIONS  (PRN):      Vital Signs Last 24 Hrs  T(C): 36.9 (22 Apr 2020 06:16), Max: 36.9 (22 Apr 2020 06:16)  T(F): 98.5 (22 Apr 2020 06:16), Max: 98.5 (22 Apr 2020 06:16)  HR: 66 (22 Apr 2020 06:16) (52 - 66)  BP: 162/93 (22 Apr 2020 06:16) (120/66 - 162/93)  BP(mean): --  RR: 18 (22 Apr 2020 06:16) (18 - 18)  SpO2: 95% (22 Apr 2020 06:16) (95% - 98%)  CAPILLARY BLOOD GLUCOSE        I&O's Summary    21 Apr 2020 07:01  -  22 Apr 2020 07:00  --------------------------------------------------------  IN: 640 mL / OUT: 0 mL / NET: 640 mL        PHYSICAL EXAM:  GENERAL: NAD, well-developed  HEAD:  Atraumatic, Normocephalic  EYES: EOMI, PERRLA, conjunctiva and sclera clear  NECK: Supple, No JVD  CHEST/LUNG: Clear to auscultation bilaterally; No wheeze  HEART: Regular rate and rhythm; No murmurs, rubs, or gallops  ABDOMEN: Soft, Nontender, Nondistended; Bowel sounds present  EXTREMITIES:  2+ Peripheral Pulses, No clubbing, cyanosis, or edema  PSYCH: AAOx3  NEUROLOGY: non-focal  SKIN: No rashes or lesions    LABS:                        12.2   5.69  )-----------( 229      ( 22 Apr 2020 06:22 )             35.4     04-22    141  |  104  |  14  ----------------------------<  103<H>  3.8   |  27  |  0.96    Ca    8.9      22 Apr 2020 06:22    TPro  6.2  /  Alb  3.5  /  TBili  2.9<H>  /  DBili  x   /  AST  120<H>  /  ALT  234<H>  /  AlkPhos  127<H>  04-22    PT/INR - ( 22 Apr 2020 09:45 )   PT: 11.5 sec;   INR: 1.00 ratio         PTT - ( 22 Apr 2020 09:45 )  PTT:27.7 sec          RADIOLOGY & ADDITIONAL TESTS:    Imaging Personally Reviewed:    Consultant(s) Notes Reviewed:      Care Discussed with Consultants/Other Providers: Patient is a 69y old  Male who presents with a chief complaint of jaundice, abd pain (21 Apr 2020 14:01)      SUBJECTIVE / OVERNIGHT EVENTS: Pt seen and examined. No acute events overnight. He is s/p EUS done this AM. Procedure was well tolerated. He denies abd pain, n/v. Per GI pt is stable for discharge home today.    MEDICATIONS  (STANDING):  aspirin enteric coated 81 milliGRAM(s) Oral daily  heparin   Injectable 5000 Unit(s) SubCutaneous three times a day  metoprolol succinate ER 25 milliGRAM(s) Oral daily  senna 2 Tablet(s) Oral at bedtime    MEDICATIONS  (PRN):      Vital Signs Last 24 Hrs  T(C): 36.9 (22 Apr 2020 06:16), Max: 36.9 (22 Apr 2020 06:16)  T(F): 98.5 (22 Apr 2020 06:16), Max: 98.5 (22 Apr 2020 06:16)  HR: 66 (22 Apr 2020 06:16) (52 - 66)  BP: 162/93 (22 Apr 2020 06:16) (120/66 - 162/93)  BP(mean): --  RR: 18 (22 Apr 2020 06:16) (18 - 18)  SpO2: 95% (22 Apr 2020 06:16) (95% - 98%)  CAPILLARY BLOOD GLUCOSE        I&O's Summary    21 Apr 2020 07:01  -  22 Apr 2020 07:00  --------------------------------------------------------  IN: 640 mL / OUT: 0 mL / NET: 640 mL        PHYSICAL EXAM:  GENERAL: NAD, afebrile  HEAD:  Atraumatic, Normocephalic  EYES: EOMI, PERRLA, conjunctiva clear, mild scleral icterus   NECK: Supple, No JVD  CHEST/LUNG: Clear to auscultation bilaterally; No wheeze  HEART: Regular rate and rhythm; No murmurs, rubs, or gallops  ABDOMEN: Soft, Nontender, Nondistended; Bowel sounds present  EXTREMITIES:  2+ Peripheral Pulses, No clubbing, cyanosis, or edema  PSYCH: AAOx3  NEUROLOGY: non-focal  SKIN: No rashes or lesions    LABS:                        12.2   5.69  )-----------( 229      ( 22 Apr 2020 06:22 )             35.4     04-22    141  |  104  |  14  ----------------------------<  103<H>  3.8   |  27  |  0.96    Ca    8.9      22 Apr 2020 06:22    TPro  6.2  /  Alb  3.5  /  TBili  2.9<H>  /  DBili  x   /  AST  120<H>  /  ALT  234<H>  /  AlkPhos  127<H>  04-22    PT/INR - ( 22 Apr 2020 09:45 )   PT: 11.5 sec;   INR: 1.00 ratio         PTT - ( 22 Apr 2020 09:45 )  PTT:27.7 sec          RADIOLOGY & ADDITIONAL TESTS:    Imaging Personally Reviewed:  UPPER EUS 4/22/2020     EGD:                       - Tortuous esophagus.                       - Erythematous mucosa in the antrum. Biopsied.                       - Normal examined duodenum. The major papilla was normal.                      EUS:                       - A 13 x 8 mm cystic lesion was seen in the genu of the pancreas, which                        appeared to communicate with a dilated main pancreatic duct measuring up to                        3.8 mm inthe head. No mass or mural nodule was visualized. There were                        associated septations or dilated side branches adjacent to the cyst.                       Pancreatic parenchymal abnormalities consisting of hyperechoic strands,                      hyperechoic foci and lobularity were noted in the entire pancreas.                       - The pancreatic duct measured up to 3 mm in the head near the ampulla, 3.8                        mm in diameter in the neck, 2.7 mm in the body, and 1.8 mm in the tail.                       - The common bile duct was normal.                       - Fatty liver.                       - 14 x 17 mm periportal lymph node, likely reactive/inflammatory.      Consultant(s) Notes Reviewed:  GI

## 2020-04-22 NOTE — PROGRESS NOTE ADULT - PROBLEM SELECTOR PROBLEM 5
IPMN (intraductal papillary mucinous neoplasm)
Transaminitis
Transaminitis

## 2020-04-22 NOTE — DISCHARGE NOTE NURSING/CASE MANAGEMENT/SOCIAL WORK - NSDCPNINST_GEN_ALL_CORE
call for  follow up  appointment  with primary care  md    diet  medications  activity  as per md    any severe pain  nausea   vomiting fevers   blood I urine or stool  any  problems  call md  call 911 [facility]  EMERGENCY ROOM

## 2020-04-22 NOTE — PROGRESS NOTE ADULT - PROBLEM SELECTOR PLAN 1
- MRCP with Multiple pancreatic cystic lesions suggesting side branch IPMN. A lesion in the pancreatic body results in mild pancreatic ductal dilatation   -also has gallbladder wall thickening   - no gallstones on MRCP last time or on ultrasounds  - s/p aggressive IV hydration  - low fat diet  - pain control as needed  - GI follow up regarding EUS
- MRCP with Multiple pancreatic cystic lesions suggesting side branch IPMN. A lesion in the pancreatic body results in mild pancreatic ductal dilatation   -also has gallbladder wall thickening   - no gallstones on MRCP last time or on ultrasounds  - s/p aggressive IV hydration  - low fat diet  - pain control as needed  - plan for EUS tomorrow   - NPO after midnight
- MRCP with Multiple pancreatic cystic lesions suggesting side branch IPMN. A lesion in the pancreatic body results in mild pancreatic ductal dilatation   -also has gallbladder wall thickening   - no gallstones on MRCP last time or on ultrasounds  - s/p aggressive IV hydration  - s/p EUS ; f/up pathology report in 2 weeks  - per GI will repeat EUS in 1 month for surveillance  - advance diet (low fat diet)
- broad differential ; could be 2/2 biliary sludge or IPMN  - no gallstones on MRCP last time or on ultrasounds  - CT Abd/pelvis shows mild peripancreatic fat infiltration suspicious for pancreatitis,  hypoattenuating pancreatic lesions  likely representing side branch IPMNs and no pancreatic ductal dilatation.  - pt also has mild thickening/edema of the gallbladder wall again noted without hyperdense cholelithiasis.  - Continue aggressive IV hydration, LR @ 200 ml/hr  - Diet as tolerated  - F/U lipid panel  - pain control as needed
- broad differential ; could be 2/2 biliary sludge or IPMN  - no gallstones on MRCP last time or on ultrasounds  - CT Abd/pelvis shows mild peripancreatic fat infiltration suspicious for pancreatitis,  hypoattenuating pancreatic lesions  likely representing side branch IPMNs and no pancreatic ductal dilatation.  - pt also has mild thickening/edema of the gallbladder wall again noted without hyperdense cholelithiasis.  - s/p aggressive IV hydration  - Diet as tolerated  - lipid panel reviewed  - pain control as needed

## 2020-04-22 NOTE — DISCHARGE NOTE NURSING/CASE MANAGEMENT/SOCIAL WORK - NSDCFUADDAPPT_GEN_ALL_CORE_FT
Please call GI office for pathology results in 2 weeks.  Repeat the upper endoscopic ultrasound in 1 month for surveillance. Call [phone] to schedule.

## 2020-04-22 NOTE — PROGRESS NOTE ADULT - ATTENDING COMMENTS
Resolved abdominal pain  EUS likely tomorrow
Jaundice , pancreatitis  Improving abdominal pain  Abnormal imaging as noted  Advance to low fat diet  Likely EUS this week  Will discuss with advanced endo attending  will follow up
D/w wife Dmitry updated on full plan of care
Pt is clinically stable for discharge. Telephone GI office for pathology results in 2 weeks. Repeat the upper endoscopic ultrasound in 1 month for surveillance. Call [phone] to schedule.
Pt's wife Dmitry updated on full plan of care.
Pt's wife Dmitry updated on full plan of care.

## 2020-04-23 LAB
CULTURE RESULTS: SIGNIFICANT CHANGE UP
CULTURE RESULTS: SIGNIFICANT CHANGE UP
HEV AB FLD QL: NEGATIVE — SIGNIFICANT CHANGE UP
SARS-COV-2 RNA SPEC QL NAA+PROBE: SIGNIFICANT CHANGE UP
SPECIMEN SOURCE: SIGNIFICANT CHANGE UP
SPECIMEN SOURCE: SIGNIFICANT CHANGE UP
SURGICAL PATHOLOGY STUDY: SIGNIFICANT CHANGE UP

## 2020-05-01 LAB — HEV IGM SER QL: ABNORMAL

## 2020-05-04 ENCOUNTER — APPOINTMENT (OUTPATIENT)
Dept: HEPATOLOGY | Facility: CLINIC | Age: 70
End: 2020-05-04
Payer: MEDICARE

## 2020-05-04 PROCEDURE — 99443: CPT | Mod: CR

## 2020-05-05 NOTE — HISTORY OF PRESENT ILLNESS
[Verbal consent obtained from patient] : the patient, [unfilled] [de-identified] : This patient has history of fatty liver identified on prior ultrasound for several years.  His body weight had been as high as 250 pounds and he did drink alcohol on a regular basis.  The patient had mild biochemical liver tests and also had elevated bilirubin, associated with Gilbert's syndrome.\par \par In January and February the patient visited [place] and returned to [place] in March and at that time became ill with elevation of aminotransferase values and bilirubin.  Bilirubin values are reached.  14.  The patient was admitted to hospital and had elevation of CA 19-9 to a value of 1097.  Abdominal imaging reveals a pancreatic cyst and a pancreatic duct abnormality, which was evaluated by EUS, showing a 13 x 8 mm cystic lesion.  The patient's liver tests have consistently resolved.  The patient now has a sense of well-being.  No abdominal pain and good energy.  The patient did have weight loss during his acute illness from 250 pounds to a current weight of 230 pounds.  The patient has been recommended to have a repeat endoscopic ultrasound.\par \par Of interest on recent testing.  Hepatitis A IgM antibody has been positive.  Although this is consistent with a visit to [place] and acute hepatitis A infection.  This is somewhat puzzling in that in 2017.  The patient had evidence of immunity to hepatitis A.

## 2020-05-05 NOTE — ASSESSMENT
[FreeTextEntry1] : This patient has a history of underlying fatty liver disease, and following a visit to [place] developed acute liver injury with jaundice.  The patient may have had hepatitis A although he had been assessed is immune to hepatitis A in the past.  The patient also has pancreatic duct abnormalities that are being evaluated.  I will have the patient repeat laboratory testing in July with a return visit.  The patient is now feeling well with resolution of his liver test abnormalities.

## 2020-06-11 ENCOUNTER — OUTPATIENT (OUTPATIENT)
Dept: OUTPATIENT SERVICES | Facility: HOSPITAL | Age: 70
LOS: 1 days | End: 2020-06-11
Payer: MEDICARE

## 2020-06-11 DIAGNOSIS — Z11.59 ENCOUNTER FOR SCREENING FOR OTHER VIRAL DISEASES: ICD-10-CM

## 2020-06-11 LAB — SARS-COV-2 RNA SPEC QL NAA+PROBE: SIGNIFICANT CHANGE UP

## 2020-06-11 PROCEDURE — U0003: CPT

## 2020-06-12 ENCOUNTER — APPOINTMENT (OUTPATIENT)
Dept: GASTROENTEROLOGY | Facility: HOSPITAL | Age: 70
End: 2020-06-12

## 2020-06-12 ENCOUNTER — OUTPATIENT (OUTPATIENT)
Dept: OUTPATIENT SERVICES | Facility: HOSPITAL | Age: 70
LOS: 1 days | End: 2020-06-12
Payer: MEDICARE

## 2020-06-12 DIAGNOSIS — K86.2 CYST OF PANCREAS: ICD-10-CM

## 2020-06-12 PROCEDURE — 43259 EGD US EXAM DUODENUM/JEJUNUM: CPT

## 2020-06-12 PROCEDURE — 43259 EGD US EXAM DUODENUM/JEJUNUM: CPT | Mod: GC

## 2020-07-06 LAB
AFP-TM SERPL-MCNC: 2.2 NG/ML
ALBUMIN SERPL ELPH-MCNC: 4.9 G/DL
ALP BLD-CCNC: 56 U/L
ALT SERPL-CCNC: 35 U/L
ANION GAP SERPL CALC-SCNC: 12 MMOL/L
AST SERPL-CCNC: 28 U/L
BASOPHILS # BLD AUTO: 0.03 K/UL
BASOPHILS NFR BLD AUTO: 0.4 %
BILIRUB SERPL-MCNC: 1.2 MG/DL
BUN SERPL-MCNC: 19 MG/DL
CALCIUM SERPL-MCNC: 9.6 MG/DL
CANCER AG19-9 SERPL-ACNC: 12 U/ML
CHLORIDE SERPL-SCNC: 102 MMOL/L
CHOLEST SERPL-MCNC: 190 MG/DL
CHOLEST/HDLC SERPL: 4.5 RATIO
CO2 SERPL-SCNC: 27 MMOL/L
CREAT SERPL-MCNC: 1 MG/DL
EOSINOPHIL # BLD AUTO: 0.18 K/UL
EOSINOPHIL NFR BLD AUTO: 2.5 %
ESTIMATED AVERAGE GLUCOSE: 100 MG/DL
GLUCOSE SERPL-MCNC: 141 MG/DL
HAV IGM SER QL: NONREACTIVE
HBA1C MFR BLD HPLC: 5.1 %
HBV CORE IGG+IGM SER QL: NONREACTIVE
HBV CORE IGM SER QL: NONREACTIVE
HBV SURFACE AB SER QL: REACTIVE
HBV SURFACE AG SER QL: NONREACTIVE
HCT VFR BLD CALC: 46.3 %
HCV AB SER QL: NONREACTIVE
HCV S/CO RATIO: 0.15 S/CO
HDLC SERPL-MCNC: 42 MG/DL
HEPATITIS A IGG ANTIBODY: REACTIVE
HGB BLD-MCNC: 15.6 G/DL
IMM GRANULOCYTES NFR BLD AUTO: 0.7 %
INR PPP: 0.95 RATIO
LDLC SERPL CALC-MCNC: 120 MG/DL
LYMPHOCYTES # BLD AUTO: 2.3 K/UL
LYMPHOCYTES NFR BLD AUTO: 31.6 %
MAN DIFF?: NORMAL
MCHC RBC-ENTMCNC: 31.3 PG
MCHC RBC-ENTMCNC: 33.7 GM/DL
MCV RBC AUTO: 92.8 FL
MONOCYTES # BLD AUTO: 0.51 K/UL
MONOCYTES NFR BLD AUTO: 7 %
NEUTROPHILS # BLD AUTO: 4.21 K/UL
NEUTROPHILS NFR BLD AUTO: 57.8 %
PLATELET # BLD AUTO: 288 K/UL
POTASSIUM SERPL-SCNC: 4.8 MMOL/L
PROT SERPL-MCNC: 7.3 G/DL
PT BLD: 11.3 SEC
RBC # BLD: 4.99 M/UL
RBC # FLD: 13.1 %
SODIUM SERPL-SCNC: 140 MMOL/L
TRIGL SERPL-MCNC: 142 MG/DL
WBC # FLD AUTO: 7.28 K/UL

## 2020-07-08 LAB — HEV AB SER QL: POSITIVE

## 2020-07-16 ENCOUNTER — APPOINTMENT (OUTPATIENT)
Dept: GASTROENTEROLOGY | Facility: CLINIC | Age: 70
End: 2020-07-16
Payer: MEDICARE

## 2020-07-16 DIAGNOSIS — R97.8 OTHER ABNORMAL TUMOR MARKERS: ICD-10-CM

## 2020-07-16 PROCEDURE — 99214 OFFICE O/P EST MOD 30 MIN: CPT | Mod: 95

## 2020-07-30 PROBLEM — R97.8 ELEVATED TUMOR MARKERS: Status: ACTIVE | Noted: 2020-07-19

## 2020-07-30 NOTE — PLAN
[FreeTextEntry1] : Impression:\par \par #1.  Acute pancreatitis, April 2020, may have been due to passed stone, no choledocholithiasis on MRCP or serial EUS.  Jaundice resolved.\par \par #2.  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  Resolved elevated CA 19-9\par \par Plan:\par \par #1. Telemed 6 months.\par \par #2.  MRI May/June 2021 for surveillance of pancreas.\par \par #3.  Followup with his other gastroenterologist for colon cancer screening.

## 2020-07-30 NOTE — HISTORY OF PRESENT ILLNESS
[FreeTextEntry1] : Telemedicine encounter was performed using dedicated computer program with audio and video communication.  Face-to-face time was 15   minutes and total encounter time was  25  min.  Over 50% of encounter time was spent in face-to-face discussion regarding assessment problems and coordination of care.\par

## 2020-08-03 ENCOUNTER — APPOINTMENT (OUTPATIENT)
Dept: INTERNAL MEDICINE | Facility: CLINIC | Age: 70
End: 2020-08-03
Payer: MEDICARE

## 2020-08-03 ENCOUNTER — NON-APPOINTMENT (OUTPATIENT)
Age: 70
End: 2020-08-03

## 2020-08-03 VITALS
BODY MASS INDEX: 31.01 KG/M2 | WEIGHT: 234 LBS | OXYGEN SATURATION: 97 % | HEART RATE: 79 BPM | HEIGHT: 73 IN | DIASTOLIC BLOOD PRESSURE: 80 MMHG | SYSTOLIC BLOOD PRESSURE: 140 MMHG | TEMPERATURE: 98.5 F

## 2020-08-03 VITALS — DIASTOLIC BLOOD PRESSURE: 68 MMHG | SYSTOLIC BLOOD PRESSURE: 130 MMHG

## 2020-08-03 DIAGNOSIS — M25.562 PAIN IN LEFT KNEE: ICD-10-CM

## 2020-08-03 DIAGNOSIS — Z01.84 ENCOUNTER FOR ANTIBODY RESPONSE EXAMINATION: ICD-10-CM

## 2020-08-03 PROCEDURE — 36415 COLL VENOUS BLD VENIPUNCTURE: CPT

## 2020-08-03 PROCEDURE — G0439: CPT

## 2020-08-03 PROCEDURE — 93000 ELECTROCARDIOGRAM COMPLETE: CPT

## 2020-08-03 NOTE — PLAN
[FreeTextEntry1] : Check routine labs as above.\par Discussed diet, exercise, and weight maintenance.\par Flu shot in the Fall. Pneumococcal vaccines UTD.\par Prostate cancer screening with PSA.\par He will repeat colonoscopy in 2021.\par Hypertension controlled. Continue losartan-HCTZ and metoprolol.\par Fatty liver - check LFT's. Follow-up with Dr. Domingo. \par F/u with Dr. Perlman for thyroid nodules.\par Start Voltaren gel for left knee pain. \par \par Return to office in 3 months for BP check and flu shot.

## 2020-08-03 NOTE — HEALTH RISK ASSESSMENT
[Very Good] : ~his/her~  mood as very good [No falls in past year] : Patient reported no falls in the past year [No] : In the past 12 months have you used drugs other than those required for medical reasons? No [0] : 2) Feeling down, depressed, or hopeless: Not at all (0) [Patient reported colonoscopy was normal] : Patient reported colonoscopy was normal [] : No [With Family] : lives with family [Retired] : retired [Sexually Active] : sexually active [] :  [# Of Children ___] : has [unfilled] children [Fully functional (using the telephone, shopping, preparing meals, housekeeping, doing laundry, using] : Fully functional and needs no help or supervision to perform IADLs (using the telephone, shopping, preparing meals, housekeeping, doing laundry, using transportation, managing medications and managing finances) [Reports changes in hearing] : Reports no changes in hearing [Fully functional (bathing, dressing, toileting, transferring, walking, feeding)] : Fully functional (bathing, dressing, toileting, transferring, walking, feeding) [Reports changes in vision] : Reports no changes in vision [Reports changes in dental health] : Reports no changes in dental health [Smoke Detector] : smoke detector [Carbon Monoxide Detector] : carbon monoxide detector [Sunscreen] : uses sunscreen [ColonoscopyDate] : 05/18 [Seat Belt] :  uses seat belt [ColonoscopyComments] : polyp, repeat 2021

## 2020-08-03 NOTE — PHYSICAL EXAM
[No Acute Distress] : no acute distress [Well Developed] : well developed [Normal Sclera/Conjunctiva] : normal sclera/conjunctiva [Well-Appearing] : well-appearing [EOMI] : extraocular movements intact [Normal Outer Ear/Nose] : the outer ears and nose were normal in appearance [PERRL] : pupils equal round and reactive to light [Normal Oropharynx] : the oropharynx was normal [Supple] : supple [Normal TMs] : both tympanic membranes were normal [No Lymphadenopathy] : no lymphadenopathy [Thyroid Normal, No Nodules] : the thyroid was normal and there were no nodules present [No Respiratory Distress] : no respiratory distress  [Clear to Auscultation] : lungs were clear to auscultation bilaterally [Regular Rhythm] : with a regular rhythm [Normal Rate] : normal rate  [Normal S1, S2] : normal S1 and S2 [No Edema] : there was no peripheral edema [No Murmur] : no murmur heard [Non Tender] : non-tender [Soft] : abdomen soft [Normal Posterior Cervical Nodes] : no posterior cervical lymphadenopathy [Normal Anterior Cervical Nodes] : no anterior cervical lymphadenopathy [No Rash] : no rash [Grossly Normal Strength/Tone] : grossly normal strength/tone [Coordination Grossly Intact] : coordination grossly intact [Normal Gait] : normal gait [Normal Mood] : the mood was normal [Normal Insight/Judgement] : insight and judgment were intact [Non-distended] : non-distended [No Focal Deficits] : no focal deficits [Normal Bowel Sounds] : normal bowel sounds [de-identified] : friendly [de-identified] : defer to GI

## 2020-08-03 NOTE — HISTORY OF PRESENT ILLNESS
[de-identified] : Patient comes for an annual exam.\par \par He was hospitalized at [facility] in 4/20 for acute pancreatitis. Had EUS for pancreatic cysts, biopsy negative for malignancy. Now feeling better. He is not drinking alcohol.

## 2020-08-04 ENCOUNTER — TRANSCRIPTION ENCOUNTER (OUTPATIENT)
Age: 70
End: 2020-08-04

## 2020-08-04 LAB
25(OH)D3 SERPL-MCNC: 38.5 NG/ML
ALBUMIN SERPL ELPH-MCNC: 4.7 G/DL
ALP BLD-CCNC: 45 U/L
ALT SERPL-CCNC: 33 U/L
ANION GAP SERPL CALC-SCNC: 15 MMOL/L
APPEARANCE: CLEAR
AST SERPL-CCNC: 30 U/L
BACTERIA: NEGATIVE
BASOPHILS # BLD AUTO: 0.04 K/UL
BASOPHILS NFR BLD AUTO: 0.6 %
BILIRUB SERPL-MCNC: 2.4 MG/DL
BILIRUBIN URINE: NEGATIVE
BLOOD URINE: NEGATIVE
BUN SERPL-MCNC: 20 MG/DL
CALCIUM SERPL-MCNC: 9.5 MG/DL
CHLORIDE SERPL-SCNC: 101 MMOL/L
CHOLEST SERPL-MCNC: 188 MG/DL
CHOLEST/HDLC SERPL: 4.4 RATIO
CO2 SERPL-SCNC: 22 MMOL/L
COLOR: NORMAL
CREAT SERPL-MCNC: 1.03 MG/DL
EOSINOPHIL # BLD AUTO: 0.18 K/UL
EOSINOPHIL NFR BLD AUTO: 2.8 %
ESTIMATED AVERAGE GLUCOSE: 100 MG/DL
GLUCOSE QUALITATIVE U: NEGATIVE
GLUCOSE SERPL-MCNC: 122 MG/DL
HBA1C MFR BLD HPLC: 5.1 %
HCT VFR BLD CALC: 46 %
HDLC SERPL-MCNC: 43 MG/DL
HGB BLD-MCNC: 15.3 G/DL
HYALINE CASTS: 0 /LPF
IMM GRANULOCYTES NFR BLD AUTO: 0.8 %
KETONES URINE: NEGATIVE
LDLC SERPL CALC-MCNC: 130 MG/DL
LEUKOCYTE ESTERASE URINE: NEGATIVE
LYMPHOCYTES # BLD AUTO: 2.35 K/UL
LYMPHOCYTES NFR BLD AUTO: 36.8 %
MAN DIFF?: NORMAL
MCHC RBC-ENTMCNC: 30.9 PG
MCHC RBC-ENTMCNC: 33.3 GM/DL
MCV RBC AUTO: 92.9 FL
MICROSCOPIC-UA: NORMAL
MONOCYTES # BLD AUTO: 0.47 K/UL
MONOCYTES NFR BLD AUTO: 7.4 %
NEUTROPHILS # BLD AUTO: 3.29 K/UL
NEUTROPHILS NFR BLD AUTO: 51.6 %
NITRITE URINE: NEGATIVE
PH URINE: 6
PLATELET # BLD AUTO: 278 K/UL
POTASSIUM SERPL-SCNC: 4 MMOL/L
PROT SERPL-MCNC: 7.2 G/DL
PROTEIN URINE: NEGATIVE
PSA SERPL-MCNC: 1 NG/ML
RBC # BLD: 4.95 M/UL
RBC # FLD: 13.4 %
RED BLOOD CELLS URINE: 0 /HPF
SARS-COV-2 IGG SERPL IA-ACNC: 0.09 INDEX
SARS-COV-2 IGG SERPL QL IA: NEGATIVE
SODIUM SERPL-SCNC: 139 MMOL/L
SPECIFIC GRAVITY URINE: 1.01
SQUAMOUS EPITHELIAL CELLS: 0 /HPF
TRIGL SERPL-MCNC: 80 MG/DL
TSH SERPL-ACNC: 2.5 UIU/ML
UROBILINOGEN URINE: NORMAL
VIT B12 SERPL-MCNC: 530 PG/ML
WBC # FLD AUTO: 6.38 K/UL
WHITE BLOOD CELLS URINE: 0 /HPF

## 2020-09-23 ENCOUNTER — APPOINTMENT (OUTPATIENT)
Dept: HEPATOLOGY | Facility: CLINIC | Age: 70
End: 2020-09-23
Payer: MEDICARE

## 2020-09-23 VITALS
HEART RATE: 78 BPM | DIASTOLIC BLOOD PRESSURE: 80 MMHG | WEIGHT: 233 LBS | BODY MASS INDEX: 30.88 KG/M2 | SYSTOLIC BLOOD PRESSURE: 145 MMHG | TEMPERATURE: 98.3 F | OXYGEN SATURATION: 97 % | HEIGHT: 73 IN | RESPIRATION RATE: 15 BRPM

## 2020-09-23 DIAGNOSIS — R74.8 ABNORMAL LEVELS OF OTHER SERUM ENZYMES: ICD-10-CM

## 2020-09-23 DIAGNOSIS — B15.9 HEPATITIS A W/OUT HEPATIC COMA: ICD-10-CM

## 2020-09-23 DIAGNOSIS — E80.4 GILBERT SYNDROME: ICD-10-CM

## 2020-09-23 PROCEDURE — 36415 COLL VENOUS BLD VENIPUNCTURE: CPT

## 2020-09-23 PROCEDURE — 99214 OFFICE O/P EST MOD 30 MIN: CPT

## 2020-09-23 NOTE — ASSESSMENT
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  He does have fatty liver and has been advised to continue to monitor body weight.  The patient does not drink alcohol.  The patient does have a pancreatic cystic lesion which was currently been monitored.  I've obtain repeat laboratory tests today and is aminotransferase values remain normal.  His acute hepatitis episode from March likely related to his travel to [place] has completely resolved.  I have not made a followup appointment with what would be available to him.  Should abnormalities arise in the future.

## 2020-09-23 NOTE — PHYSICAL EXAM
[Scleral Icterus] : No Scleral Icterus [Hepatojugular Reflux] : patient did not have a sustained hepatojugular reflux [Spider Angioma] : No spider angioma(s) were observed [Ascites Fluid Wave] : no ascites fluid wave [Ascites Tense] : ascites is not tense [Splenomegaly] : no splenomegaly [Liver Size (___ Cm)] : Liver size [unfilled] cm [Asterixis] : no asterixis observed [Jaundice] : No jaundice [Palmar Erythema] : no Palmar Erythema [General Appearance - Alert] : alert [General Appearance - In No Acute Distress] : in no acute distress [Sclera] : the sclera and conjunctiva were normal [PERRL With Normal Accommodation] : pupils were equal in size, round, and reactive to light [Extraocular Movements] : extraocular movements were intact [Outer Ear] : the ears and nose were normal in appearance [Oropharynx] : the oropharynx was normal [Neck Appearance] : the appearance of the neck was normal [Neck Cervical Mass (___cm)] : no neck mass was observed [Jugular Venous Distention Increased] : there was no jugular-venous distention [Thyroid Diffuse Enlargement] : the thyroid was not enlarged [Thyroid Nodule] : there were no palpable thyroid nodules [Respiration, Rhythm And Depth] : normal respiratory rhythm and effort [Exaggerated Use Of Accessory Muscles For Inspiration] : no accessory muscle use [Heart Rate And Rhythm] : heart rate was normal and rhythm regular [Edema] : there was no peripheral edema [Bowel Sounds] : normal bowel sounds [Abdomen Soft] : soft [Abdomen Tenderness] : non-tender [Abdomen Mass (___ Cm)] : no abdominal mass palpated [Supraclavicular Lymph Nodes Enlarged Bilaterally] : supraclavicular [Nail Clubbing] : no clubbing  or cyanosis of the fingernails [Involuntary Movements] : no involuntary movements were seen [Skin Color & Pigmentation] : normal skin color and pigmentation [Skin Turgor] : normal skin turgor [] : no rash [No Focal Deficits] : no focal deficits [Oriented To Time, Place, And Person] : oriented to person, place, and time

## 2020-09-23 NOTE — HISTORY OF PRESENT ILLNESS
[Moderate] : moderate in severity [Unchanged] : unchanged [Fatigue] : denies fatigue [Malaise] : denies malaise [Fever] : denies fever [Diffuse Joint Pain (Arthralgias)] : denies arthralgias [Muscle Aches, Generalized (Myalgias)] : denies myalgias [Yellow Skin Or Eyes (Jaundice)] : jaundice resolved [Abdominal Pain] : denies abdominal pain [Urine Tests Nonspecific Abnormal Findings Biliuria] : denies dark urine [Light Colored Bowel Movement (Acholic Stools)] : denies pale stools [Insomnia] : denies insomnia [Skin: Rash] : denies rash [Itching (Pruritus)] : denies pruritus [Shortness Of Breath] : denies shortness of breath [Wt Gain ___ Lbs] : no recent weight gain [Wt Loss ___ Lbs] : no recent weight loss [de-identified] : This patient was initially seen in 2016 for elevated bilirubin with a diagnosis of Gilbert's syndrome with persisting indirect hyperbilirubinemia.  The patient was again evaluated in March of 2020 with jaundice and marked elevation of aminotransferase years following a return from [place].  The etiology of that acute hepatitis event is unclear.  The event, resolved.  The patient had prior antibodies to hepatitis A and hepatitis B.  The patient does have antibodies to hepatitis E which could have contributed.  This episode has resolved, and all biochemical liver tests have returned to normal as of August of 2020.  The patient generally feels well.  He has lost about 20 pounds over the past several years.\par \par The patient has been identified as having fatty liver on abdominal ultrasound and has had a pancreatic cystic abnormality for which he is followed with MRIs.  The patient is aware of his followup schedule.\par \par The patient is immune to hepatitis A and hepatitis B and has stopped.  Alcohol intake and monitors his weight.

## 2020-09-24 LAB
ALBUMIN SERPL ELPH-MCNC: 4.6 G/DL
ALP BLD-CCNC: 54 U/L
ALT SERPL-CCNC: 33 U/L
ANION GAP SERPL CALC-SCNC: 11 MMOL/L
AST SERPL-CCNC: 29 U/L
BILIRUB DIRECT SERPL-MCNC: 0.4 MG/DL
BILIRUB SERPL-MCNC: 2 MG/DL
BUN SERPL-MCNC: 18 MG/DL
CALCIUM SERPL-MCNC: 9.3 MG/DL
CHLORIDE SERPL-SCNC: 100 MMOL/L
CHOLEST SERPL-MCNC: 159 MG/DL
CHOLEST/HDLC SERPL: 3.9 RATIO
CO2 SERPL-SCNC: 26 MMOL/L
CREAT SERPL-MCNC: 1.05 MG/DL
ESTIMATED AVERAGE GLUCOSE: 103 MG/DL
GLUCOSE SERPL-MCNC: 117 MG/DL
HBA1C MFR BLD HPLC: 5.2 %
HDLC SERPL-MCNC: 41 MG/DL
LDLC SERPL CALC-MCNC: 105 MG/DL
POTASSIUM SERPL-SCNC: 3.7 MMOL/L
PROT SERPL-MCNC: 7 G/DL
SODIUM SERPL-SCNC: 137 MMOL/L
TRIGL SERPL-MCNC: 69 MG/DL

## 2020-11-16 ENCOUNTER — APPOINTMENT (OUTPATIENT)
Dept: INTERNAL MEDICINE | Facility: CLINIC | Age: 70
End: 2020-11-16
Payer: MEDICARE

## 2020-11-16 VITALS
WEIGHT: 236 LBS | OXYGEN SATURATION: 99 % | HEART RATE: 82 BPM | TEMPERATURE: 98 F | SYSTOLIC BLOOD PRESSURE: 130 MMHG | DIASTOLIC BLOOD PRESSURE: 88 MMHG | BODY MASS INDEX: 31.28 KG/M2 | HEIGHT: 73 IN

## 2020-11-16 VITALS — DIASTOLIC BLOOD PRESSURE: 70 MMHG | SYSTOLIC BLOOD PRESSURE: 130 MMHG

## 2020-11-16 DIAGNOSIS — Z86.718 PERSONAL HISTORY OF OTHER VENOUS THROMBOSIS AND EMBOLISM: ICD-10-CM
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
## 2020-11-16 PROCEDURE — 36415 COLL VENOUS BLD VENIPUNCTURE: CPT

## 2020-11-16 PROCEDURE — 99214 OFFICE O/P EST MOD 30 MIN: CPT | Mod: 25

## 2020-11-16 RX ORDER — DOXYCYCLINE 100 MG/1
100 CAPSULE ORAL
Qty: 2 | Refills: 0 | Status: DISCONTINUED | COMMUNITY
Start: 2020-05-29

## 2020-11-16 RX ORDER — DICLOFENAC SODIUM 10 MG/G
1 GEL TOPICAL TWICE DAILY
Qty: 1 | Refills: 3 | Status: DISCONTINUED | COMMUNITY
Start: 2020-08-03 | End: 2020-11-16

## 2020-11-16 NOTE — PLAN
[FreeTextEntry1] : Hypertension is controlled. Continue losartan-HCTZ and metoprolol.\par Check LFT's. Has ALBERT and Kaleb's. Followed with Dr. Domingo.\par Recent exposure to ticks, unlikely was attached. Check tick disease panel.\par Check COVID antibody test.\par \par Return to office in 6 months for BP check.

## 2020-11-16 NOTE — HISTORY OF PRESENT ILLNESS
[FreeTextEntry1] : HTN, fatty liver [de-identified] : Patient comes for follow-up.\par \par He reports feeling well. Has no complaints. \par He found multiple ticks crawling on him after hiking in the woods last month in [facility]. He does not think any of the ticks were attached to him. He did have tick attached back in May and was treated prophylactically with Doxycycline. \par \par \par

## 2020-11-16 NOTE — PHYSICAL EXAM
[No Acute Distress] : no acute distress [Well Developed] : well developed [Well-Appearing] : well-appearing [Normal Sclera/Conjunctiva] : normal sclera/conjunctiva [PERRL] : pupils equal round and reactive to light [EOMI] : extraocular movements intact [Normal Outer Ear/Nose] : the outer ears and nose were normal in appearance [Normal Oropharynx] : the oropharynx was normal [Normal TMs] : both tympanic membranes were normal [Supple] : supple [No Lymphadenopathy] : no lymphadenopathy [Thyroid Normal, No Nodules] : the thyroid was normal and there were no nodules present [No Respiratory Distress] : no respiratory distress  [Clear to Auscultation] : lungs were clear to auscultation bilaterally [Normal Rate] : normal rate  [Regular Rhythm] : with a regular rhythm [Normal S1, S2] : normal S1 and S2 [No Murmur] : no murmur heard [No Edema] : there was no peripheral edema [Soft] : abdomen soft [Non Tender] : non-tender [No Masses] : no abdominal mass palpated [Normal Posterior Cervical Nodes] : no posterior cervical lymphadenopathy [Normal Anterior Cervical Nodes] : no anterior cervical lymphadenopathy [Grossly Normal Strength/Tone] : grossly normal strength/tone [No Rash] : no rash [Normal Gait] : normal gait [Coordination Grossly Intact] : coordination grossly intact [Normal Mood] : the mood was normal [Normal Insight/Judgement] : insight and judgment were intact [de-identified] : friendly

## 2020-11-17 ENCOUNTER — TRANSCRIPTION ENCOUNTER (OUTPATIENT)
Age: 70
End: 2020-11-17

## 2020-11-17 LAB
ALBUMIN SERPL ELPH-MCNC: 4.8 G/DL
ALP BLD-CCNC: 55 U/L
ALT SERPL-CCNC: 34 U/L
ANION GAP SERPL CALC-SCNC: 11 MMOL/L
AST SERPL-CCNC: 29 U/L
BILIRUB SERPL-MCNC: 2.2 MG/DL
BUN SERPL-MCNC: 18 MG/DL
CALCIUM SERPL-MCNC: 9.8 MG/DL
CHLORIDE SERPL-SCNC: 99 MMOL/L
CO2 SERPL-SCNC: 25 MMOL/L
CREAT SERPL-MCNC: 1.09 MG/DL
GLUCOSE SERPL-MCNC: 109 MG/DL
POTASSIUM SERPL-SCNC: 4.1 MMOL/L
PROT SERPL-MCNC: 7.2 G/DL
SARS-COV-2 IGG SERPL IA-ACNC: 0.11 INDEX
SARS-COV-2 IGG SERPL QL IA: NEGATIVE
SODIUM SERPL-SCNC: 135 MMOL/L

## 2020-11-18 ENCOUNTER — TRANSCRIPTION ENCOUNTER (OUTPATIENT)
Age: 70
End: 2020-11-18

## 2020-11-18 LAB
A PHAGOCYTOPH IGG TITR SER IF: NORMAL TITER
B BURGDOR AB SER QL IA: NEGATIVE
B MICROTI IGG TITR SER: NORMAL TITER
E CHAFFEENSIS IGG TITR SER IF: NORMAL TITER

## 2020-12-17 ENCOUNTER — RX RENEWAL (OUTPATIENT)
Age: 70
End: 2020-12-17

## 2021-02-09 ENCOUNTER — APPOINTMENT (OUTPATIENT)
Dept: GASTROENTEROLOGY | Facility: CLINIC | Age: 71
End: 2021-02-09
Payer: MEDICARE

## 2021-02-09 PROCEDURE — 99213 OFFICE O/P EST LOW 20 MIN: CPT | Mod: 95

## 2021-02-15 NOTE — HISTORY OF PRESENT ILLNESS
[FreeTextEntry1] : Verbal consent obtained for telemedicine encounter.  Telemedicine encounter was performed using dedicated computer program with audio and video communication.  Face-to-face time was 15  minutes and total encounter time was 25  min.  Over 50% of encounter time was spent in face-to-face discussion regarding assessment problems and coordination of care.\par

## 2021-02-15 NOTE — PLAN
[FreeTextEntry1] : Impression:\par \par #1. Acute pancreatitis, April 2020, may have been due to passed stone, no choledocholithiasis on MRCP or serial EUS. Jaundice resolved.\par \par #2. 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 Resolved elevated CA 19-9\par \par Plan\par \par #1.  MRI/MRCP May/June 2021 for surveillance of pancreas cysts\par \par #2.  Telemedicine July 2021

## 2021-05-17 ENCOUNTER — APPOINTMENT (OUTPATIENT)
Dept: INTERNAL MEDICINE | Facility: CLINIC | Age: 71
End: 2021-05-17
Payer: MEDICARE

## 2021-05-17 VITALS
DIASTOLIC BLOOD PRESSURE: 70 MMHG | WEIGHT: 243 LBS | HEART RATE: 77 BPM | HEIGHT: 73 IN | SYSTOLIC BLOOD PRESSURE: 140 MMHG | OXYGEN SATURATION: 98 % | BODY MASS INDEX: 32.2 KG/M2 | TEMPERATURE: 98.4 F

## 2021-05-17 VITALS — SYSTOLIC BLOOD PRESSURE: 134 MMHG | DIASTOLIC BLOOD PRESSURE: 70 MMHG

## 2021-05-17 DIAGNOSIS — L30.9 DERMATITIS, UNSPECIFIED: ICD-10-CM

## 2021-05-17 PROCEDURE — 99214 OFFICE O/P EST MOD 30 MIN: CPT

## 2021-05-17 NOTE — PHYSICAL EXAM
[No Acute Distress] : no acute distress [Well Developed] : well developed [Well-Appearing] : well-appearing [Normal Sclera/Conjunctiva] : normal sclera/conjunctiva [PERRL] : pupils equal round and reactive to light [EOMI] : extraocular movements intact [Normal Outer Ear/Nose] : the outer ears and nose were normal in appearance [Normal Oropharynx] : the oropharynx was normal [Normal TMs] : both tympanic membranes were normal [Supple] : supple [No Lymphadenopathy] : no lymphadenopathy [Thyroid Normal, No Nodules] : the thyroid was normal and there were no nodules present [No Respiratory Distress] : no respiratory distress  [Clear to Auscultation] : lungs were clear to auscultation bilaterally [Normal Rate] : normal rate  [Regular Rhythm] : with a regular rhythm [Normal S1, S2] : normal S1 and S2 [No Murmur] : no murmur heard [No Edema] : there was no peripheral edema [Soft] : abdomen soft [Non Tender] : non-tender [No Masses] : no abdominal mass palpated [Normal Posterior Cervical Nodes] : no posterior cervical lymphadenopathy [Normal Anterior Cervical Nodes] : no anterior cervical lymphadenopathy [Grossly Normal Strength/Tone] : grossly normal strength/tone [Normal Gait] : normal gait [Coordination Grossly Intact] : coordination grossly intact [Normal Mood] : the mood was normal [Normal Insight/Judgement] : insight and judgment were intact [de-identified] : friendly [de-identified] : scattered erythematous rash on face and hands

## 2021-05-17 NOTE — PLAN
[FreeTextEntry1] : Hypertension is controlled. Continue losartan, HCTZ, and metoprolol.\par Has PRICE and Gilbert's. Followed with Dr. Domingo. Check LFT's at next visit.\par Followed with dermatologist for dermatitis of face / hands. On Fluorouracil with good results. \par \par Return to office in 3 months for physical.

## 2021-05-17 NOTE — HISTORY OF PRESENT ILLNESS
[FreeTextEntry1] : HTN / BP  check [de-identified] : Patient comes for 6 month follow-up and BP check.\par \par He reports feeling well. Has no complaints. \par He received COVID vaccine x 2.\par He is scheduled for colonoscopy with Dr. Bentley next month.\par Also scheduled for MRI abdomen next month for follow-up of pancreatic cysts from 4/20. \par He plays tennis 3x a week. \par \par

## 2021-06-27 ENCOUNTER — OUTPATIENT (OUTPATIENT)
Dept: OUTPATIENT SERVICES | Facility: HOSPITAL | Age: 71
LOS: 1 days | End: 2021-06-27
Payer: MEDICARE

## 2021-06-27 ENCOUNTER — APPOINTMENT (OUTPATIENT)
Dept: MRI IMAGING | Facility: CLINIC | Age: 71
End: 2021-06-27

## 2021-06-27 DIAGNOSIS — K86.2 CYST OF PANCREAS: ICD-10-CM

## 2021-06-27 PROCEDURE — A9585: CPT

## 2021-06-27 PROCEDURE — 74183 MRI ABD W/O CNTR FLWD CNTR: CPT | Mod: 26,ME

## 2021-06-27 PROCEDURE — G1004: CPT

## 2021-06-27 PROCEDURE — 74183 MRI ABD W/O CNTR FLWD CNTR: CPT

## 2021-07-21 ENCOUNTER — RX RENEWAL (OUTPATIENT)
Age: 71
End: 2021-07-21

## 2021-08-17 ENCOUNTER — APPOINTMENT (OUTPATIENT)
Dept: INTERNAL MEDICINE | Facility: CLINIC | Age: 71
End: 2021-08-17
Payer: MEDICARE

## 2021-08-17 ENCOUNTER — NON-APPOINTMENT (OUTPATIENT)
Age: 71
End: 2021-08-17

## 2021-08-17 VITALS
OXYGEN SATURATION: 96 % | SYSTOLIC BLOOD PRESSURE: 140 MMHG | HEART RATE: 76 BPM | TEMPERATURE: 98.7 F | DIASTOLIC BLOOD PRESSURE: 80 MMHG | WEIGHT: 242 LBS | HEIGHT: 73 IN | BODY MASS INDEX: 32.07 KG/M2

## 2021-08-17 VITALS — SYSTOLIC BLOOD PRESSURE: 126 MMHG | DIASTOLIC BLOOD PRESSURE: 78 MMHG

## 2021-08-17 PROCEDURE — 36415 COLL VENOUS BLD VENIPUNCTURE: CPT

## 2021-08-17 PROCEDURE — 93000 ELECTROCARDIOGRAM COMPLETE: CPT

## 2021-08-17 PROCEDURE — G0439: CPT

## 2021-08-17 NOTE — HISTORY OF PRESENT ILLNESS
[de-identified] : Patient comes for an annual exam.\par \par He reports feeling well.\par Has occasional queasiness and mild abdominal discomfort. He enjoys spice. Colonoscopy in 6/21 showed benign polyps.\par He sees dermatologist every 3-4 months, takes Fluorouracil cream. He had skin cancer behind left ear, s/p Moh's 4 yr ago. \par \par

## 2021-08-17 NOTE — PLAN
[FreeTextEntry1] : Check routine fasting labs as above.\par Discussed diet, exercise, and weight maintenance. Exercises often with tennis.\par Flu shot in the Fall. Pneumococcal vaccines UTD. Received COVID vaccine x 2.\par Prostate cancer screening with PSA.\par Colonoscopy 6/21 with polyps, repeat in 2024 with Dr. Bentley. \par Hypertension controlled. Continue losartan-HCTZ and metoprolol.\par Fatty liver - check LFT's. Follow-up with Dr. Domingo. \par F/u with Dr. Perlman for thyroid nodules.\par Suspect has GERD. Start Pepcid 40 mg QAM. GERD diet precautions discussed, reduce spice.\par Referred to allergist Dr. Boxer for allergic rhinitis.\par \par Return to office in 6 months for BP check and fasting labs.

## 2021-08-17 NOTE — PHYSICAL EXAM
[No Acute Distress] : no acute distress [Well Developed] : well developed [Well-Appearing] : well-appearing [Normal Sclera/Conjunctiva] : normal sclera/conjunctiva [PERRL] : pupils equal round and reactive to light [EOMI] : extraocular movements intact [Normal Outer Ear/Nose] : the outer ears and nose were normal in appearance [Normal Oropharynx] : the oropharynx was normal [Normal TMs] : both tympanic membranes were normal [Supple] : supple [No Lymphadenopathy] : no lymphadenopathy [Thyroid Normal, No Nodules] : the thyroid was normal and there were no nodules present [No Respiratory Distress] : no respiratory distress  [Clear to Auscultation] : lungs were clear to auscultation bilaterally [Normal Rate] : normal rate  [Normal S1, S2] : normal S1 and S2 [Regular Rhythm] : with a regular rhythm [No Murmur] : no murmur heard [No Edema] : there was no peripheral edema [Soft] : abdomen soft [Non Tender] : non-tender [Non-distended] : non-distended [Normal Bowel Sounds] : normal bowel sounds [Normal Posterior Cervical Nodes] : no posterior cervical lymphadenopathy [Normal Anterior Cervical Nodes] : no anterior cervical lymphadenopathy [Grossly Normal Strength/Tone] : grossly normal strength/tone [No Rash] : no rash [Normal Gait] : normal gait [Coordination Grossly Intact] : coordination grossly intact [No Focal Deficits] : no focal deficits [Normal Mood] : the mood was normal [Normal Insight/Judgement] : insight and judgment were intact [de-identified] : friendly [de-identified] : defer to GI

## 2021-08-17 NOTE — HEALTH RISK ASSESSMENT
[Very Good] : ~his/her~  mood as very good [No] : In the past 12 months have you used drugs other than those required for medical reasons? No [No falls in past year] : Patient reported no falls in the past year [0] : 1) Little interest or pleasure doing things: Not at all (0) [Patient reported colonoscopy was normal] : Patient reported colonoscopy was normal [With Family] : lives with family [Retired] : retired [] :  [# Of Children ___] : has [unfilled] children [Fully functional (bathing, dressing, toileting, transferring, walking, feeding)] : Fully functional (bathing, dressing, toileting, transferring, walking, feeding) [Sexually Active] : sexually active [Fully functional (using the telephone, shopping, preparing meals, housekeeping, doing laundry, using] : Fully functional and needs no help or supervision to perform IADLs (using the telephone, shopping, preparing meals, housekeeping, doing laundry, using transportation, managing medications and managing finances) [Smoke Detector] : smoke detector [Carbon Monoxide Detector] : carbon monoxide detector [Seat Belt] :  uses seat belt [Sunscreen] : uses sunscreen [] : No [PHQ-2 Negative - No further assessment needed] : PHQ-2 Negative - No further assessment needed [de-identified] : tennis 3x a week  [Reports changes in hearing] : Reports no changes in hearing [Reports changes in vision] : Reports no changes in vision [Reports changes in dental health] : Reports no changes in dental health [ColonoscopyDate] : 06/21 [ColonoscopyComments] : polyp, repeat 2024; Dr. Bentley

## 2021-08-18 ENCOUNTER — TRANSCRIPTION ENCOUNTER (OUTPATIENT)
Age: 71
End: 2021-08-18

## 2021-08-18 LAB
25(OH)D3 SERPL-MCNC: 39.1 NG/ML
ALBUMIN SERPL ELPH-MCNC: 4.8 G/DL
ALP BLD-CCNC: 67 U/L
ALT SERPL-CCNC: 39 U/L
ANION GAP SERPL CALC-SCNC: 15 MMOL/L
APPEARANCE: CLEAR
AST SERPL-CCNC: 33 U/L
BACTERIA: NEGATIVE
BASOPHILS # BLD AUTO: 0.02 K/UL
BASOPHILS NFR BLD AUTO: 0.2 %
BILIRUB SERPL-MCNC: 2.2 MG/DL
BILIRUBIN URINE: NEGATIVE
BLOOD URINE: NEGATIVE
BUN SERPL-MCNC: 16 MG/DL
CALCIUM SERPL-MCNC: 10.1 MG/DL
CHLORIDE SERPL-SCNC: 100 MMOL/L
CHOLEST SERPL-MCNC: 170 MG/DL
CO2 SERPL-SCNC: 26 MMOL/L
COLOR: YELLOW
COVID-19 SPIKE DOMAIN ANTIBODY INTERPRETATION: POSITIVE
CREAT SERPL-MCNC: 1.1 MG/DL
EOSINOPHIL # BLD AUTO: 0.15 K/UL
EOSINOPHIL NFR BLD AUTO: 1.8 %
ESTIMATED AVERAGE GLUCOSE: 103 MG/DL
GLUCOSE QUALITATIVE U: NEGATIVE
GLUCOSE SERPL-MCNC: 119 MG/DL
HBA1C MFR BLD HPLC: 5.2 %
HCT VFR BLD CALC: 46.7 %
HDLC SERPL-MCNC: 36 MG/DL
HGB BLD-MCNC: 15.4 G/DL
HYALINE CASTS: 2 /LPF
IMM GRANULOCYTES NFR BLD AUTO: 0.4 %
KETONES URINE: NEGATIVE
LDLC SERPL CALC-MCNC: 104 MG/DL
LEUKOCYTE ESTERASE URINE: NEGATIVE
LYMPHOCYTES # BLD AUTO: 1.98 K/UL
LYMPHOCYTES NFR BLD AUTO: 23.5 %
MAN DIFF?: NORMAL
MCHC RBC-ENTMCNC: 30.7 PG
MCHC RBC-ENTMCNC: 33 GM/DL
MCV RBC AUTO: 93 FL
MICROSCOPIC-UA: NORMAL
MONOCYTES # BLD AUTO: 0.63 K/UL
MONOCYTES NFR BLD AUTO: 7.5 %
NEUTROPHILS # BLD AUTO: 5.62 K/UL
NEUTROPHILS NFR BLD AUTO: 66.6 %
NITRITE URINE: NEGATIVE
NONHDLC SERPL-MCNC: 134 MG/DL
PH URINE: 7
PLATELET # BLD AUTO: 210 K/UL
POTASSIUM SERPL-SCNC: 4.9 MMOL/L
PROT SERPL-MCNC: 7.4 G/DL
PROTEIN URINE: ABNORMAL
PSA SERPL-MCNC: 1.23 NG/ML
RBC # BLD: 5.02 M/UL
RBC # FLD: 13.4 %
RED BLOOD CELLS URINE: 2 /HPF
SARS-COV-2 AB SERPL IA-ACNC: >250 U/ML
SODIUM SERPL-SCNC: 141 MMOL/L
SPECIFIC GRAVITY URINE: 1.03
SQUAMOUS EPITHELIAL CELLS: 2 /HPF
TRIGL SERPL-MCNC: 151 MG/DL
TSH SERPL-ACNC: 3.96 UIU/ML
UROBILINOGEN URINE: NORMAL
VIT B12 SERPL-MCNC: 666 PG/ML
WBC # FLD AUTO: 8.43 K/UL
WHITE BLOOD CELLS URINE: 1 /HPF

## 2021-08-22 ENCOUNTER — RX RENEWAL (OUTPATIENT)
Age: 71
End: 2021-08-22

## 2021-10-06 ENCOUNTER — TRANSCRIPTION ENCOUNTER (OUTPATIENT)
Age: 71
End: 2021-10-06

## 2021-10-12 ENCOUNTER — APPOINTMENT (OUTPATIENT)
Dept: ALLERGY | Facility: CLINIC | Age: 71
End: 2021-10-12
Payer: MEDICARE

## 2021-10-12 VITALS
SYSTOLIC BLOOD PRESSURE: 150 MMHG | OXYGEN SATURATION: 96 % | HEART RATE: 85 BPM | DIASTOLIC BLOOD PRESSURE: 76 MMHG | HEIGHT: 73 IN

## 2021-10-12 DIAGNOSIS — J30.9 ALLERGIC RHINITIS, UNSPECIFIED: ICD-10-CM

## 2021-10-12 PROCEDURE — 99203 OFFICE O/P NEW LOW 30 MIN: CPT

## 2021-10-31 NOTE — REVIEW OF SYSTEMS
[address] ENCOUNTER      Pt Name: Tiarra Houser  MRN: [id number]  [place] 1989  Date of evaluation: 10/31/2021  Provider: Trupti Duong MD    CHIEF COMPLAINT       Chief Complaint   Patient presents with    Finger Pain     L 5th finger pain. HISTORY OF PRESENT ILLNESS   (Location/Symptom, Timing/Onset,Context/Setting, Quality, Duration, Modifying Factors, Severity)  Note limiting factors. Tiarra Huoser is a 29 y.o. female who presents to the emergency department c/o L 5th finger pain x 1 week. Onset gradually worsening after tip of L 5th finger bitten off 1 week ago while pt was in [facility], had 3 week follow up but needed to come back to [place]. Reports compliance with abx, but unsure the name of the antibiotic she is taking. Pain localized to the traumatic/revision amputation site and associated with scant amt of purulent drainage. Pain worse with touching the area, worst along the lateral edge of the finger. Symptoms not otherwise alleviated or exacerbated by other factors. NursingNotes were reviewed. REVIEW OF SYSTEMS    (2-9 systems for level 4, 10 or more for level 5)       Constitutional: No fever or chills. Eye: No visual disturbances. No eye pain. Ear/Nose/Mouth/Throat: No nasal congestion. No sore throat. Respiratory: No cough, No shortness of breath, No sputum production. Cardiovascular: No chest pain. No palpitations. Gastrointestinal: No abdominal pain. No nausea or vomiting  Genitourinary: No dysuria. No hematuria. Hematology/Lymphatics: No bleeding or bruising tendency. Immunologic: No malaise. No swollen glands. Musculoskeletal: No back pain. No joint pain. Finger pain as in HPI. Integumentary: No rash. No abrasions. Neurologic: No headache. No focal numbness or weakness. PAST MEDICAL HISTORY   History reviewed. No pertinent past medical history. SURGICALHISTORY     History reviewed.  No PHYSICAL EXAM    (up to 7 for level 4, 8 or more for level 5)     Vitals:    10/31/21 1921   BP: 123/74   Pulse: 67   Resp:  19   Temp:  98.3 Fahrenheit   SpO2:  100% on room air         General: Alert and oriented appropriately for age, No acute distress. Eye: Normal conjunctiva. Pupils equal and reactive. HENT: Oral mucosa is moist.  Respiratory: Respirations even and non-labored. Cardiovascular: Normal rate, Regular rhythm. Gastrointestinal: Soft, Non-tender, Non-distended. Musculoskeletal: Traumatic amputation to the left fifth distal phalanx with revision amputation changes, loosely approximated with nylon sutures, small amt soft tissue swelling and associated darkening/erythema to the distal soft tissues, scant amt of purulence expressed from the rev amp site. Ranges DIPJ without difficulty, cap refill wnl. Integumentary: See MSK exam  Neurologic: Alert and appropriate for age. No focal deficits. Psychiatric: Cooperative. DIAGNOSTIC RESULTS         RADIOLOGY:   Non-plain filmimages such as CT, Ultrasound and MRI are read by the radiologist. Plain radiographic images are visualized and preliminarily interpreted by the emergency physician with the below findings:      Interpretation per the Radiologist below, if available at the time ofthis note:    XR FINGER LEFT (MIN 2 VIEWS)    (Results Pending)   Traumatic amputation with likely revision amputation of the L 5th finger without significant bony change for plain film evidence of osteomyelitis.         EMERGENCY DEPARTMENT COURSE and DIFFERENTIAL DIAGNOSIS/MDM:   Vitals:    Vitals:    10/31/21 1858 10/31/21 1921   BP:  123/74   Pulse: 73 67   Resp: 19    Temp: 98.3 °F (36.8 °C)    TempSrc: Oral    SpO2: 100%    Weight: 194 lb 3.6 oz (88.1 kg)    Height: 5' 3\" (1.6 m)          Medical decision making:  Jason Parikh at [facility] is a 30 yo F w/ PMHx of smoking who p/w L 5th finger infected traumatic amputation stump after the distal tuft bitten off 1 week ago while she was in [facility], reports compliance with abx she was Rx prophylactically. Exam with mild degree of soft tissue swelling, subcentimeter, not significant abscess formation, purulent drainage from the rev amp site and no erythema, tenderness or swelling proximal to the DIPJ, pt able to range DIPJ without difficutly, not septic arthritis though is likely infected in the soft tissues distally, as this has occurred on likely amoxicillin-clavulanate, expanding coverage to Keflex, Bactrim to cover MRSA in addition to oral associated pathogens. Encouraged soapy warm water soaks on recommendation from Dr. Lisa Gurrola and he voiced able to follow up with Dr. Gertrudis Schilling and the hand team within 1-2 days. Pt voiced understanding of importance of follow-up, given discharge instructions, return precautions for proximal spread of current symptoms, development of fevers, inability to range her joints and overall worsening. Voiced understanding of return precautions given, stable for and amenable to discharge home. Rx Bactrim and Keflex, naproxen for pain control. Instructed on warm soaks. Discharged home, ambulated steadily from the emergency department upon discharge. CONSULTS:  IP CONSULT TO ORTHOPEDIC SURGERY             FINAL IMPRESSION      1. Traumatic amputation of finger of left hand with complication, initial encounter          DISPOSITION/PLAN   DISPOSITION  Discharged home.     PATIENT REFERRED TO:  Niya Recinos [facility] [address]  [phone]    If symptoms worsen    Opal Mclean MD  [address] [facility] [address] [address]  [phone]    Call in 1 day  for an appointment to be seen tomorrow or Tuesday, 11/1-11/2      DISCHARGE MEDICATIONS:  New Prescriptions    CEPHALEXIN (KEFLEX) 500 MG CAPSULE    Take 1 capsule by mouth 2 times daily for 7 days    NAPROXEN (NAPROSYN) 500 MG TABLET    Take 1 tablet by mouth 2 times daily (with meals)    SULFAMETHOXAZOLE-TRIMETHOPRIM (BACTRIM DS;SEPTRA DS) 800-160 MG PER TABLET    Take 1 tablet by mouth 2 times daily for 7 days          (Please note that portions of this note were completed with a voice recognition program.Efforts were made to edit the dictations but occasionally words are mis-transcribed.)    Terrell Lou MD (electronically signed)  Attending Emergency Physician         Terrell Lou MD  10/31/21 3787 [Negative] : Endocrine [FreeTextEntry3] : icteric

## 2021-11-08 ENCOUNTER — RX RENEWAL (OUTPATIENT)
Age: 71
End: 2021-11-08

## 2021-11-16 ENCOUNTER — APPOINTMENT (OUTPATIENT)
Dept: ALLERGY | Facility: CLINIC | Age: 71
End: 2021-11-16
Payer: MEDICARE

## 2021-11-16 VITALS
WEIGHT: 244 LBS | DIASTOLIC BLOOD PRESSURE: 79 MMHG | BODY MASS INDEX: 32.34 KG/M2 | HEART RATE: 79 BPM | SYSTOLIC BLOOD PRESSURE: 183 MMHG | HEIGHT: 73 IN | OXYGEN SATURATION: 97 %

## 2021-11-16 PROCEDURE — 95018 ALL TSTG PERQ&IQ DRUGS/BIOL: CPT

## 2021-11-16 PROCEDURE — 95004 PERQ TESTS W/ALRGNC XTRCS: CPT

## 2021-11-16 PROCEDURE — 99213 OFFICE O/P EST LOW 20 MIN: CPT | Mod: 25

## 2021-11-16 NOTE — HISTORY OF PRESENT ILLNESS
[de-identified] : Patient feels that the azelastine was much more effective in controlling his symptoms - he is taking Singulair and Omnaris presently

## 2021-11-16 NOTE — ASSESSMENT
[FreeTextEntry1] : Perennial allergic rhinitis:\par \par Continue Singulair QD\par Continue Omnaris\par RV environmental ID testing

## 2021-11-16 NOTE — PHYSICAL EXAM
[Alert] : alert [Well Nourished] : well nourished [Healthy Appearance] : healthy appearance [No Acute Distress] : no acute distress [Well Developed] : well developed [Normal Voice/Communication] : normal voice communication [Normal Nasal Mucosa] : the nasal mucosa was normal [Boggy Nasal Turbinates] : no boggy and/or pale nasal turbinates [No Neck Mass] : no neck mass was observed [No LAD] : no lymphadenopathy [No Thyroid Mass] : no thyroid mass [Supple] : the neck was supple [Normal Rate and Effort] : normal respiratory rhythm and effort [No Crackles] : no crackles [No Retractions] : no retractions [Bilateral Audible Breath Sounds] : bilateral audible breath sounds [Wheezing] : no wheezing was heard [Normal Rate] : heart rate was normal  [Normal S1, S2] : normal S1 and S2 [No murmur] : no murmur [Regular Rhythm] : with a regular rhythm [Normal Cervical Lymph Nodes] : cervical [No Rash] : no rash [Normal Mood] : mood was normal [Normal Affect] : affect was normal [Judgment and Insight Age Appropriate] : judgement and insight is age appropriate [Alert, Awake, Oriented as Age-Appropriate] : alert, awake, oriented as age appropriate

## 2021-11-16 NOTE — REASON FOR VISIT
[Routine Follow-Up] : a routine follow-up visit for [FreeTextEntry3] : allergy skin testing and follow up

## 2021-11-16 NOTE — SOCIAL HISTORY
[Spouse/Partner] : spouse/partner [FreeTextEntry2] : retired -   [Apartment] : [unfilled] lives in an apartment  [Radiator/Baseboard] : heating provided by radiator(s)/baseboard(s) [Central] : air conditioning provided by central unit [Bedroom] : not in the bedroom [Living Area] : in living area [None] : none [] :  [Smokers in Household] : there are no smokers in the home

## 2021-11-16 NOTE — REVIEW OF SYSTEMS
[Nl] : Genitourinary [FreeTextEntry5] : hypertension  [FreeTextEntry7] : liver enzymes elevated - recent resolution.  - Gilbert's disease

## 2021-12-07 ENCOUNTER — APPOINTMENT (OUTPATIENT)
Dept: ALLERGY | Facility: CLINIC | Age: 71
End: 2021-12-07
Payer: MEDICARE

## 2021-12-07 VITALS
BODY MASS INDEX: 32.34 KG/M2 | DIASTOLIC BLOOD PRESSURE: 85 MMHG | HEART RATE: 81 BPM | OXYGEN SATURATION: 98 % | HEIGHT: 73 IN | SYSTOLIC BLOOD PRESSURE: 188 MMHG | WEIGHT: 244 LBS

## 2021-12-07 PROCEDURE — 95024 IQ TESTS W/ALLERGENIC XTRCS: CPT

## 2021-12-07 PROCEDURE — 95018 ALL TSTG PERQ&IQ DRUGS/BIOL: CPT

## 2021-12-07 NOTE — ASSESSMENT
[FreeTextEntry1] : Perennial allergic rhinoconjunctivitis:\par \par Singulair QD\par Stop azelastine 3 D before skin testing\par Continue Omnaris \par RV environmental intradermal skin testing

## 2021-12-07 NOTE — HISTORY OF PRESENT ILLNESS
[Asthma] : asthma [Eczematous rashes] : eczematous rashes [Venom Reactions] : venom reactions [Food Allergies] : food allergies [de-identified] : Many years of nasal congestion - HA - tooth pain - constant PND - water/itchy eyes - symptoms are present spring thru the fall.   Patient taking azelastine BID - Pataday - he was recently seen at Urgent Care and prescribed azelastine nasal spray.    OTC antihistamines have not helped him whereas this particular nasal spray was very effective.   No associated chest symptoms.   Exercises regularly with no SOB or coughing.

## 2021-12-07 NOTE — PHYSICAL EXAM
[Alert] : alert [Well Nourished] : well nourished [Healthy Appearance] : healthy appearance [No Acute Distress] : no acute distress [Well Developed] : well developed [Normal Voice/Communication] : normal voice communication [Normal Nasal Mucosa] : the nasal mucosa was normal [No Neck Mass] : no neck mass was observed [No LAD] : no lymphadenopathy [No Thyroid Mass] : no thyroid mass [Supple] : the neck was supple [Normal Rate and Effort] : normal respiratory rhythm and effort [No Crackles] : no crackles [No Retractions] : no retractions [Bilateral Audible Breath Sounds] : bilateral audible breath sounds [Normal Rate] : heart rate was normal  [Normal S1, S2] : normal S1 and S2 [No murmur] : no murmur [Regular Rhythm] : with a regular rhythm [Normal Cervical Lymph Nodes] : cervical [No Rash] : no rash [Normal Mood] : mood was normal [Normal Affect] : affect was normal [Judgment and Insight Age Appropriate] : judgement and insight is age appropriate [Alert, Awake, Oriented as Age-Appropriate] : alert, awake, oriented as age appropriate [Boggy Nasal Turbinates] : no boggy and/or pale nasal turbinates [Wheezing] : no wheezing was heard

## 2021-12-07 NOTE — SOCIAL HISTORY
[Spouse/Partner] : spouse/partner [Apartment] : [unfilled] lives in an apartment  [Radiator/Baseboard] : heating provided by radiator(s)/baseboard(s) [Central] : air conditioning provided by central unit [Living Area] : in living area [None] : none [] :  [FreeTextEntry2] : retired -   [Bedroom] : not in the bedroom [Smokers in Household] : there are no smokers in the home

## 2021-12-07 NOTE — ASSESSMENT
[FreeTextEntry1] : Allergic rhinoconjunctivitis:\par \par Azelastine BID \par RV prn symptoms \par Discontinue Singulair and Omnaris \par \par

## 2021-12-09 ENCOUNTER — APPOINTMENT (OUTPATIENT)
Dept: GASTROENTEROLOGY | Facility: CLINIC | Age: 71
End: 2021-12-09
Payer: MEDICARE

## 2021-12-09 VITALS
HEART RATE: 91 BPM | SYSTOLIC BLOOD PRESSURE: 140 MMHG | DIASTOLIC BLOOD PRESSURE: 90 MMHG | BODY MASS INDEX: 32.34 KG/M2 | HEIGHT: 73 IN | OXYGEN SATURATION: 98 % | TEMPERATURE: 97.4 F | WEIGHT: 244 LBS

## 2021-12-09 PROCEDURE — 99213 OFFICE O/P EST LOW 20 MIN: CPT

## 2021-12-16 NOTE — ASSESSMENT
[FreeTextEntry1] : #1. Acute pancreatitis, April 2020, may have been due to passed stone, no choledocholithiasis on MRCP or serial EUS. Jaundice resolved.\par \par #2. 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  Imaging in 2021 demonstrates stability of remaining cysts, which are likely IPMN, maximum size 1.3 cm.\par \par #3. Resolved elevated CA 19-9\par \par Plan\par \par #1. Check CA 19-9\par \par #2.  Surveillance MRI of the abdomen in June 2022\par \par #3.  GI office visit after MRI.

## 2021-12-16 NOTE — HISTORY OF PRESENT ILLNESS
[FreeTextEntry1] : Patient follows up for acute pancreatitis (hospitalized April 2020) and pancreas cysts.\par \par No fever, abd pain, n/v, jaundice, weight loss, chronic diarrhea since hospitalization.\par \par No alcohol use since hospitalization\par \par EXAM:  MR MRCP [facility] IC\par \par PROCEDURE DATE:  06/27/2021\par \par INTERPRETATION:  CLINICAL INFORMATION: Pancreatic cyst/pseudocyst surveillance, follow-up.\par \par COMPARISON: MR 4/19/2020.\par \par CONTRAST/COMPLICATIONS:\par IV Contrast: Gadavist  10 cc administered   0 cc discarded\par Oral Contrast: NONE\par Complications: None reported at time of study completion\par \par PROCEDURE:\par MRI of the abdomen was performed.\par MRCP was performed.\par \par FINDINGS:\par LOWER CHEST: Within normal limits.\par \par LIVER: Normal morphology. Steatosis. Subcentimeter hepatic cysts.\par BILE DUCTS: Normal caliber.\par GALLBLADDER: Interval resolution of previously noted thickened gallbladder wall. No cholelithiasis.\par SPLEEN: Within normal limits.\par PANCREAS: Multiple cystic lesions with referenced 1.3 cm cystic lesion in the pancreatic body and mild upstream ductal dilatation without significant change.\par ADRENALS: Within normal limits.\par KIDNEYS/URETERS: Bilateral cortical renal cysts, some of which are hemorrhagic in the right kidney with largest measuring 1.8 cm in the right upper pole.\par \par VISUALIZED PORTIONS:\par BOWEL: Within normal limits.\par PERITONEUM: No ascites.\par VESSELS: Within normal limits.\par RETROPERITONEUM/LYMPH NODES: No lymphadenopathy.\par ABDOMINAL WALL: Within normal limits.\par BONES: Within normal limits.\par \par IMPRESSION:\par \par Multiple pancreatic cystic lesion with referenced 1.3 cm pancreatic body lesion without significant change.\par       \par

## 2021-12-16 NOTE — CONSULT LETTER
[Dear  ___] : Dear  [unfilled], [Consult Letter:] : I had the pleasure of evaluating your patient, [unfilled]. [Please see my note below.] : Please see my note below. [Consult Closing:] : Thank you very much for allowing me to participate in the care of this patient.  If you have any questions, please do not hesitate to contact me. [Sincerely,] : Sincerely, [FreeTextEntry3] : Stephen Banks MD, MPH, FASGE\par Chief of Clinical Quality in Gastroenterology, [facility]\par Associate Chief of Gastroenterology, [facility]\par Director of Endoscopic Ultrasound, [facility]\par [address], [address]\par [place], [address]\par 24 hours [phone]\par \par

## 2022-03-03 ENCOUNTER — TRANSCRIPTION ENCOUNTER (OUTPATIENT)
Age: 72
End: 2022-03-03

## 2022-03-11 ENCOUNTER — RX RENEWAL (OUTPATIENT)
Age: 72
End: 2022-03-11

## 2022-03-15 ENCOUNTER — APPOINTMENT (OUTPATIENT)
Dept: INTERNAL MEDICINE | Facility: CLINIC | Age: 72
End: 2022-03-15
Payer: MEDICARE

## 2022-03-15 VITALS
HEIGHT: 73 IN | TEMPERATURE: 98.7 F | SYSTOLIC BLOOD PRESSURE: 152 MMHG | OXYGEN SATURATION: 98 % | WEIGHT: 244 LBS | RESPIRATION RATE: 16 BRPM | DIASTOLIC BLOOD PRESSURE: 90 MMHG | BODY MASS INDEX: 32.34 KG/M2 | HEART RATE: 85 BPM

## 2022-03-15 VITALS — DIASTOLIC BLOOD PRESSURE: 80 MMHG | SYSTOLIC BLOOD PRESSURE: 140 MMHG

## 2022-03-15 PROCEDURE — 99214 OFFICE O/P EST MOD 30 MIN: CPT | Mod: 25

## 2022-03-15 PROCEDURE — 36415 COLL VENOUS BLD VENIPUNCTURE: CPT

## 2022-03-15 NOTE — HISTORY OF PRESENT ILLNESS
[FreeTextEntry1] : HTN / BP  check [de-identified] : Patient comes for 6 month follow-up and BP check.\par \par He reports feeling well. Has no complaints. \par Home BP readings have been elevated, ranging 140-160/79-89. Compliant with medications.\par He has been taking Azelastine for allergic rhinitis with good results.\par \par

## 2022-03-15 NOTE — PHYSICAL EXAM
[No Acute Distress] : no acute distress [Well Developed] : well developed [Well-Appearing] : well-appearing [Normal Sclera/Conjunctiva] : normal sclera/conjunctiva [PERRL] : pupils equal round and reactive to light [EOMI] : extraocular movements intact [Normal Outer Ear/Nose] : the outer ears and nose were normal in appearance [Normal Oropharynx] : the oropharynx was normal [Normal TMs] : both tympanic membranes were normal [Supple] : supple [No Lymphadenopathy] : no lymphadenopathy [Thyroid Normal, No Nodules] : the thyroid was normal and there were no nodules present [No Respiratory Distress] : no respiratory distress  [Clear to Auscultation] : lungs were clear to auscultation bilaterally [Normal Rate] : normal rate  [Regular Rhythm] : with a regular rhythm [Normal S1, S2] : normal S1 and S2 [No Murmur] : no murmur heard [No Edema] : there was no peripheral edema [Soft] : abdomen soft [Non Tender] : non-tender [No Masses] : no abdominal mass palpated [Normal Posterior Cervical Nodes] : no posterior cervical lymphadenopathy [Normal Anterior Cervical Nodes] : no anterior cervical lymphadenopathy [Grossly Normal Strength/Tone] : grossly normal strength/tone [Normal Gait] : normal gait [Coordination Grossly Intact] : coordination grossly intact [Normal Mood] : the mood was normal [Normal Insight/Judgement] : insight and judgment were intact [de-identified] : friendly [de-identified] : scattered erythematous rash on face (mask distribution)

## 2022-03-15 NOTE — PLAN
[FreeTextEntry1] : Hypertension remains elevated. Increase Metoprolol to 50 mg daily. Continue losartan and HCTZ. Low salt.\par Has ALBERT and Kaleb's. Followed with Dr. Domingo. Check LFT's. Check A1c and lipids.\par Hx of IPMN. Check CA 19-9. Followed with Dr. Banks. Repeat MRI abdomen in 6/22.\par \par Return to office in 8/22 for physical.

## 2022-03-16 ENCOUNTER — TRANSCRIPTION ENCOUNTER (OUTPATIENT)
Age: 72
End: 2022-03-16

## 2022-03-16 LAB
ALBUMIN SERPL ELPH-MCNC: 4.8 G/DL
ALP BLD-CCNC: 48 U/L
ALT SERPL-CCNC: 57 U/L
ANION GAP SERPL CALC-SCNC: 16 MMOL/L
AST SERPL-CCNC: 43 U/L
BILIRUB SERPL-MCNC: 2.2 MG/DL
BUN SERPL-MCNC: 20 MG/DL
CALCIUM SERPL-MCNC: 9.5 MG/DL
CANCER AG19-9 SERPL-ACNC: 11 U/ML
CHLORIDE SERPL-SCNC: 100 MMOL/L
CHOLEST SERPL-MCNC: 171 MG/DL
CO2 SERPL-SCNC: 24 MMOL/L
CREAT SERPL-MCNC: 1.13 MG/DL
EGFR: 70 ML/MIN/1.73M2
ESTIMATED AVERAGE GLUCOSE: 103 MG/DL
GLUCOSE SERPL-MCNC: 117 MG/DL
HBA1C MFR BLD HPLC: 5.2 %
HDLC SERPL-MCNC: 38 MG/DL
LDLC SERPL CALC-MCNC: 108 MG/DL
NONHDLC SERPL-MCNC: 133 MG/DL
POTASSIUM SERPL-SCNC: 3.8 MMOL/L
PROT SERPL-MCNC: 7 G/DL
SODIUM SERPL-SCNC: 139 MMOL/L
TRIGL SERPL-MCNC: 125 MG/DL

## 2022-03-29 ENCOUNTER — APPOINTMENT (OUTPATIENT)
Dept: INTERNAL MEDICINE | Facility: CLINIC | Age: 72
End: 2022-03-29
Payer: MEDICARE

## 2022-03-29 ENCOUNTER — NON-APPOINTMENT (OUTPATIENT)
Age: 72
End: 2022-03-29

## 2022-03-29 VITALS
WEIGHT: 243 LBS | DIASTOLIC BLOOD PRESSURE: 80 MMHG | HEART RATE: 78 BPM | SYSTOLIC BLOOD PRESSURE: 130 MMHG | OXYGEN SATURATION: 98 % | HEIGHT: 73 IN | BODY MASS INDEX: 32.2 KG/M2

## 2022-03-29 VITALS — DIASTOLIC BLOOD PRESSURE: 80 MMHG | SYSTOLIC BLOOD PRESSURE: 122 MMHG

## 2022-03-29 DIAGNOSIS — Z01.818 ENCOUNTER FOR OTHER PREPROCEDURAL EXAMINATION: ICD-10-CM

## 2022-03-29 PROCEDURE — 99214 OFFICE O/P EST MOD 30 MIN: CPT | Mod: 25

## 2022-03-29 PROCEDURE — 36415 COLL VENOUS BLD VENIPUNCTURE: CPT

## 2022-03-29 PROCEDURE — 93000 ELECTROCARDIOGRAM COMPLETE: CPT

## 2022-03-29 RX ORDER — HYDROCHLOROTHIAZIDE 25 MG/1
25 TABLET ORAL
Qty: 90 | Refills: 0 | Status: DISCONTINUED | COMMUNITY
Start: 2021-12-16

## 2022-03-29 RX ORDER — DOXYCYCLINE HYCLATE 100 MG/1
100 CAPSULE ORAL
Qty: 14 | Refills: 0 | Status: DISCONTINUED | COMMUNITY
Start: 2022-03-18

## 2022-03-29 RX ORDER — CEFUROXIME AXETIL 500 MG/1
500 TABLET ORAL
Qty: 14 | Refills: 0 | Status: DISCONTINUED | COMMUNITY
Start: 2022-03-03

## 2022-03-29 RX ORDER — COVID-19 TEST SPECIMEN COLLECT
MISCELLANEOUS MISCELLANEOUS
Qty: 1 | Refills: 0 | Status: DISCONTINUED | COMMUNITY
Start: 2021-12-18

## 2022-03-29 RX ORDER — METOPROLOL SUCCINATE 25 MG/1
25 TABLET, EXTENDED RELEASE ORAL
Qty: 90 | Refills: 0 | Status: DISCONTINUED | COMMUNITY
Start: 2021-07-21

## 2022-03-31 ENCOUNTER — APPOINTMENT (OUTPATIENT)
Dept: INTERNAL MEDICINE | Facility: CLINIC | Age: 72
End: 2022-03-31

## 2022-04-01 ENCOUNTER — TRANSCRIPTION ENCOUNTER (OUTPATIENT)
Age: 72
End: 2022-04-01

## 2022-04-01 LAB
ALBUMIN SERPL ELPH-MCNC: 4.9 G/DL
ALP BLD-CCNC: 54 U/L
ALT SERPL-CCNC: 54 U/L
ANION GAP SERPL CALC-SCNC: 18 MMOL/L
APTT BLD: 26.9 SEC
AST SERPL-CCNC: 46 U/L
BASOPHILS # BLD AUTO: 0.04 K/UL
BASOPHILS NFR BLD AUTO: 0.4 %
BILIRUB SERPL-MCNC: 2.5 MG/DL
BUN SERPL-MCNC: 18 MG/DL
CALCIUM SERPL-MCNC: 10 MG/DL
CHLORIDE SERPL-SCNC: 103 MMOL/L
CO2 SERPL-SCNC: 23 MMOL/L
CREAT SERPL-MCNC: 1.15 MG/DL
EGFR: 69 ML/MIN/1.73M2
EOSINOPHIL # BLD AUTO: 0.21 K/UL
EOSINOPHIL NFR BLD AUTO: 2 %
GLUCOSE SERPL-MCNC: 108 MG/DL
HCT VFR BLD CALC: 46.4 %
HGB BLD-MCNC: 15.7 G/DL
IMM GRANULOCYTES NFR BLD AUTO: 0.6 %
INR PPP: 1 RATIO
LYMPHOCYTES # BLD AUTO: 2.82 K/UL
LYMPHOCYTES NFR BLD AUTO: 27 %
MAN DIFF?: NORMAL
MCHC RBC-ENTMCNC: 31.8 PG
MCHC RBC-ENTMCNC: 33.8 GM/DL
MCV RBC AUTO: 93.9 FL
MONOCYTES # BLD AUTO: 0.72 K/UL
MONOCYTES NFR BLD AUTO: 6.9 %
NEUTROPHILS # BLD AUTO: 6.6 K/UL
NEUTROPHILS NFR BLD AUTO: 63.1 %
PLATELET # BLD AUTO: 318 K/UL
POTASSIUM SERPL-SCNC: 5.4 MMOL/L
PROT SERPL-MCNC: 7.7 G/DL
PT BLD: 11.7 SEC
RBC # BLD: 4.94 M/UL
RBC # FLD: 13.3 %
SODIUM SERPL-SCNC: 144 MMOL/L
WBC # FLD AUTO: 10.45 K/UL

## 2022-04-01 NOTE — HISTORY OF PRESENT ILLNESS
[No Pertinent Cardiac History] : no history of aortic stenosis, atrial fibrillation, coronary artery disease, recent myocardial infarction, or implantable device/pacemaker [No Pertinent Pulmonary History] : no history of asthma, COPD, sleep apnea, or smoking [No Adverse Anesthesia Reaction] : no adverse anesthesia reaction in self or family member [(Patient denies any chest pain, claudication, dyspnea on exertion, orthopnea, palpitations or syncope)] : Patient denies any chest pain, claudication, dyspnea on exertion, orthopnea, palpitations or syncope [Chronic Anticoagulation] : no chronic anticoagulation [Chronic Kidney Disease] : no chronic kidney disease [FreeTextEntry1] : Nose flap closure [FreeTextEntry2] : 4/7/22 [FreeTextEntry3] : Dr. Richard Clinton at [facility], fax: [phone] [FreeTextEntry4] : Patient is scheduled for excision of nasal lesion on 4/5 by Dr. Feliberto George. He will then have nose flap closure by Dr. Richard Clinton on 4/7 at [facility]. Initial biopsy of lesion was negative for malignancy but there remains suspicion for malignancy.  [FreeTextEntry7] : EKG

## 2022-04-01 NOTE — ASSESSMENT
[Patient Optimized for Surgery] : Patient optimized for surgery [No Further Testing Recommended] : no further testing recommended [As per surgery] : as per surgery [Modify anti-platelet treatment prior to procedure] : Modify anti-platelet treatment prior to procedure [FreeTextEntry4] : Patient is a low cardiac risk for a low risk surgical procedure. No medical contraindications for planned surgery. He will take his blood pressure medications on morning of surgery with a sip of water.  [FreeTextEntry6] : Hold full dose Aspirin 7 days prior to surgery.

## 2022-04-01 NOTE — PHYSICAL EXAM
[No Acute Distress] : no acute distress [Well Developed] : well developed [Well-Appearing] : well-appearing [Normal Sclera/Conjunctiva] : normal sclera/conjunctiva [PERRL] : pupils equal round and reactive to light [EOMI] : extraocular movements intact [Normal Outer Ear/Nose] : the outer ears and nose were normal in appearance [Normal Oropharynx] : the oropharynx was normal [Normal TMs] : both tympanic membranes were normal [Supple] : supple [No Lymphadenopathy] : no lymphadenopathy [Thyroid Normal, No Nodules] : the thyroid was normal and there were no nodules present [No Respiratory Distress] : no respiratory distress  [Clear to Auscultation] : lungs were clear to auscultation bilaterally [Normal Rate] : normal rate  [Regular Rhythm] : with a regular rhythm [Normal S1, S2] : normal S1 and S2 [No Murmur] : no murmur heard [No Edema] : there was no peripheral edema [Soft] : abdomen soft [Non Tender] : non-tender [No Masses] : no abdominal mass palpated [Normal Posterior Cervical Nodes] : no posterior cervical lymphadenopathy [Normal Anterior Cervical Nodes] : no anterior cervical lymphadenopathy [Grossly Normal Strength/Tone] : grossly normal strength/tone [Normal Gait] : normal gait [Coordination Grossly Intact] : coordination grossly intact [Normal Mood] : the mood was normal [Normal Insight/Judgement] : insight and judgment were intact [de-identified] : scab formation on nose (recent biopsy)

## 2022-04-04 ENCOUNTER — OUTPATIENT (OUTPATIENT)
Dept: OUTPATIENT SERVICES | Facility: HOSPITAL | Age: 72
LOS: 1 days | End: 2022-04-04
Payer: MEDICARE

## 2022-04-04 VITALS
TEMPERATURE: 99 F | HEART RATE: 90 BPM | HEIGHT: 72 IN | OXYGEN SATURATION: 99 % | RESPIRATION RATE: 16 BRPM | SYSTOLIC BLOOD PRESSURE: 150 MMHG | WEIGHT: 242.51 LBS | DIASTOLIC BLOOD PRESSURE: 80 MMHG

## 2022-04-04 DIAGNOSIS — C44.321 SQUAMOUS CELL CARCINOMA OF SKIN OF NOSE: ICD-10-CM

## 2022-04-04 DIAGNOSIS — Z01.818 ENCOUNTER FOR OTHER PREPROCEDURAL EXAMINATION: ICD-10-CM

## 2022-04-04 DIAGNOSIS — I80.229 PHLEBITIS AND THROMBOPHLEBITIS OF UNSPECIFIED POPLITEAL VEIN: ICD-10-CM

## 2022-04-04 DIAGNOSIS — I10 ESSENTIAL (PRIMARY) HYPERTENSION: ICD-10-CM

## 2022-04-04 DIAGNOSIS — Z98.890 OTHER SPECIFIED POSTPROCEDURAL STATES: Chronic | ICD-10-CM

## 2022-04-04 DIAGNOSIS — Z86.018 PERSONAL HISTORY OF OTHER BENIGN NEOPLASM: Chronic | ICD-10-CM

## 2022-04-04 LAB — SARS-COV-2 RNA SPEC QL NAA+PROBE: SIGNIFICANT CHANGE UP

## 2022-04-04 NOTE — H&P PST ADULT - NSICDXPASTMEDICALHX_GEN_ALL_CORE_FT
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

## 2022-04-04 NOTE — H&P PST ADULT - HISTORY OF PRESENT ILLNESS
70 y/o male with squamous cell carcinoma of nose.  Lesion on nose was noted by dermatologist.  Lesion was biopsied 2 weeks ago, but returned negative.  Due to the appearance of lesion was advised for excision.  PMHX includes HTN, seasonal/environmental allergies, h/o DVT (20 yrs ago and negative workup from hematologist, taking Asprin 325 mg, held 3/29/22).  H/o gilbert disease and fatty liver, with mildly elevated LFT's, stable for patient.  Otherwise patient feels well today, denies any acute symptoms.

## 2022-04-04 NOTE — H&P PST ADULT - NSICDXFAMILYHX_GEN_ALL_CORE_FT
FAMILY HISTORY:  FH: breast cancer    Father  Still living? Unknown  FH: heart attack, Age at diagnosis: Age Unknown

## 2022-04-04 NOTE — H&P PST ADULT - PROBLEM SELECTOR PLAN 1
Patient provided with pre-operative instructions and verbalized understanding.  Patient will be NPO on day of surgery. Patient will stop NSAIDs, aspirin, herbal supplements or vitamins 1 week prior to surgery.  Pending medical clearance as per surgeon.  COVID PCR pending per policy. Patient provided with pre-operative instructions and verbalized understanding.  Patient will be NPO on day of surgery. Patient will stop NSAIDs, aspirin, herbal supplements or vitamins 1 week prior to surgery.  Pending medical clearance as per surgeon.  COVID PCR pending per policy.      Patient denies any interval changes in medical status sinc epST and last hospitalization.  He has no physical complaints today.  Annalee LIPSCOMB 4/26/22

## 2022-04-04 NOTE — H&P PST ADULT - FALL HARM RISK - UNIVERSAL INTERVENTIONS
Bed in lowest position, wheels locked, appropriate side rails in place/Call bell, personal items and telephone in reach/Instruct patient to call for assistance before getting out of bed or chair/Non-slip footwear when patient is out of bed/[place] to call system/Physically safe environment - no spills, clutter or unnecessary equipment/Purposeful Proactive Rounding/Room/bathroom lighting operational, light cord in reach

## 2022-04-05 PROCEDURE — G0463: CPT

## 2022-04-05 PROCEDURE — U0003: CPT

## 2022-04-05 PROCEDURE — U0005: CPT

## 2022-04-06 ENCOUNTER — TRANSCRIPTION ENCOUNTER (OUTPATIENT)
Age: 72
End: 2022-04-06

## 2022-04-06 ENCOUNTER — APPOINTMENT (OUTPATIENT)
Dept: SURGICAL ONCOLOGY | Facility: CLINIC | Age: 72
End: 2022-04-06
Payer: MEDICARE

## 2022-04-06 VITALS
OXYGEN SATURATION: 95 % | WEIGHT: 242 LBS | TEMPERATURE: 96.3 F | HEIGHT: 73 IN | SYSTOLIC BLOOD PRESSURE: 141 MMHG | BODY MASS INDEX: 32.07 KG/M2 | HEART RATE: 76 BPM | DIASTOLIC BLOOD PRESSURE: 86 MMHG | RESPIRATION RATE: 15 BRPM

## 2022-04-06 PROBLEM — C44.321 SQUAMOUS CELL CARCINOMA OF SKIN OF NOSE: Chronic | Status: ACTIVE | Noted: 2022-04-04

## 2022-04-06 PROCEDURE — 99205 OFFICE O/P NEW HI 60 MIN: CPT

## 2022-04-07 ENCOUNTER — APPOINTMENT (OUTPATIENT)
Dept: SURGICAL ONCOLOGY | Facility: HOSPITAL | Age: 72
End: 2022-04-07

## 2022-04-07 ENCOUNTER — RESULT REVIEW (OUTPATIENT)
Age: 72
End: 2022-04-07

## 2022-04-07 ENCOUNTER — OUTPATIENT (OUTPATIENT)
Dept: OUTPATIENT SERVICES | Facility: HOSPITAL | Age: 72
LOS: 1 days | End: 2022-04-07
Payer: MEDICARE

## 2022-04-07 ENCOUNTER — TRANSCRIPTION ENCOUNTER (OUTPATIENT)
Age: 72
End: 2022-04-07

## 2022-04-07 VITALS
HEIGHT: 72 IN | WEIGHT: 242.51 LBS | TEMPERATURE: 99 F | DIASTOLIC BLOOD PRESSURE: 89 MMHG | RESPIRATION RATE: 16 BRPM | HEART RATE: 80 BPM | SYSTOLIC BLOOD PRESSURE: 138 MMHG | OXYGEN SATURATION: 98 %

## 2022-04-07 VITALS
RESPIRATION RATE: 16 BRPM | SYSTOLIC BLOOD PRESSURE: 125 MMHG | DIASTOLIC BLOOD PRESSURE: 78 MMHG | TEMPERATURE: 97 F | HEART RATE: 88 BPM | OXYGEN SATURATION: 96 %

## 2022-04-07 DIAGNOSIS — Z86.018 PERSONAL HISTORY OF OTHER BENIGN NEOPLASM: Chronic | ICD-10-CM

## 2022-04-07 DIAGNOSIS — C44.321 SQUAMOUS CELL CARCINOMA OF SKIN OF NOSE: ICD-10-CM

## 2022-04-07 DIAGNOSIS — Z98.890 OTHER SPECIFIED POSTPROCEDURAL STATES: Chronic | ICD-10-CM

## 2022-04-07 PROCEDURE — 21016 RESECT FACE/SCALP TUM 2 CM/>: CPT

## 2022-04-07 PROCEDURE — C9399: CPT

## 2022-04-07 PROCEDURE — 14060 TIS TRNFR E/N/E/L 10 SQ CM/<: CPT

## 2022-04-07 PROCEDURE — 88331 PATH CONSLTJ SURG 1 BLK 1SPC: CPT | Mod: 26

## 2022-04-07 PROCEDURE — 88331 PATH CONSLTJ SURG 1 BLK 1SPC: CPT

## 2022-04-07 PROCEDURE — 88311 DECALCIFY TISSUE: CPT | Mod: 26

## 2022-04-07 PROCEDURE — 88311 DECALCIFY TISSUE: CPT

## 2022-04-07 PROCEDURE — 88305 TISSUE EXAM BY PATHOLOGIST: CPT

## 2022-04-07 PROCEDURE — 21012 EXC FACE LES SBQ 2 CM/>: CPT

## 2022-04-07 PROCEDURE — 15260 FTH/GFT FR N/E/E/L 20 SQCM/<: CPT

## 2022-04-07 PROCEDURE — 88305 TISSUE EXAM BY PATHOLOGIST: CPT | Mod: 26

## 2022-04-07 RX ORDER — SODIUM CHLORIDE 9 MG/ML
1000 INJECTION, SOLUTION INTRAVENOUS
Refills: 0 | Status: DISCONTINUED | OUTPATIENT
Start: 2022-04-07 | End: 2022-04-07

## 2022-04-07 RX ORDER — ONDANSETRON 8 MG/1
4 TABLET, FILM COATED ORAL ONCE
Refills: 0 | Status: DISCONTINUED | OUTPATIENT
Start: 2022-04-07 | End: 2022-04-08

## 2022-04-07 RX ORDER — CEPHALEXIN 500 MG
1 CAPSULE ORAL
Qty: 28 | Refills: 0
Start: 2022-04-07 | End: 2022-04-13

## 2022-04-07 RX ORDER — ACETAMINOPHEN 500 MG
650 TABLET ORAL ONCE
Refills: 0 | Status: DISCONTINUED | OUTPATIENT
Start: 2022-04-07 | End: 2022-04-22

## 2022-04-07 RX ORDER — ASPIRIN/CALCIUM CARB/MAGNESIUM 324 MG
1 TABLET ORAL
Qty: 0 | Refills: 0 | DISCHARGE

## 2022-04-07 RX ORDER — OXYCODONE HYDROCHLORIDE 5 MG/1
1 TABLET ORAL
Qty: 16 | Refills: 0
Start: 2022-04-07 | End: 2022-04-10

## 2022-04-07 RX ORDER — SODIUM CHLORIDE 9 MG/ML
1000 INJECTION, SOLUTION INTRAVENOUS
Refills: 0 | Status: DISCONTINUED | OUTPATIENT
Start: 2022-04-07 | End: 2022-04-08

## 2022-04-07 RX ORDER — HYDROMORPHONE HYDROCHLORIDE 2 MG/ML
0.5 INJECTION INTRAMUSCULAR; INTRAVENOUS; SUBCUTANEOUS
Refills: 0 | Status: DISCONTINUED | OUTPATIENT
Start: 2022-04-07 | End: 2022-04-08

## 2022-04-07 RX ORDER — OXYCODONE HYDROCHLORIDE 5 MG/1
5 TABLET ORAL ONCE
Refills: 0 | Status: DISCONTINUED | OUTPATIENT
Start: 2022-04-07 | End: 2022-04-07

## 2022-04-07 RX ADMIN — OXYCODONE HYDROCHLORIDE 5 MILLIGRAM(S): 5 TABLET ORAL at 22:35

## 2022-04-07 RX ADMIN — HYDROMORPHONE HYDROCHLORIDE 0.5 MILLIGRAM(S): 2 INJECTION INTRAMUSCULAR; INTRAVENOUS; SUBCUTANEOUS at 22:35

## 2022-04-07 RX ADMIN — SODIUM CHLORIDE 50 MILLILITER(S): 9 INJECTION, SOLUTION INTRAVENOUS at 16:31

## 2022-04-07 RX ADMIN — OXYCODONE HYDROCHLORIDE 5 MILLIGRAM(S): 5 TABLET ORAL at 23:35

## 2022-04-07 RX ADMIN — HYDROMORPHONE HYDROCHLORIDE 0.5 MILLIGRAM(S): 2 INJECTION INTRAMUSCULAR; INTRAVENOUS; SUBCUTANEOUS at 22:05

## 2022-04-07 NOTE — ASU PATIENT PROFILE, ADULT - NS PRO PASSIVE SMOKE EXP
Continue PT and massage 
Well controlled  Continue amlodipine and benazepril
With increased symptoms over the winter  Increase wellbutrin to 150 mg twice daily  Call if symptoms not improving over the next 4 weeks 
No

## 2022-04-07 NOTE — ASU DISCHARGE PLAN (ADULT/PEDIATRIC) - ASU DC SPECIAL INSTRUCTIONSFT
sleep on back   keep dressings intact   no shower sponge bath only do not wet face   Take pain medication and antibiotic as prescribed -  if pain mild take tylenol only   follow up with Dr Clinton on Monday April 11

## 2022-04-07 NOTE — ASU DISCHARGE PLAN (ADULT/PEDIATRIC) - NURSING INSTRUCTIONS
Frequent hand washing prevents the spread of infection.. Signs and symptoms of infection: fever, redness, swelling, foul smelling discharge. All of discharge, safety, pain management, follow up with surgeon. Verbalized understanding of all instructions.

## 2022-04-07 NOTE — ASU DISCHARGE PLAN (ADULT/PEDIATRIC) - CARE PROVIDER_API CALL
Richard Clinton)  Plastic Surgery; Surgery  [address], [address]  [place] [address]  Phone: [phone]  Fax: [phone]  Scheduled Appointment: 04/11/2022

## 2022-04-07 NOTE — ASU DISCHARGE PLAN (ADULT/PEDIATRIC) - NS MD DC FALL RISK RISK
For information on Fall & Injury Prevention, visit: [email or web address] OR  [email or web address] OR  https://www.cdc.gov/steadi/patient.html

## 2022-04-07 NOTE — BRIEF OPERATIVE NOTE - NSICDXBRIEFPROCEDURE_GEN_ALL_CORE_FT
PROCEDURES:  Surgical removal of basal cell carcinoma of nose with flap closure 07-Apr-2022 21:02:29  Shruthi Mcmillan

## 2022-04-07 NOTE — ASU PREOP CHECKLIST - NS PREOP CHK MONITOR ANESTHESIA CONSENT
Attempted for patient to use the commode and bedpan to urinate  Bladder scanned for 150  White sx resident contacted and order for 1L bolus put in  rescan in four hours  done

## 2022-04-15 ENCOUNTER — OUTPATIENT (OUTPATIENT)
Dept: OUTPATIENT SERVICES | Facility: HOSPITAL | Age: 72
LOS: 1 days | End: 2022-04-15

## 2022-04-15 DIAGNOSIS — Z98.890 OTHER SPECIFIED POSTPROCEDURAL STATES: Chronic | ICD-10-CM

## 2022-04-15 DIAGNOSIS — C43.9 MALIGNANT MELANOMA OF SKIN, UNSPECIFIED: ICD-10-CM

## 2022-04-15 DIAGNOSIS — Z86.018 PERSONAL HISTORY OF OTHER BENIGN NEOPLASM: Chronic | ICD-10-CM

## 2022-04-18 ENCOUNTER — RESULT REVIEW (OUTPATIENT)
Age: 72
End: 2022-04-18

## 2022-04-20 LAB — SURGICAL PATHOLOGY STUDY: SIGNIFICANT CHANGE UP

## 2022-04-22 NOTE — CONSULT LETTER
[Dear  ___] : Dear  [unfilled], [Consult Letter:] : I had the pleasure of evaluating your patient, [unfilled]. [Please see my note below.] : Please see my note below. [Sincerely,] : Sincerely, [FreeTextEntry3] : Hayes Felipe MD FACS\par

## 2022-04-22 NOTE — HISTORY OF PRESENT ILLNESS
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 He underwent attempted Mohs surgery by Dr. George who had persistently positive deep margins and referred the pt. for wide excision.  Patient is scheduled for wide excision with skin graft reconstruction tomorrow with Dr. Clinton of plastic surgery. \par \par Dermatopathology report (3/16/22):\par A. Left Tip of Nose:\par -Surface of acanthotic squamous proliferation, transected.\par \par Patient's past medical history is significant for HTN, Blood clots, Fatty liver. \par He has hx of squamous and basal cell carcinomas. \par Covid vaccinated w/ booster dose.

## 2022-04-25 ENCOUNTER — TRANSCRIPTION ENCOUNTER (OUTPATIENT)
Age: 72
End: 2022-04-25

## 2022-04-25 ENCOUNTER — OUTPATIENT (OUTPATIENT)
Dept: OUTPATIENT SERVICES | Facility: HOSPITAL | Age: 72
LOS: 1 days | End: 2022-04-25
Payer: MEDICARE

## 2022-04-25 DIAGNOSIS — Z86.018 PERSONAL HISTORY OF OTHER BENIGN NEOPLASM: Chronic | ICD-10-CM

## 2022-04-25 DIAGNOSIS — Z20.828 CONTACT WITH AND (SUSPECTED) EXPOSURE TO OTHER VIRAL COMMUNICABLE DISEASES: ICD-10-CM

## 2022-04-25 DIAGNOSIS — Z98.890 OTHER SPECIFIED POSTPROCEDURAL STATES: Chronic | ICD-10-CM

## 2022-04-25 LAB — SARS-COV-2 RNA SPEC QL NAA+PROBE: SIGNIFICANT CHANGE UP

## 2022-04-25 PROCEDURE — 87635 SARS-COV-2 COVID-19 AMP PRB: CPT

## 2022-04-26 ENCOUNTER — TRANSCRIPTION ENCOUNTER (OUTPATIENT)
Age: 72
End: 2022-04-26

## 2022-04-26 RX ORDER — MULTIVIT-MIN/FERROUS GLUCONATE 9 MG/15 ML
1 LIQUID (ML) ORAL
Qty: 0 | Refills: 0 | DISCHARGE

## 2022-04-26 RX ORDER — CEPHALEXIN 500 MG
0 CAPSULE ORAL
Qty: 0 | Refills: 0 | DISCHARGE

## 2022-04-26 RX ORDER — OLOPATADINE HYDROCHLORIDE 1 MG/ML
1 SOLUTION/ DROPS OPHTHALMIC
Qty: 0 | Refills: 0 | DISCHARGE

## 2022-04-26 RX ORDER — METOPROLOL TARTRATE 50 MG
1 TABLET ORAL
Qty: 0 | Refills: 0 | DISCHARGE

## 2022-04-26 RX ORDER — LOSARTAN POTASSIUM 100 MG/1
1 TABLET, FILM COATED ORAL
Qty: 0 | Refills: 0 | DISCHARGE

## 2022-04-26 RX ORDER — AZELASTINE 137 UG/1
2 SPRAY, METERED NASAL
Qty: 0 | Refills: 0 | DISCHARGE

## 2022-04-26 RX ORDER — TADALAFIL 10 MG/1
1 TABLET, FILM COATED ORAL
Qty: 0 | Refills: 0 | DISCHARGE

## 2022-04-27 ENCOUNTER — APPOINTMENT (OUTPATIENT)
Dept: SURGICAL ONCOLOGY | Facility: CLINIC | Age: 72
End: 2022-04-27

## 2022-04-27 NOTE — HISTORY OF PRESENT ILLNESS
[de-identified] : Patient is a 72 y/o male who presents a post-op visit for tip of nose keratoacanthoma. He underwent attempted Mohs surgery by Dr. George who had persistently positive deep margins and referred the pt. for wide excision.  \par Today he is status post wide excision with skin graft reconstruction with Dr. Clinton of plastic surgery on 4/\par \par Dermatopathology report (3/16/22):\par A. Left Tip of Nose:\par -Surface of acanthotic squamous proliferation, transected.\par \par Patient's past medical history is significant for HTN, Blood clots, Fatty liver. \par He has hx of squamous and basal cell carcinomas. \par Covid vaccinated w/ booster dose.

## 2022-04-27 NOTE — ADDENDUM
[FreeTextEntry1] : I, Annette Boyd, acted solely as a scribe for Dr. Hayes Felipe on this date 04/27/2022.\par

## 2022-05-11 ENCOUNTER — NON-APPOINTMENT (OUTPATIENT)
Age: 72
End: 2022-05-11

## 2022-06-06 ENCOUNTER — NON-APPOINTMENT (OUTPATIENT)
Age: 72
End: 2022-06-06

## 2022-06-09 ENCOUNTER — RX RENEWAL (OUTPATIENT)
Age: 72
End: 2022-06-09

## 2022-06-29 ENCOUNTER — APPOINTMENT (OUTPATIENT)
Dept: MRI IMAGING | Facility: CLINIC | Age: 72
End: 2022-06-29

## 2022-06-29 ENCOUNTER — RX RENEWAL (OUTPATIENT)
Age: 72
End: 2022-06-29

## 2022-06-29 ENCOUNTER — OUTPATIENT (OUTPATIENT)
Dept: OUTPATIENT SERVICES | Facility: HOSPITAL | Age: 72
LOS: 1 days | End: 2022-06-29
Payer: MEDICARE

## 2022-06-29 DIAGNOSIS — Z82.49 FAMILY HISTORY OF ISCHEMIC HEART DISEASE AND OTHER DISEASES OF THE CIRCULATORY SYSTEM: ICD-10-CM

## 2022-06-29 DIAGNOSIS — Z86.018 PERSONAL HISTORY OF OTHER BENIGN NEOPLASM: Chronic | ICD-10-CM

## 2022-06-29 DIAGNOSIS — Z98.890 OTHER SPECIFIED POSTPROCEDURAL STATES: Chronic | ICD-10-CM

## 2022-06-29 DIAGNOSIS — K86.2 CYST OF PANCREAS: ICD-10-CM

## 2022-06-29 PROCEDURE — 74183 MRI ABD W/O CNTR FLWD CNTR: CPT | Mod: 26,MH

## 2022-06-29 PROCEDURE — A9585: CPT

## 2022-06-29 PROCEDURE — 74183 MRI ABD W/O CNTR FLWD CNTR: CPT

## 2022-07-06 ENCOUNTER — APPOINTMENT (OUTPATIENT)
Dept: SURGICAL ONCOLOGY | Facility: CLINIC | Age: 72
End: 2022-07-06

## 2022-07-06 VITALS
HEIGHT: 73 IN | TEMPERATURE: 98.5 F | SYSTOLIC BLOOD PRESSURE: 195 MMHG | BODY MASS INDEX: 32.07 KG/M2 | DIASTOLIC BLOOD PRESSURE: 102 MMHG | HEART RATE: 84 BPM | OXYGEN SATURATION: 96 % | WEIGHT: 242 LBS | RESPIRATION RATE: 16 BRPM

## 2022-07-06 PROCEDURE — 99024 POSTOP FOLLOW-UP VISIT: CPT

## 2022-07-06 NOTE — ADDENDUM
[FreeTextEntry1] : I, Annette Boyd, acted solely as a scribe for Dr. Hayes Felipe on this date 07/06/2022.\par \par

## 2022-07-06 NOTE — PHYSICAL EXAM
[Normal] : supple, no neck mass and thyroid not enlarged [Normal Neck Lymph Nodes] : normal neck lymph nodes  [Normal Supraclavicular Lymph Nodes] : normal supraclavicular lymph nodes [Normal Groin Lymph Nodes] : normal groin lymph nodes [Normal Axillary Lymph Nodes] : normal axillary lymph nodes [Normal] : oriented to person, place and time, with appropriate affect [de-identified] : nasolabial flaps well healed. no evidence of infection.

## 2022-07-06 NOTE — ASSESSMENT
[FreeTextEntry1] : S/p wide excision w/ skin graft of nose keratoacanthoma\par Margins negative on final pathology\par Will refer to radiation oncology in [place] \par Continue dermatologic surveillance\par RTO 3 months\par

## 2022-07-06 NOTE — HISTORY OF PRESENT ILLNESS
[de-identified] : Patient is a 72 y/o male who presents a follow up visit for tip of nose keratoacanthoma. He underwent attempted Mohs surgery by Dr. George who had persistently positive deep margins and referred the pt. for wide excision.  \par He is status post wide excision with skin graft reconstruction with Dr. Clinton of plastic surgery on 4/7/22. \par \par Surgical Pathology (4/7/22): \par 1. Skin, superior margin nose, biopsy- Skin negative for carcinoma.\par 2. Skin, left lateral margin, biopsy- Skin negative for carcinoma.\par 3. Skin, right lateral margin, biopsy- Skin negative for carcinoma.\par 4. Skin, upper lip margin, biopsy- Skin negative for carcinoma.\par 5. Skin, left nasal vault, biopsy\par - Skin with minute focus suspicious for invasive squamous cell carcinoma, markedly obscured by inflammatory changes, granulation tissue and cautery artifact (see comment).\par - Cartilage negative for carcinoma.\par 6. Skin, right nasal vault, biopsy- Skin negative for carcinoma.\par 7. Skin, intracartilaginous septum, biopsy\par - Minute focus consistent with invasive squamous cell carcinoma, markedly obscured by inflammatory changes, granulation tissue and cautery artifact (see comment).\par 8. Skin, right nasal septum, biopsy- Skin negative for carcinoma.\par 9. Skin, intracartilaginous septum, biopsy- Fibrous tissue negative for carcinoma.\par \par Dermatopathology report (3/16/22):\par A. Left Tip of Nose:\par -Surface of acanthotic squamous proliferation, transected.\par \par Patient's past medical history is significant for HTN, Blood clots, Fatty liver. \par He has hx of squamous and basal cell carcinomas. \par Covid vaccinated w/ booster dose.

## 2022-07-21 ENCOUNTER — APPOINTMENT (OUTPATIENT)
Dept: GASTROENTEROLOGY | Facility: CLINIC | Age: 72
End: 2022-07-21

## 2022-07-21 VITALS
SYSTOLIC BLOOD PRESSURE: 180 MMHG | HEART RATE: 87 BPM | HEIGHT: 72 IN | OXYGEN SATURATION: 98 % | BODY MASS INDEX: 32.78 KG/M2 | WEIGHT: 242 LBS | DIASTOLIC BLOOD PRESSURE: 98 MMHG

## 2022-07-21 DIAGNOSIS — K85.90 ACUTE PANCREATITIS WITHOUT NECROSIS OR INFECTION, UNSPECIFIED: ICD-10-CM

## 2022-07-21 PROCEDURE — 99213 OFFICE O/P EST LOW 20 MIN: CPT

## 2022-07-23 PROBLEM — K85.90 ACUTE PANCREATITIS, UNSPECIFIED COMPLICATION STATUS, UNSPECIFIED PANCREATITIS TYPE: Status: ACTIVE | Noted: 2020-07-30

## 2022-07-23 NOTE — CONSULT LETTER
[Dear  ___] : Dear  [unfilled], [Consult Letter:] : I had the pleasure of evaluating your patient, [unfilled]. [Please see my note below.] : Please see my note below. [Consult Closing:] : Thank you very much for allowing me to participate in the care of this patient.  If you have any questions, please do not hesitate to contact me. [Sincerely,] : Sincerely, [FreeTextEntry3] : Stephen Banks MD, MPH, MELONIE, DEVON\par Chief of Clinical Quality in Gastroenterology, [facility]\par Associate Chief of Gastroenterology, [facility]\par Interim Director of Endoscopy, [facility]\par Director of Endoscopic Ultrasound, [facility]\par [address], [address]\par [place], [address]\par 24 hours [phone]\par \par

## 2022-07-23 NOTE — ASSESSMENT
[FreeTextEntry1] : #1. Acute pancreatitis, April 2020, may have been due to passed stone, no choledocholithiasis on MRCP or serial EUS. Jaundice resolved.\par \par #2. 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 Imaging in 2021 demonstrates stability of remaining cysts, which are likely IPMN, maximum size 1.3 cm.  Stable MRI/MRCP June 2022, maximum size 1.4 mm\par \par #3. Resolved elevated CA 19-9, still normal in 2022.\par \par Plan\par \par #1.  Surveillance MRI of the abdomen in June 2023\par \par #2. GI office visit after MRI.

## 2022-07-23 NOTE — HISTORY OF PRESENT ILLNESS
[FreeTextEntry1] : Patient follows up for acute pancreatitis (hospitalized April 2020) and pancreas cysts.\par \par No fever, abd pain, n/v, jaundice, weight loss, chronic diarrhea \par \par EXAM:  MR MRCP [facility] IC 6/29/22:\par \par FINDINGS:\par LOWER CHEST: Unremarkable.\par \par LIVER: Fatty infiltration. Subcentimeter cyst in the left hepatic lobe.\par BILE DUCTS: Normal caliber.\par GALLBLADDER: Layering low T2 signal in the gallbladder (series 4 image \par 19), either sludge or stones. No gallbladder wall thickening.\par SPLEEN: Within normal limits.\par PANCREAS: Scattered cystic lesions throughout the pancreas measuring up \par to 1.4 x 0.7 cm in the pancreatic body (series 4 image 16) containing a \par thin internal septation, not significantly changed since 6/27/2021. Mild \par pancreatic ductal dilatation upstream to the 1.4 cm cyst, unchanged. No \par suspicious enhancement.\par ADRENALS: Within normal limits.\par KIDNEYS/URETERS: No hydronephrosis. Bilateral renal cysts, some of which \par have hemorrhagic or pernicious content in the right kidney.\par \par VISUALIZED PORTIONS:\par BOWEL: Normal caliber.\par PERITONEUM: No ascites.\par VESSELS: Abdominal aorta normal in caliber. IVC and hepatic veins are \par patent. Portal, splenic, and superior mesenteric veins are patent.\par RETROPERITONEUM/LYMPH NODES: No lymphadenopathy.\par ABDOMINAL WALL: Unremarkable.\par BONES: No aggressive osseous lesion.\par \par IMPRESSION:\par \par Multiple pancreatic cystic lesions measuring up to 1.4 cm in the \par pancreatic body, not significantly changed since 6/27/2021, likely side \par branch IPMN's.

## 2022-08-28 ENCOUNTER — RX RENEWAL (OUTPATIENT)
Age: 72
End: 2022-08-28

## 2022-09-09 ENCOUNTER — RX RENEWAL (OUTPATIENT)
Age: 72
End: 2022-09-09

## 2022-09-13 ENCOUNTER — LABORATORY RESULT (OUTPATIENT)
Age: 72
End: 2022-09-13

## 2022-09-13 ENCOUNTER — APPOINTMENT (OUTPATIENT)
Dept: INTERNAL MEDICINE | Facility: CLINIC | Age: 72
End: 2022-09-13

## 2022-09-13 VITALS
DIASTOLIC BLOOD PRESSURE: 84 MMHG | OXYGEN SATURATION: 98 % | TEMPERATURE: 98 F | WEIGHT: 243 LBS | HEART RATE: 68 BPM | SYSTOLIC BLOOD PRESSURE: 132 MMHG | BODY MASS INDEX: 32.91 KG/M2 | HEIGHT: 72 IN

## 2022-09-13 VITALS — SYSTOLIC BLOOD PRESSURE: 136 MMHG | DIASTOLIC BLOOD PRESSURE: 80 MMHG

## 2022-09-13 DIAGNOSIS — K21.9 GASTRO-ESOPHAGEAL REFLUX DISEASE W/OUT ESOPHAGITIS: ICD-10-CM

## 2022-09-13 DIAGNOSIS — K76.0 FATTY (CHANGE OF) LIVER, NOT ELSEWHERE CLASSIFIED: ICD-10-CM

## 2022-09-13 PROCEDURE — 90662 IIV NO PRSV INCREASED AG IM: CPT

## 2022-09-13 PROCEDURE — G0008: CPT

## 2022-09-13 PROCEDURE — G0439: CPT

## 2022-09-13 PROCEDURE — 36415 COLL VENOUS BLD VENIPUNCTURE: CPT

## 2022-09-13 RX ORDER — METOPROLOL SUCCINATE 25 MG/1
25 TABLET, EXTENDED RELEASE ORAL
Qty: 90 | Refills: 3 | Status: DISCONTINUED | COMMUNITY
Start: 2021-07-21 | End: 2022-09-13

## 2022-09-13 RX ORDER — HYDROCHLOROTHIAZIDE 25 MG/1
25 TABLET ORAL
Qty: 90 | Refills: 3 | Status: DISCONTINUED | COMMUNITY
Start: 2021-12-16 | End: 2022-09-13

## 2022-09-13 RX ORDER — CICLESONIDE 50 UG/1
50 SPRAY NASAL DAILY
Qty: 3 | Refills: 3 | Status: DISCONTINUED | COMMUNITY
Start: 2018-12-17 | End: 2022-09-13

## 2022-09-13 RX ORDER — FAMOTIDINE 40 MG/1
40 TABLET, FILM COATED ORAL
Qty: 90 | Refills: 1 | Status: DISCONTINUED | COMMUNITY
Start: 2021-08-17 | End: 2022-09-13

## 2022-09-13 RX ORDER — MONTELUKAST 10 MG/1
10 TABLET, FILM COATED ORAL
Qty: 30 | Refills: 5 | Status: DISCONTINUED | COMMUNITY
Start: 2021-10-12 | End: 2022-09-13

## 2022-09-13 NOTE — HEALTH RISK ASSESSMENT
[Very Good] : ~his/her~  mood as very good [No] : In the past 12 months have you used drugs other than those required for medical reasons? No [No falls in past year] : Patient reported no falls in the past year [0] : 2) Feeling down, depressed, or hopeless: Not at all (0) [PHQ-2 Negative - No further assessment needed] : PHQ-2 Negative - No further assessment needed [Patient reported colonoscopy was normal] : Patient reported colonoscopy was normal [With Family] : lives with family [Retired] : retired [] :  [# Of Children ___] : has [unfilled] children [Sexually Active] : sexually active [Fully functional (bathing, dressing, toileting, transferring, walking, feeding)] : Fully functional (bathing, dressing, toileting, transferring, walking, feeding) [Fully functional (using the telephone, shopping, preparing meals, housekeeping, doing laundry, using] : Fully functional and needs no help or supervision to perform IADLs (using the telephone, shopping, preparing meals, housekeeping, doing laundry, using transportation, managing medications and managing finances) [Smoke Detector] : smoke detector [Carbon Monoxide Detector] : carbon monoxide detector [Seat Belt] :  uses seat belt [Sunscreen] : uses sunscreen [Never] : Never [de-identified] : tennis 3x a week  [Reports changes in hearing] : Reports no changes in hearing [Reports changes in vision] : Reports no changes in vision [Reports changes in dental health] : Reports no changes in dental health [ColonoscopyDate] : 06/21 [ColonoscopyComments] : polyp, repeat 2024; Dr. Bentley

## 2022-09-13 NOTE — PLAN
[FreeTextEntry1] : Check routine fasting labs as above.\par Discussed diet, exercise, and weight maintenance. Exercises often with tennis.\par Flu shot given today. Pneumococcal vaccines UTD. Received COVID vaccine x 3.\par Prostate cancer screening with PSA.\par Colonoscopy 6/21 with polyps, repeat in 2024 with Dr. Bentley. \par Hypertension controlled. Continue losartan, HCTZ and metoprolol.\par Fatty liver - check LFT's. Follow-up with Dr. Domingo. \par F/u with Dr. Perlman for thyroid nodules.\par GERD stable, off Famotidine. \par Continue Azelastine for allergic rhinitis.\par \par RTO 6 months for BP check.

## 2022-09-13 NOTE — HISTORY OF PRESENT ILLNESS
[de-identified] : Patient comes for an annual exam.\par \par He reports feeling well.\par He had nose flap closure in 4/22. Biopsy of nose + for SCC. He saw surgical oncologist Dr. Felipe. Now seeing radiation oncologist, will be scheduled for RT. \par Home BP readings range 136-142/72-83.\par

## 2022-09-13 NOTE — PHYSICAL EXAM
Intact [No Acute Distress] : no acute distress [Well Developed] : well developed [Well-Appearing] : well-appearing [Normal Sclera/Conjunctiva] : normal sclera/conjunctiva [PERRL] : pupils equal round and reactive to light [EOMI] : extraocular movements intact [Normal Outer Ear/Nose] : the outer ears and nose were normal in appearance [Normal Oropharynx] : the oropharynx was normal [Normal TMs] : both tympanic membranes were normal [Supple] : supple [No Lymphadenopathy] : no lymphadenopathy [Thyroid Normal, No Nodules] : the thyroid was normal and there were no nodules present [No Respiratory Distress] : no respiratory distress  [Clear to Auscultation] : lungs were clear to auscultation bilaterally [Normal Rate] : normal rate  [Regular Rhythm] : with a regular rhythm [Normal S1, S2] : normal S1 and S2 [No Murmur] : no murmur heard [No Edema] : there was no peripheral edema [Soft] : abdomen soft [Non Tender] : non-tender [Non-distended] : non-distended [Normal Bowel Sounds] : normal bowel sounds [Normal Posterior Cervical Nodes] : no posterior cervical lymphadenopathy [Normal Anterior Cervical Nodes] : no anterior cervical lymphadenopathy [Grossly Normal Strength/Tone] : grossly normal strength/tone [No Rash] : no rash [Normal Gait] : normal gait [Coordination Grossly Intact] : coordination grossly intact [No Focal Deficits] : no focal deficits [Normal Mood] : the mood was normal [Normal Insight/Judgement] : insight and judgment were intact [No Spinal Tenderness] : no spinal tenderness [No Joint Swelling] : no joint swelling [de-identified] : friendly [de-identified] : nasal flap healed [de-identified] : defer to GI

## 2022-09-14 ENCOUNTER — TRANSCRIPTION ENCOUNTER (OUTPATIENT)
Age: 72
End: 2022-09-14

## 2022-09-14 LAB
25(OH)D3 SERPL-MCNC: 37.8 NG/ML
ALBUMIN SERPL ELPH-MCNC: 4.8 G/DL
ALP BLD-CCNC: 58 U/L
ALT SERPL-CCNC: 53 U/L
ANION GAP SERPL CALC-SCNC: 15 MMOL/L
APPEARANCE: CLEAR
AST SERPL-CCNC: 41 U/L
BACTERIA: NEGATIVE
BASOPHILS # BLD AUTO: 0.04 K/UL
BASOPHILS NFR BLD AUTO: 0.5 %
BILIRUB SERPL-MCNC: 2.2 MG/DL
BILIRUBIN URINE: NEGATIVE
BLOOD URINE: NEGATIVE
BUN SERPL-MCNC: 18 MG/DL
CALCIUM SERPL-MCNC: 9.7 MG/DL
CHLORIDE SERPL-SCNC: 101 MMOL/L
CHOLEST SERPL-MCNC: 181 MG/DL
CO2 SERPL-SCNC: 23 MMOL/L
COLOR: NORMAL
CREAT SERPL-MCNC: 1.11 MG/DL
EGFR: 72 ML/MIN/1.73M2
EOSINOPHIL # BLD AUTO: 0.26 K/UL
EOSINOPHIL NFR BLD AUTO: 3.2 %
ESTIMATED AVERAGE GLUCOSE: 108 MG/DL
GLUCOSE QUALITATIVE U: NEGATIVE
GLUCOSE SERPL-MCNC: 120 MG/DL
HBA1C MFR BLD HPLC: 5.4 %
HCT VFR BLD CALC: 45 %
HDLC SERPL-MCNC: 42 MG/DL
HGB BLD-MCNC: 15.3 G/DL
HYALINE CASTS: 0 /LPF
IMM GRANULOCYTES NFR BLD AUTO: 0.6 %
KETONES URINE: NEGATIVE
LDLC SERPL CALC-MCNC: 120 MG/DL
LEUKOCYTE ESTERASE URINE: NEGATIVE
LYMPHOCYTES # BLD AUTO: 2.04 K/UL
LYMPHOCYTES NFR BLD AUTO: 25.2 %
MAN DIFF?: NORMAL
MCHC RBC-ENTMCNC: 31.7 PG
MCHC RBC-ENTMCNC: 34 GM/DL
MCV RBC AUTO: 93.2 FL
MICROSCOPIC-UA: NORMAL
MONOCYTES # BLD AUTO: 0.87 K/UL
MONOCYTES NFR BLD AUTO: 10.7 %
NEUTROPHILS # BLD AUTO: 4.85 K/UL
NEUTROPHILS NFR BLD AUTO: 59.8 %
NITRITE URINE: NEGATIVE
NONHDLC SERPL-MCNC: 140 MG/DL
PH URINE: 6.5
PLATELET # BLD AUTO: 258 K/UL
POTASSIUM SERPL-SCNC: 3.8 MMOL/L
PROT SERPL-MCNC: 7.3 G/DL
PROTEIN URINE: NEGATIVE
PSA SERPL-MCNC: 1.39 NG/ML
RBC # BLD: 4.83 M/UL
RBC # FLD: 13.6 %
RED BLOOD CELLS URINE: 1 /HPF
SODIUM SERPL-SCNC: 139 MMOL/L
SPECIFIC GRAVITY URINE: 1.01
SQUAMOUS EPITHELIAL CELLS: 0 /HPF
TRIGL SERPL-MCNC: 99 MG/DL
TSH SERPL-ACNC: 6.48 UIU/ML
UROBILINOGEN URINE: NORMAL
VIT B12 SERPL-MCNC: 665 PG/ML
WBC # FLD AUTO: 8.11 K/UL
WHITE BLOOD CELLS URINE: 0 /HPF

## 2022-12-08 ENCOUNTER — RX RENEWAL (OUTPATIENT)
Age: 72
End: 2022-12-08

## 2022-12-23 ENCOUNTER — RX RENEWAL (OUTPATIENT)
Age: 72
End: 2022-12-23

## 2023-02-05 NOTE — DISCHARGE NOTE NURSING/CASE MANAGEMENT/SOCIAL WORK - NSDCVIVACCINE_GEN_ALL_CORE_FT
Subjective   Patient ID: Christian is a 77 year old male who presents for his Subsequent Annual Medicare Wellness Visit.    Chief Complaint   Patient presents with   • Medicare Wellness Visit     subsequent       Patient Answered Medicare HRA Screening Questions  1.) Do you have an Advance directive, living will, or power of  for health care document that contains your wishes for end of life care? No    2.) Would you like additional information on advance directives? Yes    3.) During the past 4 weeks, how would you rate your health? Fair    4.) During the past 4 weeks, what was the hardest physical activity you could do for at least 2 minutes? Light    5.) Do you do moderate to strenuous exercise (brisk walk) for about 20 minutes for 3 or more days per week? No, but I am active with housework or yard work (vacuuming, raking, etc.)    6.) How many servings of the following would you typically eat in a day?  Fruits and Vegetables (1 serving = 1 piece of fruit, 1/2 cup fruits or vegetables) 2-3 per day  High Fiber / Whole Grain Foods (1 serving = 1 cup cold cereal, 1/2 cup cooked cereal, 1 slice bread) 2-3 per day  Fried or High Fat Foods (1 serving = 1 Abraham, French Fries, chips, doughnut, fried chicken/fish) 1 per day  Sugar Sweetened Beverages (1 serving = 1 can or 12 oz cup of soda or juice) 1 per day    7.) Have you had a fall two or more times in the past year? No    8.) During the past 4 weeks, has your physical and emotional health limited your social activities with family, friends, neighbors, or other groups? Not at all    9.) Do you feel safe at home? Yes    10.) How often do you have trouble taking medicines the way you have been told to take them? I always take my prescribed medications    11.) Over the past 4 weeks how often have you experienced the following?  Bladder Control problems - (urine leaking)Never  Bowel control problems - Never  Teeth or Denture Problems - Never  Bodily pain -  Often  Tiredness or Fatigue - Often  Feeling stressed or overwhelmed - Never  Anger or frustration - Never  Problems with your hearing - Sometimes  Problems using the telephone - Never  Problems with your balance - Seldom  Driven/Ridden in a car without wearing your seatbelt - Never  Sexual Problems - Sometimes    12.) Do you need help with any of the following activities (bathing, grooming, feeding, toileting, getting out of bed/chairs)? None of these apply to me    13.) Do you need help with any of the following activities (going to places outside of walking distance, shopping, housework/laundry, preparing meals, handling own money)? None of these apply to me    14.) During the past 4 weeks, was someone available to help if you needed and wanted help? Yes, as much as I wanted    15.) How confident are you that you can control and manage most of your health problems? Very confident    Christian has a past medical history of COPD (chronic obstructive pulmonary disease) (CMS/[facility]) (12/2014), DJD (degenerative joint disease), BETH (dyspnea on exertion) (5/8/2013), ED (erectile dysfunction), H/O ETOH abuse (5/2013), Hyperlipidemia, Insomnia, and Wears glasses.    Christian has Alcohol abuse; Allergic rhinitis; ED (erectile dysfunction); Hypercholesterolemia; Prostatism; Former smoker; COPD mod-severe, FEV1 58% by PFTs 11/12; Insomnia, unspecified; Actinic keratoses, R auricle; Compulsive behavior; HTN (hypertension); Acne rosacea, on intermittent metrogel; and Coronary artery calcification seen on CAT scan on their problem list.    Christian has a past surgical history that includes Colonoscopy (2009); tonsillectomy; and CT Colonography (2009).    His family history includes Cancer in his father; Dementia/Alzheimers in his sister; Heart disease in his mother; Stroke in his mother.    Christian reports that he quit smoking about 12 years ago. His smoking use included cigarettes and cigars. He has a 40.00 pack-year smoking history. He  General has never used smokeless tobacco. He reports current alcohol use of about 14.0 standard drinks of alcohol per week. He reports that he does not use drugs.    Christian is allergic to cat dander and zocor [simvastatin].    Christian   Current Outpatient Medications   Medication Sig Dispense Refill   • mirtazapine (REMERON) 15 MG tablet Take 1 tablet by mouth nightly. 90 tablet 1   • ASPIRIN LOW DOSE 81 MG EC tablet TAKE 1 TABLET BY MOUTH DAILY 90 tablet 1   • fluticasone-umeclidinium-vilanterol (TRELEGY ELLIPTA) 100-62.5-25 MCG/INH inhaler Inhale 1 puff into the lungs daily. 30 each 11   • atorvastatin (LIPITOR) 20 MG tablet Take 1 tablet by mouth daily. 90 tablet 3   • PROAIR  (90 Base) MCG/ACT inhaler INHALE 2 PUFFS BY MOUTH EVERY 4 HOURS AS NEEDED FOR SHORTNESS OF BREATH OR WHEEZING 8.5 g 5   • metroNIDAZOLE (METROGEL) 0.75 % gel APPLY TO FACE DAILY FOR ROSACEA 45 g 5   • Oxygen 20-80 % Kit Inhale 0.5 L/min into the lungs nightly. Indications: COPD     • naproxen (NAPROSYN) 500 MG tablet Take 1 tablet by mouth daily. Takes aleve 90 tablet 3   • fish oil-omega-3 fatty acids 1000 MG CAPS Take  by mouth.     • Multiple Vitamin (MULTI-VITAMIN DAILY PO) Take  by mouth.       No current facility-administered medications for this visit.        [facility] [id number] [place], [place] - [id number] [place] [address] AT [facility] OF [facility]  [address] [facility] [address]  Phone: [phone] Fax: [phone]    [id number] - [facility] [address] AT [facility] [address]  [address] [facility] [address]  Phone: [phone] Fax: [phone]    [id number] - [place], [place] - [address] [facility] AT [facility] [address]  [address] [address]  Phone: [phone] Fax: [phone]      Patient Care Team:  Christian Mars DO as PCP - General (Student)  Omi Lockett MD (Dermatology)  Mckay Rodgers MD (Pulmonary  Medicine)  Miguel Angel Hunt MD (Gastroenterology)    Screenings  ADLs                 iADLs       Home Safety: feels safe at home    Depression PHQ2/9:  Little interest or pleasure in activity?: Not at all  Feeling down, depressed or hopeless?: Several days  Initial depression screening score:: 1   Depression screen took approximately 1-2 minutes         Depression assessment/plan: Depression screening is negative no further plan needed.     Cognitive/Functional Status:        Cognitive Assessment: no evidence of cognitive dysfunction by direct observation    STEADI-Fall Risk       Hearing Impairment:    Vision/Hearing Screening: No exam data present     Advanced care planning: Discussed with patient. Patient understand need and will complete forms.  Forms provided    Needed Screening/Treatment:   Immunizations reviewed and patient needs: Hepatitis B and Herpes Zoster     Needed follow up:  None    No acute concerns today. Pulm appt next week 6/16/20.    Review of Systems   Constitutional: Negative for activity change and appetite change.   Musculoskeletal: Positive for arthralgias (knees, chronic). Negative for back pain.   Psychiatric/Behavioral: Negative for agitation, behavioral problems, confusion, decreased concentration and suicidal ideas.       Objective   Vitals: There were no vitals taken for this visit.  There is no height or weight on file to calculate BMI.  BMI ASSESSMENT/PLAN:  Patient is overweight.    15 minutes of physical activity a day        Physical Exam  Neurological:      Mental Status: He is alert.   Psychiatric:         Attention and Perception: Attention normal.         Mood and Affect: Mood normal.         Speech: Speech normal.         Behavior: Behavior is cooperative.         Assessment   Problem List Items Addressed This Visit     None          Schedule follow up: in a year, at next annual exam    Screening schedule/checklist for next 5-10 years:  Health Maintenance Due   Topic Date  Due   • Hepatitis B Vaccine (1 of 3 - Risk 3-dose series) 12/09/1961   • Shingles Vaccine (1 of 2) 12/09/1994   • DTaP/Tdap/Td Vaccine (2 - Td) 07/28/2018   • Medicare Wellness 65+  05/01/2020   • Lung Cancer Screening  06/21/2020        A written education, counseling, referral, and plan for obtaining appropriate screening services has been given to patient. See patient instructions.     Patient was staffed with Dr Reyes.    Christian Mars,   06/12/20      This visit is being performed via phone to discuss Medicare Wellness Visit (subsequent)    Clinician Location: [facility] [address]    Christian is in [place] and his identity has been established.   He understands that we are limiting office visits due to the coronavirus pandemic and was informed that consent to treat includes permission to submit charges to his insurance. It was also shared that without being seen and evaluated in person, there is a risk that the information and/or assessment may be incomplete or inaccurate.  21-30 minutes were spent in this encounter.        No Vaccines Administered.

## 2023-02-15 ENCOUNTER — APPOINTMENT (OUTPATIENT)
Dept: SURGICAL ONCOLOGY | Facility: CLINIC | Age: 73
End: 2023-02-15
Payer: MEDICARE

## 2023-02-15 VITALS
DIASTOLIC BLOOD PRESSURE: 84 MMHG | HEIGHT: 73 IN | HEART RATE: 81 BPM | SYSTOLIC BLOOD PRESSURE: 166 MMHG | OXYGEN SATURATION: 98 % | BODY MASS INDEX: 32.2 KG/M2 | RESPIRATION RATE: 15 BRPM | WEIGHT: 243 LBS

## 2023-02-15 PROCEDURE — 99214 OFFICE O/P EST MOD 30 MIN: CPT

## 2023-02-28 NOTE — HISTORY OF PRESENT ILLNESS
[de-identified] : Patient is a 73 y/o male who presents a follow up visit for tip of nose keratoacanthoma. He underwent attempted Mohs surgery by Dr. George who had persistently positive deep margins and referred the pt. for wide excision.  \par He is status post wide excision with skin graft reconstruction with Dr. Clinton of plastic surgery on 4/7/22. \par He completed 6 weeks of radiation at [facility] which he tolerated well. \par \par Surgical Pathology (4/7/22): \par 1. Skin, superior margin nose, biopsy- Skin negative for carcinoma.\par 2. Skin, left lateral margin, biopsy- Skin negative for carcinoma.\par 3. Skin, right lateral margin, biopsy- Skin negative for carcinoma.\par 4. Skin, upper lip margin, biopsy- Skin negative for carcinoma.\par 5. Skin, left nasal vault, biopsy\par - Skin with minute focus suspicious for invasive squamous cell carcinoma, markedly obscured by inflammatory changes, granulation tissue and cautery artifact (see comment).\par - Cartilage negative for carcinoma.\par 6. Skin, right nasal vault, biopsy- Skin negative for carcinoma.\par 7. Skin, intracartilaginous septum, biopsy\par - Minute focus consistent with invasive squamous cell carcinoma, markedly obscured by inflammatory changes, granulation tissue and cautery artifact (see comment).\par 8. Skin, right nasal septum, biopsy- Skin negative for carcinoma.\par 9. Skin, intracartilaginous septum, biopsy- Fibrous tissue negative for carcinoma.\par \par Dermatopathology report (3/16/22):\par A. Left Tip of Nose:\par -Surface of acanthotic squamous proliferation, transected.\par \par Patient's past medical history is significant for HTN, Blood clots, Fatty liver. \par He has hx of squamous and basal cell carcinomas. \par Covid vaccinated w/ booster dose.

## 2023-02-28 NOTE — ASSESSMENT
[FreeTextEntry1] : S/p wide excision w/ skin graft of nose keratoacanthoma\par Margins negative on final pathology\par Completed 6 wks of radiation therapy \par Continue dermatologic surveillance\par RTO 6 months\par

## 2023-02-28 NOTE — ADDENDUM
[FreeTextEntry1] : I, Annette Boyd, acted solely as a scribe for Dr. Hayes Felipe on this date 02/15/2023.\par \par

## 2023-02-28 NOTE — PHYSICAL EXAM
[Normal] : supple, no neck mass and thyroid not enlarged [Normal Neck Lymph Nodes] : normal neck lymph nodes  [Normal Supraclavicular Lymph Nodes] : normal supraclavicular lymph nodes [Normal Groin Lymph Nodes] : normal groin lymph nodes [Normal Axillary Lymph Nodes] : normal axillary lymph nodes [Normal] : oriented to person, place and time, with appropriate affect [de-identified] : nasolabial flaps well healed. no evidence of infection.

## 2023-03-06 ENCOUNTER — RX RENEWAL (OUTPATIENT)
Age: 73
End: 2023-03-06

## 2023-03-20 ENCOUNTER — RX RENEWAL (OUTPATIENT)
Age: 73
End: 2023-03-20

## 2023-03-30 ENCOUNTER — APPOINTMENT (OUTPATIENT)
Dept: INTERNAL MEDICINE | Facility: CLINIC | Age: 73
End: 2023-03-30
Payer: MEDICARE

## 2023-03-30 VITALS
BODY MASS INDEX: 32.07 KG/M2 | HEART RATE: 84 BPM | DIASTOLIC BLOOD PRESSURE: 86 MMHG | OXYGEN SATURATION: 98 % | HEIGHT: 73 IN | WEIGHT: 242 LBS | SYSTOLIC BLOOD PRESSURE: 144 MMHG | TEMPERATURE: 98.2 F

## 2023-03-30 VITALS — SYSTOLIC BLOOD PRESSURE: 120 MMHG | DIASTOLIC BLOOD PRESSURE: 70 MMHG

## 2023-03-30 PROBLEM — Z82.49 FAMILY HISTORY OF CORONARY ARTERY DISEASE: Status: ACTIVE | Noted: 2023-03-30

## 2023-03-30 PROCEDURE — 36415 COLL VENOUS BLD VENIPUNCTURE: CPT

## 2023-03-30 PROCEDURE — 99214 OFFICE O/P EST MOD 30 MIN: CPT | Mod: 25

## 2023-03-30 NOTE — REVIEW OF SYSTEMS
[Negative] : Heme/Lymph [Recent Change In Weight] : ~T no recent weight change [Itching] : no itching

## 2023-03-30 NOTE — HISTORY OF PRESENT ILLNESS
[FreeTextEntry1] : HTN / BP  check [de-identified] : Patient comes for 6 month follow-up and BP check.\par \par He reports feeling well. Has no complaints. \par He has been exercising often with tennis and walking daily. Denies cp or SOB.\par Compliant with medications.\par He would like to have CT heart calcium score. His father had MI in his 80's.\par

## 2023-03-30 NOTE — PLAN
[FreeTextEntry1] : Hypertension is controlled. Continue Metoprolol, losartan and HCTZ. Low salt.\par Has PRICE and Gilbert's. Followed with Dr. Domingo. Check LFT's. Check A1c and lipids.\par \par Return to office in 6 months for physical.

## 2023-03-30 NOTE — HISTORY OF PRESENT ILLNESS
[FreeTextEntry1] : HTN / BP  check [de-identified] : Patient comes for 6 month follow-up and BP check.\par \par He reports feeling well. Has no complaints. \par Home BP readings have been elevated, ranging 140-160/79-89. Compliant with medications.\par \par

## 2023-03-30 NOTE — PHYSICAL EXAM
[No Acute Distress] : no acute distress [Well Developed] : well developed [Well-Appearing] : well-appearing [Normal Sclera/Conjunctiva] : normal sclera/conjunctiva [PERRL] : pupils equal round and reactive to light [EOMI] : extraocular movements intact [Normal Outer Ear/Nose] : the outer ears and nose were normal in appearance [Normal Oropharynx] : the oropharynx was normal [Normal TMs] : both tympanic membranes were normal [Supple] : supple [No Lymphadenopathy] : no lymphadenopathy [Thyroid Normal, No Nodules] : the thyroid was normal and there were no nodules present [No Respiratory Distress] : no respiratory distress  [Clear to Auscultation] : lungs were clear to auscultation bilaterally [Normal Rate] : normal rate  [Regular Rhythm] : with a regular rhythm [Normal S1, S2] : normal S1 and S2 [No Murmur] : no murmur heard [No Edema] : there was no peripheral edema [Soft] : abdomen soft [Non Tender] : non-tender [No Masses] : no abdominal mass palpated [Normal Posterior Cervical Nodes] : no posterior cervical lymphadenopathy [Normal Anterior Cervical Nodes] : no anterior cervical lymphadenopathy [Grossly Normal Strength/Tone] : grossly normal strength/tone [Normal Gait] : normal gait [Coordination Grossly Intact] : coordination grossly intact [Normal Mood] : the mood was normal [Normal Insight/Judgement] : insight and judgment were intact [de-identified] : friendly [de-identified] : scattered erythematous rash on face (mask distribution)

## 2023-03-30 NOTE — PHYSICAL EXAM
[No Acute Distress] : no acute distress [Well Developed] : well developed [Well-Appearing] : well-appearing [Supple] : supple [No Lymphadenopathy] : no lymphadenopathy [Thyroid Normal, No Nodules] : the thyroid was normal and there were no nodules present [No Respiratory Distress] : no respiratory distress  [Clear to Auscultation] : lungs were clear to auscultation bilaterally [Normal Rate] : normal rate  [Regular Rhythm] : with a regular rhythm [Normal S1, S2] : normal S1 and S2 [No Murmur] : no murmur heard [No Edema] : there was no peripheral edema [Soft] : abdomen soft [Non Tender] : non-tender [Grossly Normal Strength/Tone] : grossly normal strength/tone [Normal Gait] : normal gait [Normal Mood] : the mood was normal [Normal Insight/Judgement] : insight and judgment were intact [No Rash] : no rash [de-identified] : friendly

## 2023-03-30 NOTE — PLAN
[FreeTextEntry1] : Hypertension is well-controlled. Continue Metoprolol, losartan and HCTZ. Check A1c and lipids.\par Check CT heart calcium score r/o CAD.\par Has PRICE and Gilbert's. Check LFT's. \par Pt to schedule MRCP in 6/23 to monitor pancreatic cysts.\par \par Return to office in 6 months for physical.

## 2023-03-31 ENCOUNTER — TRANSCRIPTION ENCOUNTER (OUTPATIENT)
Age: 73
End: 2023-03-31

## 2023-03-31 LAB
ALBUMIN SERPL ELPH-MCNC: 4.6 G/DL
ALP BLD-CCNC: 56 U/L
ALT SERPL-CCNC: 49 U/L
ANION GAP SERPL CALC-SCNC: 12 MMOL/L
AST SERPL-CCNC: 39 U/L
BILIRUB SERPL-MCNC: 2.4 MG/DL
BUN SERPL-MCNC: 16 MG/DL
CALCIUM SERPL-MCNC: 9.7 MG/DL
CHLORIDE SERPL-SCNC: 99 MMOL/L
CHOLEST SERPL-MCNC: 169 MG/DL
CO2 SERPL-SCNC: 26 MMOL/L
CREAT SERPL-MCNC: 1.12 MG/DL
EGFR: 71 ML/MIN/1.73M2
ESTIMATED AVERAGE GLUCOSE: 108 MG/DL
GLUCOSE SERPL-MCNC: 112 MG/DL
HBA1C MFR BLD HPLC: 5.4 %
HDLC SERPL-MCNC: 37 MG/DL
LDLC SERPL CALC-MCNC: 102 MG/DL
NONHDLC SERPL-MCNC: 132 MG/DL
POTASSIUM SERPL-SCNC: 4.2 MMOL/L
PROT SERPL-MCNC: 7.3 G/DL
SODIUM SERPL-SCNC: 138 MMOL/L
TRIGL SERPL-MCNC: 149 MG/DL

## 2023-04-17 ENCOUNTER — APPOINTMENT (OUTPATIENT)
Dept: CT IMAGING | Facility: CLINIC | Age: 73
End: 2023-04-17
Payer: SELF-PAY

## 2023-04-17 PROCEDURE — 75571 CT HRT W/O DYE W/CA TEST: CPT

## 2023-04-23 ENCOUNTER — TRANSCRIPTION ENCOUNTER (OUTPATIENT)
Age: 73
End: 2023-04-23

## 2023-04-23 DIAGNOSIS — R93.1 ABNORMAL FINDINGS ON DIAGNOSTIC IMAGING OF HEART AND CORONARY CIRCULATION: ICD-10-CM

## 2023-06-15 ENCOUNTER — OUTPATIENT (OUTPATIENT)
Dept: OUTPATIENT SERVICES | Facility: HOSPITAL | Age: 73
LOS: 1 days | End: 2023-06-15
Payer: MEDICARE

## 2023-06-15 ENCOUNTER — APPOINTMENT (OUTPATIENT)
Dept: MRI IMAGING | Facility: CLINIC | Age: 73
End: 2023-06-15
Payer: MEDICARE

## 2023-06-15 DIAGNOSIS — Z98.890 OTHER SPECIFIED POSTPROCEDURAL STATES: Chronic | ICD-10-CM

## 2023-06-15 DIAGNOSIS — K86.2 CYST OF PANCREAS: ICD-10-CM

## 2023-06-15 DIAGNOSIS — Z86.018 PERSONAL HISTORY OF OTHER BENIGN NEOPLASM: Chronic | ICD-10-CM

## 2023-06-15 PROCEDURE — 74183 MRI ABD W/O CNTR FLWD CNTR: CPT | Mod: 26,MH

## 2023-06-15 PROCEDURE — A9585: CPT

## 2023-06-15 PROCEDURE — 74183 MRI ABD W/O CNTR FLWD CNTR: CPT

## 2023-07-27 ENCOUNTER — APPOINTMENT (OUTPATIENT)
Dept: GASTROENTEROLOGY | Facility: CLINIC | Age: 73
End: 2023-07-27
Payer: MEDICARE

## 2023-07-27 VITALS
WEIGHT: 243 LBS | OXYGEN SATURATION: 99 % | TEMPERATURE: 98.1 F | HEART RATE: 79 BPM | SYSTOLIC BLOOD PRESSURE: 154 MMHG | BODY MASS INDEX: 32.2 KG/M2 | HEIGHT: 73 IN | DIASTOLIC BLOOD PRESSURE: 78 MMHG

## 2023-07-27 DIAGNOSIS — K86.2 CYST OF PANCREAS: ICD-10-CM

## 2023-07-27 PROCEDURE — 99213 OFFICE O/P EST LOW 20 MIN: CPT

## 2023-08-05 PROBLEM — K86.2 PANCREAS CYST: Status: ACTIVE | Noted: 2020-05-13

## 2023-08-05 NOTE — PHYSICAL EXAM
[Alert] : alert [Sclera] : the sclera and conjunctiva were normal [de-identified] : soft, ND, NT, positive bowel sounds

## 2023-08-05 NOTE — ASSESSMENT
[FreeTextEntry1] : Impression:  #1. Acute pancreatitis, April 2020, may have been due to passed stone, no choledocholithiasis on MRCP or serial EUS. Jaundice resolved.  #2. 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 Imaging in 2021 demonstrates stability of remaining cysts, which are likely IPMN, maximum size 1.3 cm.  Stable MRI/MRCP June 2022, maximum size 1.4 cm  Stable MRI/MRCP June 2023, maximum size 1.3 cm.  #3. Resolved elevated CA 19-9, still normal in 2022.  #4.  Colon cancer screening.  Recommendations:  #1.  MRI/MRCP in June 2024 at 1 year interval.  #2.  Office follow-up in 1 year.  #3.   Follow-up with primary gastroenterologist Dr. Bentley for colonoscopy

## 2023-08-05 NOTE — CONSULT LETTER
[Dear  ___] : Dear  [unfilled], [Consult Letter:] : I had the pleasure of evaluating your patient, [unfilled]. [Please see my note below.] : Please see my note below. [Consult Closing:] : Thank you very much for allowing me to participate in the care of this patient.  If you have any questions, please do not hesitate to contact me. [Sincerely,] : Sincerely, [FreeTextEntry3] : Stephen Banks MD, MPH, MELONIE, VARGHESEG Chief of Clinical Quality in Gastroenterology, [facility] Chief of Gastroenterology, [facility] Interim Director of Endoscopy, [facility] Director of Endoscopic Ultrasound, [address], [address] [facility], [address] 24 hours [phone]

## 2023-08-05 NOTE — HISTORY OF PRESENT ILLNESS
[FreeTextEntry1] : Patient follows up for pancreatic cysts  Patient asymptomatic.  No fever, abd pain, n/v, jaundice, weight loss, chronic diarrhea  Interval MRI/MRCP with and without contrast in June 2023, images and report personally reviewed.  Multiple stable pancreatic cysts.  Measuring up to 1.3 cm.

## 2023-08-30 ENCOUNTER — RX RENEWAL (OUTPATIENT)
Age: 73
End: 2023-08-30

## 2023-09-06 ENCOUNTER — APPOINTMENT (OUTPATIENT)
Dept: SURGICAL ONCOLOGY | Facility: CLINIC | Age: 73
End: 2023-09-06
Payer: MEDICARE

## 2023-09-06 VITALS
WEIGHT: 244 LBS | RESPIRATION RATE: 16 BRPM | HEIGHT: 73 IN | SYSTOLIC BLOOD PRESSURE: 157 MMHG | DIASTOLIC BLOOD PRESSURE: 83 MMHG | HEART RATE: 80 BPM | OXYGEN SATURATION: 98 % | BODY MASS INDEX: 32.34 KG/M2

## 2023-09-06 PROCEDURE — 99214 OFFICE O/P EST MOD 30 MIN: CPT

## 2023-09-06 NOTE — ADDENDUM
[FreeTextEntry1] : I, Annette Boyd, acted solely as a scribe for Dr. Hayes Felipe on this date 09/06/2023.

## 2023-09-06 NOTE — PHYSICAL EXAM
[Normal] : supple, no neck mass and thyroid not enlarged [Normal Neck Lymph Nodes] : normal neck lymph nodes  [Normal Supraclavicular Lymph Nodes] : normal supraclavicular lymph nodes [Normal Groin Lymph Nodes] : normal groin lymph nodes [Normal Axillary Lymph Nodes] : normal axillary lymph nodes [Normal] : oriented to person, place and time, with appropriate affect [de-identified] : nasolabial flaps well healed. no evidence of infection.

## 2023-09-06 NOTE — ASSESSMENT
[FreeTextEntry1] : S/p wide excision w/ skin graft of nose keratoacanthoma Margins negative on final pathology S/p radiation therapy  Continue dermatologic surveillance RTO 6 months

## 2023-09-06 NOTE — HISTORY OF PRESENT ILLNESS
[de-identified] : Patient is a 71 y/o male who presents a follow up visit for tip of nose keratoacanthoma. He underwent attempted Mohs surgery by Dr. George who had persistently positive deep margins and referred the pt. for wide excision.   He is status post wide excision with skin graft reconstruction with Dr. Clinton of plastic surgery on 4/7/22.  He completed 6 weeks of radiation at [place] which he tolerated well.  Continues to see derm Dr. Meade for surveillance, last visit a few weeks ago, no new biopsies, denies any new or changing skin lesions.  Surgical Pathology (4/7/22):  1. Skin, superior margin nose, biopsy- Skin negative for carcinoma. 2. Skin, left lateral margin, biopsy- Skin negative for carcinoma. 3. Skin, right lateral margin, biopsy- Skin negative for carcinoma. 4. Skin, upper lip margin, biopsy- Skin negative for carcinoma. 5. Skin, left nasal vault, biopsy - Skin with minute focus suspicious for invasive squamous cell carcinoma, markedly obscured by inflammatory changes, granulation tissue and cautery artifact (see comment). - Cartilage negative for carcinoma. 6. Skin, right nasal vault, biopsy- Skin negative for carcinoma. 7. Skin, intracartilaginous septum, biopsy - Minute focus consistent with invasive squamous cell carcinoma, markedly obscured by inflammatory changes, granulation tissue and cautery artifact (see comment). 8. Skin, right nasal septum, biopsy- Skin negative for carcinoma. 9. Skin, intracartilaginous septum, biopsy- Fibrous tissue negative for carcinoma.  Dermatopathology report (3/16/22): A. Left Tip of Nose: -Surface of acanthotic squamous proliferation, transected.  Patient's past medical history is significant for HTN, Blood clots, Fatty liver.  He has hx of squamous and basal cell carcinomas.  Covid vaccinated w/ booster dose.

## 2023-09-23 ENCOUNTER — RX RENEWAL (OUTPATIENT)
Age: 73
End: 2023-09-23

## 2023-10-05 ENCOUNTER — LABORATORY RESULT (OUTPATIENT)
Age: 73
End: 2023-10-05

## 2023-10-05 ENCOUNTER — APPOINTMENT (OUTPATIENT)
Dept: INTERNAL MEDICINE | Facility: CLINIC | Age: 73
End: 2023-10-05
Payer: MEDICARE

## 2023-10-05 VITALS
DIASTOLIC BLOOD PRESSURE: 87 MMHG | RESPIRATION RATE: 12 BRPM | HEART RATE: 70 BPM | WEIGHT: 247 LBS | OXYGEN SATURATION: 98 % | HEIGHT: 73 IN | SYSTOLIC BLOOD PRESSURE: 151 MMHG | BODY MASS INDEX: 32.74 KG/M2

## 2023-10-05 VITALS — SYSTOLIC BLOOD PRESSURE: 128 MMHG | DIASTOLIC BLOOD PRESSURE: 62 MMHG

## 2023-10-05 DIAGNOSIS — I25.84 ATHEROSCLEROTIC HEART DISEASE OF NATIVE CORONARY ARTERY W/OUT ANGINA PECTORIS: ICD-10-CM

## 2023-10-05 DIAGNOSIS — R79.89 OTHER SPECIFIED ABNORMAL FINDINGS OF BLOOD CHEMISTRY: ICD-10-CM

## 2023-10-05 DIAGNOSIS — I25.10 ATHEROSCLEROTIC HEART DISEASE OF NATIVE CORONARY ARTERY W/OUT ANGINA PECTORIS: ICD-10-CM

## 2023-10-05 LAB
ALBUMIN SERPL ELPH-MCNC: 4.6 G/DL
ALP BLD-CCNC: 52 U/L
ALT SERPL-CCNC: 68 U/L
ANION GAP SERPL CALC-SCNC: 11 MMOL/L
AST SERPL-CCNC: 49 U/L
BASOPHILS # BLD AUTO: 0.05 K/UL
BASOPHILS NFR BLD AUTO: 0.7 %
BILIRUB SERPL-MCNC: 2.4 MG/DL
BUN SERPL-MCNC: 17 MG/DL
CALCIUM SERPL-MCNC: 10 MG/DL
CHLORIDE SERPL-SCNC: 100 MMOL/L
CHOLEST SERPL-MCNC: 168 MG/DL
CO2 SERPL-SCNC: 29 MMOL/L
CREAT SERPL-MCNC: 1.21 MG/DL
EGFR: 64 ML/MIN/1.73M2
EOSINOPHIL # BLD AUTO: 0.3 K/UL
EOSINOPHIL NFR BLD AUTO: 4.5 %
ESTIMATED AVERAGE GLUCOSE: 105 MG/DL
GLUCOSE SERPL-MCNC: 121 MG/DL
HBA1C MFR BLD HPLC: 5.3 %
HCT VFR BLD CALC: 44.1 %
HDLC SERPL-MCNC: 36 MG/DL
HGB BLD-MCNC: 15.2 G/DL
IMM GRANULOCYTES NFR BLD AUTO: 0.7 %
LDLC SERPL CALC-MCNC: 107 MG/DL
LYMPHOCYTES # BLD AUTO: 2.21 K/UL
LYMPHOCYTES NFR BLD AUTO: 33 %
MAN DIFF?: NORMAL
MCHC RBC-ENTMCNC: 31.9 PG
MCHC RBC-ENTMCNC: 34.5 GM/DL
MCV RBC AUTO: 92.6 FL
MONOCYTES # BLD AUTO: 0.56 K/UL
MONOCYTES NFR BLD AUTO: 8.4 %
NEUTROPHILS # BLD AUTO: 3.53 K/UL
NEUTROPHILS NFR BLD AUTO: 52.7 %
NONHDLC SERPL-MCNC: 131 MG/DL
PLATELET # BLD AUTO: 263 K/UL
POTASSIUM SERPL-SCNC: 5.1 MMOL/L
PROT SERPL-MCNC: 7.1 G/DL
PSA SERPL-MCNC: 1.47 NG/ML
RBC # BLD: 4.76 M/UL
RBC # FLD: 13.5 %
SODIUM SERPL-SCNC: 139 MMOL/L
TRIGL SERPL-MCNC: 137 MG/DL
TSH SERPL-ACNC: 5.88 UIU/ML
WBC # FLD AUTO: 6.7 K/UL

## 2023-10-05 PROCEDURE — 36415 COLL VENOUS BLD VENIPUNCTURE: CPT

## 2023-10-05 PROCEDURE — 90662 IIV NO PRSV INCREASED AG IM: CPT

## 2023-10-05 PROCEDURE — G0008: CPT

## 2023-10-05 PROCEDURE — 99214 OFFICE O/P EST MOD 30 MIN: CPT | Mod: 25

## 2023-11-08 RX ORDER — OLOPATADINE HCL 1 MG/ML
0.1 SOLUTION/ DROPS OPHTHALMIC TWICE DAILY
Qty: 1 | Refills: 3 | Status: ACTIVE | COMMUNITY
Start: 1900-01-01 | End: 1900-01-01

## 2023-11-08 RX ORDER — TADALAFIL 5 MG/1
5 TABLET ORAL
Qty: 90 | Refills: 3 | Status: ACTIVE | COMMUNITY
Start: 2018-07-25 | End: 1900-01-01

## 2023-11-25 ENCOUNTER — RX RENEWAL (OUTPATIENT)
Age: 73
End: 2023-11-25

## 2023-11-25 RX ORDER — LOSARTAN POTASSIUM 100 MG/1
100 TABLET, FILM COATED ORAL
Qty: 90 | Refills: 3 | Status: ACTIVE | COMMUNITY
Start: 2019-09-03 | End: 1900-01-01

## 2023-11-28 NOTE — DISCHARGE NOTE PROVIDER - PROVIDER TOKENS
Pt dropped [facility] application for his trulicity,  pls call pt if any questions   PROVIDER:[TOKEN:[[id number]],FOLLOWUP:[1 week],ESTABLISHEDPATIENT:[T]]

## 2023-12-14 ENCOUNTER — RX RENEWAL (OUTPATIENT)
Age: 73
End: 2023-12-14

## 2023-12-14 RX ORDER — HYDROCHLOROTHIAZIDE 50 MG/1
50 TABLET ORAL
Qty: 90 | Refills: 3 | Status: ACTIVE | COMMUNITY
Start: 2019-09-03 | End: 1900-01-01

## 2024-02-11 ENCOUNTER — NON-APPOINTMENT (OUTPATIENT)
Age: 74
End: 2024-02-11

## 2024-02-12 ENCOUNTER — EMERGENCY (EMERGENCY)
Facility: HOSPITAL | Age: 74
LOS: 1 days | Discharge: HOME | End: 2024-02-12
Attending: STUDENT IN AN ORGANIZED HEALTH CARE EDUCATION/TRAINING PROGRAM
Payer: MEDICARE

## 2024-02-12 VITALS
HEIGHT: 73 IN | RESPIRATION RATE: 20 BRPM | OXYGEN SATURATION: 96 % | TEMPERATURE: 97 F | HEART RATE: 63 BPM | SYSTOLIC BLOOD PRESSURE: 163 MMHG | DIASTOLIC BLOOD PRESSURE: 82 MMHG | WEIGHT: 240.08 LBS

## 2024-02-12 VITALS
OXYGEN SATURATION: 96 % | SYSTOLIC BLOOD PRESSURE: 168 MMHG | DIASTOLIC BLOOD PRESSURE: 94 MMHG | RESPIRATION RATE: 18 BRPM | HEART RATE: 77 BPM | TEMPERATURE: 98 F

## 2024-02-12 DIAGNOSIS — Z86.018 PERSONAL HISTORY OF OTHER BENIGN NEOPLASM: Chronic | ICD-10-CM

## 2024-02-12 DIAGNOSIS — Z98.890 OTHER SPECIFIED POSTPROCEDURAL STATES: Chronic | ICD-10-CM

## 2024-02-12 LAB
ALBUMIN SERPL ELPH-MCNC: 4.6 G/DL — SIGNIFICANT CHANGE UP (ref 3.3–5)
ALP SERPL-CCNC: 47 U/L — SIGNIFICANT CHANGE UP (ref 40–120)
ALT FLD-CCNC: 66 U/L — HIGH (ref 10–45)
ANION GAP SERPL CALC-SCNC: 13 MMOL/L — SIGNIFICANT CHANGE UP (ref 5–17)
AST SERPL-CCNC: 84 U/L — HIGH (ref 10–40)
BASOPHILS # BLD AUTO: 0.06 K/UL — SIGNIFICANT CHANGE UP (ref 0–0.2)
BASOPHILS NFR BLD AUTO: 0.6 % — SIGNIFICANT CHANGE UP (ref 0–2)
BILIRUB SERPL-MCNC: 2.4 MG/DL — HIGH (ref 0.2–1.2)
BUN SERPL-MCNC: 17 MG/DL — SIGNIFICANT CHANGE UP (ref 7–23)
CALCIUM SERPL-MCNC: 9.7 MG/DL — SIGNIFICANT CHANGE UP (ref 8.4–10.5)
CHLORIDE SERPL-SCNC: 102 MMOL/L — SIGNIFICANT CHANGE UP (ref 96–108)
CO2 SERPL-SCNC: 24 MMOL/L — SIGNIFICANT CHANGE UP (ref 22–31)
CREAT SERPL-MCNC: 0.97 MG/DL — SIGNIFICANT CHANGE UP (ref 0.5–1.3)
EGFR: 83 ML/MIN/1.73M2 — SIGNIFICANT CHANGE UP
EOSINOPHIL # BLD AUTO: 0.18 K/UL — SIGNIFICANT CHANGE UP (ref 0–0.5)
EOSINOPHIL NFR BLD AUTO: 1.8 % — SIGNIFICANT CHANGE UP (ref 0–6)
GLUCOSE SERPL-MCNC: 96 MG/DL — SIGNIFICANT CHANGE UP (ref 70–99)
HCT VFR BLD CALC: 44.4 % — SIGNIFICANT CHANGE UP (ref 39–50)
HGB BLD-MCNC: 15.4 G/DL — SIGNIFICANT CHANGE UP (ref 13–17)
IMM GRANULOCYTES NFR BLD AUTO: 0.7 % — SIGNIFICANT CHANGE UP (ref 0–0.9)
LIDOCAIN IGE QN: 28 U/L — SIGNIFICANT CHANGE UP (ref 7–60)
LYMPHOCYTES # BLD AUTO: 2.42 K/UL — SIGNIFICANT CHANGE UP (ref 1–3.3)
LYMPHOCYTES # BLD AUTO: 24.1 % — SIGNIFICANT CHANGE UP (ref 13–44)
MCHC RBC-ENTMCNC: 31.1 PG — SIGNIFICANT CHANGE UP (ref 27–34)
MCHC RBC-ENTMCNC: 34.7 GM/DL — SIGNIFICANT CHANGE UP (ref 32–36)
MCV RBC AUTO: 89.7 FL — SIGNIFICANT CHANGE UP (ref 80–100)
MONOCYTES # BLD AUTO: 0.78 K/UL — SIGNIFICANT CHANGE UP (ref 0–0.9)
MONOCYTES NFR BLD AUTO: 7.8 % — SIGNIFICANT CHANGE UP (ref 2–14)
NEUTROPHILS # BLD AUTO: 6.53 K/UL — SIGNIFICANT CHANGE UP (ref 1.8–7.4)
NEUTROPHILS NFR BLD AUTO: 65 % — SIGNIFICANT CHANGE UP (ref 43–77)
NRBC # BLD: 0 /100 WBCS — SIGNIFICANT CHANGE UP (ref 0–0)
PLATELET # BLD AUTO: 237 K/UL — SIGNIFICANT CHANGE UP (ref 150–400)
POTASSIUM SERPL-MCNC: 5.7 MMOL/L — HIGH (ref 3.5–5.3)
POTASSIUM SERPL-SCNC: 5.7 MMOL/L — HIGH (ref 3.5–5.3)
PROT SERPL-MCNC: 8.1 G/DL — SIGNIFICANT CHANGE UP (ref 6–8.3)
RBC # BLD: 4.95 M/UL — SIGNIFICANT CHANGE UP (ref 4.2–5.8)
RBC # FLD: 13.3 % — SIGNIFICANT CHANGE UP (ref 10.3–14.5)
SODIUM SERPL-SCNC: 139 MMOL/L — SIGNIFICANT CHANGE UP (ref 135–145)
WBC # BLD: 10.04 K/UL — SIGNIFICANT CHANGE UP (ref 3.8–10.5)
WBC # FLD AUTO: 10.04 K/UL — SIGNIFICANT CHANGE UP (ref 3.8–10.5)

## 2024-02-12 PROCEDURE — 99285 EMERGENCY DEPT VISIT HI MDM: CPT

## 2024-02-12 PROCEDURE — 74177 CT ABD & PELVIS W/CONTRAST: CPT | Mod: MA

## 2024-02-12 PROCEDURE — 83690 ASSAY OF LIPASE: CPT

## 2024-02-12 PROCEDURE — 74177 CT ABD & PELVIS W/CONTRAST: CPT | Mod: 26,MA

## 2024-02-12 PROCEDURE — 99284 EMERGENCY DEPT VISIT MOD MDM: CPT | Mod: 25

## 2024-02-12 PROCEDURE — 80053 COMPREHEN METABOLIC PANEL: CPT

## 2024-02-12 PROCEDURE — 85025 COMPLETE CBC W/AUTO DIFF WBC: CPT

## 2024-02-12 NOTE — ED ADULT NURSE REASSESSMENT NOTE - NS ED NURSE REASSESS COMMENT FT1
Patient seen by RN in qdoc. Seen by attending in the ED and discharged upon arrival to [facility]. not seen by this RN.

## 2024-02-12 NOTE — ED PROVIDER NOTE - PATIENT PORTAL LINK FT
You can access the FollowMyHealth Patient Portal offered by [facility] by registering at the following website: [email or web address]. By joining [employer]’s FollowMyHealth portal, you will also be able to view your health information using other applications (apps) compatible with our system.

## 2024-02-12 NOTE — ED PROVIDER NOTE - RAPID ASSESSMENT
74 y/o male PMHx HTN no PSx now presenting to the ED with band like constant epigastric abdominal pain with some nausea and lack of appetite for the last three weeks. Patient stated the pain is not exacerbated with food. Patient denied vomiting, fever, diarrhea, urinary symptoms, CP, SOB    RUEL Dutta: Patient seen by myself in triage area for rapid assessment only. Full H&P to be performed in main emergency room by ER providers.

## 2024-02-12 NOTE — ED ADULT NURSE NOTE - NSFALLUNIVINTERV_ED_ALL_ED
Bed/Stretcher in lowest position, wheels locked, appropriate side rails in place/Call bell, personal items and telephone in reach/Instruct patient to call for assistance before getting out of bed/chair/stretcher/Non-slip footwear applied when patient is off stretcher/[place] to call system/Physically safe environment - no spills, clutter or unnecessary equipment/Purposeful proactive rounding/Room/bathroom lighting operational, light cord in reach

## 2024-02-12 NOTE — ED PROVIDER NOTE - CLINICAL SUMMARY MEDICAL DECISION MAKING FREE TEXT BOX
74 y/o male PMHx HTN no PSx now presenting to the ED with band like constant epigastric abdominal pain with some nausea and lack of appetite for the last three weeks. Patient stated the pain is not exacerbated with food. Patient denied vomiting, fever, diarrhea, urinary symptoms, CP, SOB    PEx benign, no abd ttp. CT and lipase negative, no SBO, pancreatitis, biliary pathology at this time. Patient has excellent GI followup with Dr karen camp, has good outpatient pcp as well, f/u encouraged.

## 2024-02-12 NOTE — ED PROVIDER NOTE - OBJECTIVE STATEMENT
74 y/o male PMHx HTN no PSx now presenting to the ED with band like constant epigastric abdominal pain with some nausea and lack of appetite for the last three weeks. Patient stated the pain is not exacerbated with food. Patient denied vomiting, fever, diarrhea, urinary symptoms, CP, SOB

## 2024-02-23 NOTE — SBIRT NOTE ADULT - NSSBIRTSAVECONSULT_GEN_A_CORE
Called patient and notified her that unfortunately her CBC was not collected. I reinforced her best route of care is through the emergency department given the rate of bleeding she has described and concern for developing severe anemia. I shared risks of significant morbidity risk and mortality and she expressed understanding. She agrees to call 911 if developing red flag signs/symptoms but declines to proceed to emergency department tonight. She will try to get labs re-drawn tomorrow.
Active

## 2024-03-14 ENCOUNTER — RX RENEWAL (OUTPATIENT)
Age: 74
End: 2024-03-14

## 2024-03-14 RX ORDER — METOPROLOL SUCCINATE 50 MG/1
50 TABLET, EXTENDED RELEASE ORAL
Qty: 90 | Refills: 3 | Status: ACTIVE | COMMUNITY
Start: 2018-11-14 | End: 1900-01-01

## 2024-04-13 PROBLEM — E66.9 OBESITY (BMI 30-39.9): Status: ACTIVE | Noted: 2024-04-13

## 2024-04-13 PROBLEM — D49.0 IPMN (INTRADUCTAL PAPILLARY MUCINOUS NEOPLASM): Status: ACTIVE | Noted: 2020-07-19

## 2024-04-13 PROBLEM — I10 BENIGN ESSENTIAL HYPERTENSION: Status: ACTIVE | Noted: 2018-07-25

## 2024-04-13 PROBLEM — E04.1 THYROID NODULE: Status: ACTIVE | Noted: 2018-07-25

## 2024-04-13 PROBLEM — K76.0 NAFLD (NONALCOHOLIC FATTY LIVER DISEASE): Status: ACTIVE | Noted: 2017-10-18

## 2024-04-13 PROBLEM — N40.0 BPH (BENIGN PROSTATIC HYPERPLASIA): Status: ACTIVE | Noted: 2018-07-25

## 2024-04-15 ENCOUNTER — APPOINTMENT (OUTPATIENT)
Dept: INTERNAL MEDICINE | Facility: CLINIC | Age: 74
End: 2024-04-15
Payer: MEDICARE

## 2024-04-15 ENCOUNTER — NON-APPOINTMENT (OUTPATIENT)
Age: 74
End: 2024-04-15

## 2024-04-15 ENCOUNTER — LABORATORY RESULT (OUTPATIENT)
Age: 74
End: 2024-04-15

## 2024-04-15 VITALS
BODY MASS INDEX: 32.39 KG/M2 | SYSTOLIC BLOOD PRESSURE: 170 MMHG | WEIGHT: 244.38 LBS | DIASTOLIC BLOOD PRESSURE: 81 MMHG | OXYGEN SATURATION: 97 % | RESPIRATION RATE: 12 BRPM | HEIGHT: 73 IN | HEART RATE: 71 BPM

## 2024-04-15 VITALS — SYSTOLIC BLOOD PRESSURE: 136 MMHG | DIASTOLIC BLOOD PRESSURE: 70 MMHG

## 2024-04-15 DIAGNOSIS — E04.1 NONTOXIC SINGLE THYROID NODULE: ICD-10-CM

## 2024-04-15 DIAGNOSIS — I10 ESSENTIAL (PRIMARY) HYPERTENSION: ICD-10-CM

## 2024-04-15 DIAGNOSIS — Z00.00 ENCOUNTER FOR GENERAL ADULT MEDICAL EXAMINATION W/OUT ABNORMAL FINDINGS: ICD-10-CM

## 2024-04-15 DIAGNOSIS — N40.0 BENIGN PROSTATIC HYPERPLASIA WITHOUT LOWER URINARY TRACT SYMPMS: ICD-10-CM

## 2024-04-15 DIAGNOSIS — D49.0 NEOPLASM OF UNSPECIFIED BEHAVIOR OF DIGESTIVE SYSTEM: ICD-10-CM

## 2024-04-15 DIAGNOSIS — K76.0 FATTY (CHANGE OF) LIVER, NOT ELSEWHERE CLASSIFIED: ICD-10-CM

## 2024-04-15 DIAGNOSIS — E66.9 OBESITY, UNSPECIFIED: ICD-10-CM

## 2024-04-15 PROCEDURE — G0439: CPT

## 2024-04-15 PROCEDURE — G0447 BEHAVIOR COUNSEL OBESITY 15M: CPT

## 2024-04-15 PROCEDURE — 93000 ELECTROCARDIOGRAM COMPLETE: CPT

## 2024-04-15 RX ORDER — AZELASTINE HYDROCHLORIDE 137 UG/1
0.1 SPRAY, METERED NASAL TWICE DAILY
Qty: 3 | Refills: 2 | Status: ACTIVE | COMMUNITY
Start: 2021-10-06 | End: 1900-01-01

## 2024-04-15 RX ORDER — FLUOROURACIL 50 MG/G
5 CREAM TOPICAL
Qty: 1 | Refills: 0 | Status: DISCONTINUED | COMMUNITY
Start: 2021-03-23 | End: 2024-04-15

## 2024-04-15 NOTE — PLAN
[FreeTextEntry1] : Check routine fasting labs as above. Discussed diet, exercise, and weight loss efforts. Flu shot in Fall. Recommend Shingrix.  Prostate cancer screening with PSA. Colonoscopy 6/21 with polyps, repeat now with Dr. Bentley.  Hypertension - BP controlled. Continue losartan, HCTZ and metoprolol. Fatty liver - check LFT's. Consider fibroscan with GI. F/u with Dr. Perlman for thyroid nodules. Check TFT's. GERD stable, off Famotidine.  Continue Azelastine for allergic rhinitis. Rx renewed.   RTO 6 months for BP check.

## 2024-04-15 NOTE — HEALTH RISK ASSESSMENT
[Very Good] : ~his/her~  mood as very good [No] : In the past 12 months have you used drugs other than those required for medical reasons? No [No falls in past year] : Patient reported no falls in the past year [0] : 2) Feeling down, depressed, or hopeless: Not at all (0) [PHQ-2 Negative - No further assessment needed] : PHQ-2 Negative - No further assessment needed [Patient reported colonoscopy was normal] : Patient reported colonoscopy was normal [With Family] : lives with family [Retired] : retired [] :  [# Of Children ___] : has [unfilled] children [Sexually Active] : sexually active [Fully functional (bathing, dressing, toileting, transferring, walking, feeding)] : Fully functional (bathing, dressing, toileting, transferring, walking, feeding) [Fully functional (using the telephone, shopping, preparing meals, housekeeping, doing laundry, using] : Fully functional and needs no help or supervision to perform IADLs (using the telephone, shopping, preparing meals, housekeeping, doing laundry, using transportation, managing medications and managing finances) [Smoke Detector] : smoke detector [Carbon Monoxide Detector] : carbon monoxide detector [Seat Belt] :  uses seat belt [Sunscreen] : uses sunscreen [Never] : Never [Excellent] : ~his/her~  mood as  excellent [de-identified] : tennis 3x a week, hiking [Reports changes in hearing] : Reports no changes in hearing [Reports changes in vision] : Reports no changes in vision [Reports changes in dental health] : Reports no changes in dental health [ColonoscopyDate] : 06/21 [ColonoscopyComments] : polyp, repeat 2024; Dr. Bentley

## 2024-04-15 NOTE — PHYSICAL EXAM
[No Acute Distress] : no acute distress [Well Developed] : well developed [Well-Appearing] : well-appearing [Normal Sclera/Conjunctiva] : normal sclera/conjunctiva [PERRL] : pupils equal round and reactive to light [EOMI] : extraocular movements intact [Normal Outer Ear/Nose] : the outer ears and nose were normal in appearance [Normal Oropharynx] : the oropharynx was normal [Normal TMs] : both tympanic membranes were normal [Supple] : supple [No Lymphadenopathy] : no lymphadenopathy [Thyroid Normal, No Nodules] : the thyroid was normal and there were no nodules present [No Respiratory Distress] : no respiratory distress  [Clear to Auscultation] : lungs were clear to auscultation bilaterally [Normal Rate] : normal rate  [Regular Rhythm] : with a regular rhythm [Normal S1, S2] : normal S1 and S2 [No Murmur] : no murmur heard [No Edema] : there was no peripheral edema [Soft] : abdomen soft [Non Tender] : non-tender [Non-distended] : non-distended [Normal Bowel Sounds] : normal bowel sounds [Normal Posterior Cervical Nodes] : no posterior cervical lymphadenopathy [Normal Anterior Cervical Nodes] : no anterior cervical lymphadenopathy [No Spinal Tenderness] : no spinal tenderness [No Joint Swelling] : no joint swelling [Grossly Normal Strength/Tone] : grossly normal strength/tone [No Rash] : no rash [Normal Gait] : normal gait [Coordination Grossly Intact] : coordination grossly intact [No Focal Deficits] : no focal deficits [Normal Mood] : the mood was normal [Normal Insight/Judgement] : insight and judgment were intact [de-identified] : friendly [de-identified] : nasal flap healed [de-identified] : defer to GI

## 2024-04-15 NOTE — REVIEW OF SYSTEMS
[Negative] : Heme/Lymph [Recent Change In Weight] : ~T recent weight change [Nasal Discharge] : no nasal discharge [Sore Throat] : no sore throat [FreeTextEntry2] : 3 lb loss

## 2024-04-20 ENCOUNTER — TRANSCRIPTION ENCOUNTER (OUTPATIENT)
Age: 74
End: 2024-04-20

## 2024-04-21 LAB
ALBUMIN SERPL ELPH-MCNC: 4.6 G/DL
ALP BLD-CCNC: 52 U/L
ALT SERPL-CCNC: 49 U/L
ANION GAP SERPL CALC-SCNC: 13 MMOL/L
APPEARANCE: CLEAR
AST SERPL-CCNC: 38 U/L
BACTERIA: NEGATIVE /HPF
BASOPHILS # BLD AUTO: 0.04 K/UL
BASOPHILS NFR BLD AUTO: 0.6 %
BILIRUB SERPL-MCNC: 1.4 MG/DL
BILIRUBIN URINE: NEGATIVE
BLOOD URINE: NEGATIVE
BUN SERPL-MCNC: 17 MG/DL
CALCIUM SERPL-MCNC: 10 MG/DL
CAST: 0 /LPF
CHLORIDE SERPL-SCNC: 99 MMOL/L
CHOLEST SERPL-MCNC: 169 MG/DL
CO2 SERPL-SCNC: 27 MMOL/L
COLOR: YELLOW
CREAT SERPL-MCNC: 1.13 MG/DL
EGFR: 69 ML/MIN/1.73M2
EOSINOPHIL # BLD AUTO: 0.26 K/UL
EOSINOPHIL NFR BLD AUTO: 4 %
EPITHELIAL CELLS: 0 /HPF
ESTIMATED AVERAGE GLUCOSE: 103 MG/DL
GLUCOSE QUALITATIVE U: NEGATIVE MG/DL
GLUCOSE SERPL-MCNC: 132 MG/DL
HBA1C MFR BLD HPLC: 5.2 %
HCT VFR BLD CALC: 43.5 %
HDLC SERPL-MCNC: 42 MG/DL
HGB BLD-MCNC: 15 G/DL
IMM GRANULOCYTES NFR BLD AUTO: 0.8 %
KETONES URINE: NEGATIVE MG/DL
LDLC SERPL CALC-MCNC: 108 MG/DL
LEUKOCYTE ESTERASE URINE: NEGATIVE
LYMPHOCYTES # BLD AUTO: 2.18 K/UL
LYMPHOCYTES NFR BLD AUTO: 33.1 %
MAN DIFF?: NORMAL
MCHC RBC-ENTMCNC: 31.7 PG
MCHC RBC-ENTMCNC: 34.5 GM/DL
MCV RBC AUTO: 92 FL
MICROSCOPIC-UA: NORMAL
MONOCYTES # BLD AUTO: 0.52 K/UL
MONOCYTES NFR BLD AUTO: 7.9 %
NEUTROPHILS # BLD AUTO: 3.53 K/UL
NEUTROPHILS NFR BLD AUTO: 53.6 %
NITRITE URINE: NEGATIVE
NONHDLC SERPL-MCNC: 127 MG/DL
PH URINE: 7
PLATELET # BLD AUTO: 252 K/UL
POTASSIUM SERPL-SCNC: 4.8 MMOL/L
PROT SERPL-MCNC: 7.1 G/DL
PROTEIN URINE: NEGATIVE MG/DL
PSA SERPL-MCNC: 1.7 NG/ML
RBC # BLD: 4.73 M/UL
RBC # FLD: 13.6 %
RED BLOOD CELLS URINE: 1 /HPF
SODIUM SERPL-SCNC: 140 MMOL/L
SPECIFIC GRAVITY URINE: 1.02
TRIGL SERPL-MCNC: 102 MG/DL
TSH SERPL-ACNC: 6.16 UIU/ML
UROBILINOGEN URINE: 0.2 MG/DL
VIT B12 SERPL-MCNC: 612 PG/ML
WBC # FLD AUTO: 6.58 K/UL
WHITE BLOOD CELLS URINE: 0 /HPF

## 2024-05-01 ENCOUNTER — APPOINTMENT (OUTPATIENT)
Dept: SURGICAL ONCOLOGY | Facility: CLINIC | Age: 74
End: 2024-05-01
Payer: MEDICARE

## 2024-05-01 VITALS
WEIGHT: 244 LBS | DIASTOLIC BLOOD PRESSURE: 84 MMHG | BODY MASS INDEX: 32.34 KG/M2 | HEART RATE: 68 BPM | OXYGEN SATURATION: 98 % | SYSTOLIC BLOOD PRESSURE: 140 MMHG | HEIGHT: 73 IN

## 2024-05-01 DIAGNOSIS — C44.321 SQUAMOUS CELL CARCINOMA OF SKIN OF NOSE: ICD-10-CM

## 2024-05-01 PROCEDURE — 99214 OFFICE O/P EST MOD 30 MIN: CPT

## 2024-05-01 NOTE — HISTORY OF PRESENT ILLNESS
[de-identified] : Patient is a 74 y/o male who presents a follow up visit for tip of nose keratoacanthoma. He underwent attempted Mohs surgery by Dr. George who had persistently positive deep margins and referred the pt. for wide excision.   He is status post wide excision with skin graft reconstruction with Dr. Clinton of plastic surgery on 4/7/22.  He completed 6 weeks of radiation at [place] which he tolerated well.  Continues to see derm Dr. Meade for surveillance, last visit a few weeks ago, no new biopsies, denies any new or changing skin lesions.  Surgical Pathology (4/7/22):  1. Skin, superior margin nose, biopsy- Skin negative for carcinoma. 2. Skin, left lateral margin, biopsy- Skin negative for carcinoma. 3. Skin, right lateral margin, biopsy- Skin negative for carcinoma. 4. Skin, upper lip margin, biopsy- Skin negative for carcinoma. 5. Skin, left nasal vault, biopsy - Skin with minute focus suspicious for invasive squamous cell carcinoma, markedly obscured by inflammatory changes, granulation tissue and cautery artifact (see comment). - Cartilage negative for carcinoma. 6. Skin, right nasal vault, biopsy- Skin negative for carcinoma. 7. Skin, intracartilaginous septum, biopsy - Minute focus consistent with invasive squamous cell carcinoma, markedly obscured by inflammatory changes, granulation tissue and cautery artifact (see comment). 8. Skin, right nasal septum, biopsy- Skin negative for carcinoma. 9. Skin, intracartilaginous septum, biopsy- Fibrous tissue negative for carcinoma.  Dermatopathology report (3/16/22): A. Left Tip of Nose: -Surface of acanthotic squamous proliferation, transected.  Patient's past medical history is significant for HTN, Blood clots, Fatty liver.  He has hx of squamous and basal cell carcinomas.  Covid vaccinated w/ booster dose.

## 2024-05-01 NOTE — ASSESSMENT
[FreeTextEntry1] : S/p wide excision w/ nose keratoacanthoma Margins negative on final pathology S/p radiation therapy  Continue dermatologic surveillance Pt wants to f/u prn

## 2024-05-01 NOTE — PHYSICAL EXAM
[Normal] : supple, no neck mass and thyroid not enlarged [Normal Neck Lymph Nodes] : normal neck lymph nodes  [Normal Supraclavicular Lymph Nodes] : normal supraclavicular lymph nodes [Normal Groin Lymph Nodes] : normal groin lymph nodes [Normal Axillary Lymph Nodes] : normal axillary lymph nodes [Normal] : oriented to person, place and time, with appropriate affect [de-identified] : nasolabial flaps well healed. no evidence of recurrence.

## 2024-06-03 NOTE — PATIENT PROFILE ADULT - NSASFUNCLEVELADLAMBULATE_GEN_A_NUR
06-03    136  |  103  |  20.5<H>  ----------------------------<  114<H>  4.1   |  21.0<L>  |  0.67    Ca    8.7      03 Jun 2024 05:00  Phos  4.1     06-03  Mg     2.1     06-03    TPro  6.4<L>  /  Alb  3.5  /  TBili  0.4  /  DBili  x   /  AST  12  /  ALT  7   /  AlkPhos  60  06-03  A1C with Estimated Average Glucose Result: 5.0 % (11-27-23 @ 01:52)   0 = independent

## 2024-06-20 ENCOUNTER — OUTPATIENT (OUTPATIENT)
Dept: OUTPATIENT SERVICES | Facility: HOSPITAL | Age: 74
LOS: 1 days | End: 2024-06-20
Payer: MEDICARE

## 2024-06-20 ENCOUNTER — APPOINTMENT (OUTPATIENT)
Dept: MRI IMAGING | Facility: CLINIC | Age: 74
End: 2024-06-20
Payer: MEDICARE

## 2024-06-20 DIAGNOSIS — Z98.890 OTHER SPECIFIED POSTPROCEDURAL STATES: Chronic | ICD-10-CM

## 2024-06-20 DIAGNOSIS — K86.2 CYST OF PANCREAS: ICD-10-CM

## 2024-06-20 DIAGNOSIS — Z86.018 PERSONAL HISTORY OF OTHER BENIGN NEOPLASM: Chronic | ICD-10-CM

## 2024-06-20 PROCEDURE — 74183 MRI ABD W/O CNTR FLWD CNTR: CPT

## 2024-06-20 PROCEDURE — 74183 MRI ABD W/O CNTR FLWD CNTR: CPT | Mod: 26,MH

## 2024-06-20 PROCEDURE — A9585: CPT

## 2024-07-15 ENCOUNTER — NON-APPOINTMENT (OUTPATIENT)
Age: 74
End: 2024-07-15

## 2024-07-16 ENCOUNTER — APPOINTMENT (OUTPATIENT)
Dept: GASTROENTEROLOGY | Facility: CLINIC | Age: 74
End: 2024-07-16
Payer: MEDICARE

## 2024-07-16 VITALS
DIASTOLIC BLOOD PRESSURE: 89 MMHG | WEIGHT: 244 LBS | SYSTOLIC BLOOD PRESSURE: 171 MMHG | BODY MASS INDEX: 32.34 KG/M2 | HEART RATE: 75 BPM | HEIGHT: 73 IN | OXYGEN SATURATION: 98 %

## 2024-07-16 DIAGNOSIS — K86.2 CYST OF PANCREAS: ICD-10-CM

## 2024-07-16 DIAGNOSIS — K86.89 OTHER SPECIFIED DISEASES OF PANCREAS: ICD-10-CM

## 2024-07-16 PROCEDURE — 99214 OFFICE O/P EST MOD 30 MIN: CPT

## 2024-07-20 PROBLEM — K86.89 DILATED PANCREATIC DUCT: Status: ACTIVE | Noted: 2024-07-20

## 2024-07-25 ENCOUNTER — APPOINTMENT (OUTPATIENT)
Dept: GASTROENTEROLOGY | Facility: CLINIC | Age: 74
End: 2024-07-25

## 2024-09-05 ENCOUNTER — NON-APPOINTMENT (OUTPATIENT)
Age: 74
End: 2024-09-05

## 2024-09-17 ENCOUNTER — NON-APPOINTMENT (OUTPATIENT)
Age: 74
End: 2024-09-17

## 2024-10-22 ENCOUNTER — APPOINTMENT (OUTPATIENT)
Dept: INTERNAL MEDICINE | Facility: CLINIC | Age: 74
End: 2024-10-22
Payer: MEDICARE

## 2024-10-22 VITALS
SYSTOLIC BLOOD PRESSURE: 160 MMHG | HEART RATE: 68 BPM | WEIGHT: 244 LBS | RESPIRATION RATE: 12 BRPM | DIASTOLIC BLOOD PRESSURE: 80 MMHG | OXYGEN SATURATION: 98 % | HEIGHT: 73 IN | BODY MASS INDEX: 32.34 KG/M2

## 2024-10-22 VITALS — SYSTOLIC BLOOD PRESSURE: 136 MMHG | DIASTOLIC BLOOD PRESSURE: 70 MMHG

## 2024-10-22 DIAGNOSIS — I25.10 ATHEROSCLEROTIC HEART DISEASE OF NATIVE CORONARY ARTERY W/OUT ANGINA PECTORIS: ICD-10-CM

## 2024-10-22 DIAGNOSIS — E66.9 OBESITY, UNSPECIFIED: ICD-10-CM

## 2024-10-22 DIAGNOSIS — I10 ESSENTIAL (PRIMARY) HYPERTENSION: ICD-10-CM

## 2024-10-22 PROCEDURE — 90662 IIV NO PRSV INCREASED AG IM: CPT

## 2024-10-22 PROCEDURE — G0008: CPT

## 2024-10-22 PROCEDURE — G2211 COMPLEX E/M VISIT ADD ON: CPT

## 2024-10-22 PROCEDURE — 99214 OFFICE O/P EST MOD 30 MIN: CPT

## 2024-12-11 ENCOUNTER — RX RENEWAL (OUTPATIENT)
Age: 74
End: 2024-12-11

## 2025-01-07 ENCOUNTER — RX RENEWAL (OUTPATIENT)
Age: 75
End: 2025-01-07

## 2025-01-07 RX ORDER — AZELASTINE HYDROCHLORIDE 137 UG/1
137 SPRAY, METERED NASAL
Qty: 1 | Refills: 2 | Status: ACTIVE | COMMUNITY
Start: 2025-01-07 | End: 1900-01-01

## 2025-03-12 ENCOUNTER — NON-APPOINTMENT (OUTPATIENT)
Age: 75
End: 2025-03-12

## 2025-03-12 LAB
HBV SURFACE AB SER QL: REACTIVE
HEPATITIS A IGG ANTIBODY: REACTIVE

## 2025-03-29 ENCOUNTER — RX RENEWAL (OUTPATIENT)
Age: 75
End: 2025-03-29

## 2025-04-30 ENCOUNTER — LABORATORY RESULT (OUTPATIENT)
Age: 75
End: 2025-04-30

## 2025-04-30 ENCOUNTER — APPOINTMENT (OUTPATIENT)
Dept: INTERNAL MEDICINE | Facility: CLINIC | Age: 75
End: 2025-04-30
Payer: MEDICARE

## 2025-04-30 VITALS
SYSTOLIC BLOOD PRESSURE: 132 MMHG | RESPIRATION RATE: 12 BRPM | WEIGHT: 237 LBS | DIASTOLIC BLOOD PRESSURE: 78 MMHG | HEIGHT: 73 IN | BODY MASS INDEX: 31.41 KG/M2 | TEMPERATURE: 98.3 F | OXYGEN SATURATION: 98 % | HEART RATE: 71 BPM

## 2025-04-30 VITALS — SYSTOLIC BLOOD PRESSURE: 120 MMHG | DIASTOLIC BLOOD PRESSURE: 68 MMHG

## 2025-04-30 DIAGNOSIS — E66.9 OBESITY, UNSPECIFIED: ICD-10-CM

## 2025-04-30 DIAGNOSIS — I25.10 ATHEROSCLEROTIC HEART DISEASE OF NATIVE CORONARY ARTERY W/OUT ANGINA PECTORIS: ICD-10-CM

## 2025-04-30 DIAGNOSIS — K76.0 FATTY (CHANGE OF) LIVER, NOT ELSEWHERE CLASSIFIED: ICD-10-CM

## 2025-04-30 DIAGNOSIS — I10 ESSENTIAL (PRIMARY) HYPERTENSION: ICD-10-CM

## 2025-04-30 DIAGNOSIS — Z00.00 ENCOUNTER FOR GENERAL ADULT MEDICAL EXAMINATION W/OUT ABNORMAL FINDINGS: ICD-10-CM

## 2025-04-30 PROCEDURE — G0447 BEHAVIOR COUNSEL OBESITY 15M: CPT

## 2025-04-30 PROCEDURE — 36415 COLL VENOUS BLD VENIPUNCTURE: CPT

## 2025-04-30 PROCEDURE — G0439: CPT

## 2025-05-01 ENCOUNTER — TRANSCRIPTION ENCOUNTER (OUTPATIENT)
Age: 75
End: 2025-05-01

## 2025-05-01 LAB
ALBUMIN SERPL ELPH-MCNC: 4.4 G/DL
ALP BLD-CCNC: 58 U/L
ALT SERPL-CCNC: 48 U/L
ANION GAP SERPL CALC-SCNC: 15 MMOL/L
APPEARANCE: CLEAR
AST SERPL-CCNC: 39 U/L
BACTERIA: NEGATIVE /HPF
BASOPHILS # BLD AUTO: 0.04 K/UL
BASOPHILS NFR BLD AUTO: 0.5 %
BILIRUB SERPL-MCNC: 1.4 MG/DL
BILIRUBIN URINE: ABNORMAL
BLOOD URINE: NEGATIVE
BUN SERPL-MCNC: 23 MG/DL
CALCIUM SERPL-MCNC: 9.6 MG/DL
CAST: 3 /LPF
CHLORIDE SERPL-SCNC: 101 MMOL/L
CHOLEST SERPL-MCNC: 161 MG/DL
CO2 SERPL-SCNC: 25 MMOL/L
COLOR: NORMAL
CREAT SERPL-MCNC: 1.14 MG/DL
EGFRCR SERPLBLD CKD-EPI 2021: 68 ML/MIN/1.73M2
EOSINOPHIL # BLD AUTO: 0.2 K/UL
EOSINOPHIL NFR BLD AUTO: 2.5 %
EPITHELIAL CELLS: 1 /HPF
ESTIMATED AVERAGE GLUCOSE: 114 MG/DL
GLUCOSE QUALITATIVE U: NEGATIVE MG/DL
GLUCOSE SERPL-MCNC: 119 MG/DL
HBA1C MFR BLD HPLC: 5.6 %
HCT VFR BLD CALC: 43.7 %
HDLC SERPL-MCNC: 32 MG/DL
HGB BLD-MCNC: 14.6 G/DL
IMM GRANULOCYTES NFR BLD AUTO: 1.1 %
KETONES URINE: ABNORMAL MG/DL
LDLC SERPL-MCNC: 104 MG/DL
LEUKOCYTE ESTERASE URINE: ABNORMAL
LYMPHOCYTES # BLD AUTO: 2.54 K/UL
LYMPHOCYTES NFR BLD AUTO: 32.3 %
MAN DIFF?: NORMAL
MCHC RBC-ENTMCNC: 31.1 PG
MCHC RBC-ENTMCNC: 33.4 G/DL
MCV RBC AUTO: 93.2 FL
MICROSCOPIC-UA: NORMAL
MONOCYTES # BLD AUTO: 0.64 K/UL
MONOCYTES NFR BLD AUTO: 8.1 %
NEUTROPHILS # BLD AUTO: 4.35 K/UL
NEUTROPHILS NFR BLD AUTO: 55.5 %
NITRITE URINE: NEGATIVE
NONHDLC SERPL-MCNC: 129 MG/DL
PH URINE: 6
PLATELET # BLD AUTO: 388 K/UL
POTASSIUM SERPL-SCNC: 4.3 MMOL/L
PROT SERPL-MCNC: 7.3 G/DL
PROTEIN URINE: 30 MG/DL
PSA SERPL-MCNC: 2.3 NG/ML
RBC # BLD: 4.69 M/UL
RBC # FLD: 13.5 %
RED BLOOD CELLS URINE: 1 /HPF
SODIUM SERPL-SCNC: 141 MMOL/L
SPECIFIC GRAVITY URINE: 1.03
TRIGL SERPL-MCNC: 139 MG/DL
TSH SERPL-ACNC: 5.68 UIU/ML
UROBILINOGEN URINE: 0.2 MG/DL
VIT B12 SERPL-MCNC: 724 PG/ML
WBC # FLD AUTO: 7.86 K/UL
WHITE BLOOD CELLS URINE: 4 /HPF

## 2025-07-08 ENCOUNTER — APPOINTMENT (OUTPATIENT)
Dept: GASTROENTEROLOGY | Facility: CLINIC | Age: 75
End: 2025-07-08
Payer: MEDICARE

## 2025-07-08 ENCOUNTER — NON-APPOINTMENT (OUTPATIENT)
Age: 75
End: 2025-07-08

## 2025-07-08 VITALS
HEART RATE: 88 BPM | SYSTOLIC BLOOD PRESSURE: 166 MMHG | RESPIRATION RATE: 15 BRPM | HEIGHT: 73 IN | OXYGEN SATURATION: 99 % | BODY MASS INDEX: 31.41 KG/M2 | WEIGHT: 237 LBS | DIASTOLIC BLOOD PRESSURE: 81 MMHG

## 2025-07-08 PROCEDURE — 99214 OFFICE O/P EST MOD 30 MIN: CPT

## 2025-07-08 PROCEDURE — G2211 COMPLEX E/M VISIT ADD ON: CPT

## 2025-07-23 ENCOUNTER — OUTPATIENT (OUTPATIENT)
Dept: OUTPATIENT SERVICES | Facility: HOSPITAL | Age: 75
LOS: 1 days | End: 2025-07-23
Payer: MEDICARE

## 2025-07-23 ENCOUNTER — APPOINTMENT (OUTPATIENT)
Dept: MRI IMAGING | Facility: CLINIC | Age: 75
End: 2025-07-23
Payer: MEDICARE

## 2025-07-23 DIAGNOSIS — D49.0 NEOPLASM OF UNSPECIFIED BEHAVIOR OF DIGESTIVE SYSTEM: ICD-10-CM

## 2025-07-23 DIAGNOSIS — Z98.890 OTHER SPECIFIED POSTPROCEDURAL STATES: Chronic | ICD-10-CM

## 2025-07-23 DIAGNOSIS — Z86.018 PERSONAL HISTORY OF OTHER BENIGN NEOPLASM: Chronic | ICD-10-CM

## 2025-07-23 PROCEDURE — 74183 MRI ABD W/O CNTR FLWD CNTR: CPT

## 2025-07-23 PROCEDURE — 74183 MRI ABD W/O CNTR FLWD CNTR: CPT | Mod: 26

## 2025-07-23 PROCEDURE — A9585: CPT

## 2025-08-26 ENCOUNTER — APPOINTMENT (OUTPATIENT)
Dept: GASTROENTEROLOGY | Facility: CLINIC | Age: 75
End: 2025-08-26
Payer: MEDICARE

## 2025-08-26 ENCOUNTER — NON-APPOINTMENT (OUTPATIENT)
Age: 75
End: 2025-08-26

## 2025-08-26 VITALS
RESPIRATION RATE: 15 BRPM | HEIGHT: 73 IN | OXYGEN SATURATION: 99 % | HEART RATE: 88 BPM | BODY MASS INDEX: 31.41 KG/M2 | SYSTOLIC BLOOD PRESSURE: 155 MMHG | WEIGHT: 237 LBS | DIASTOLIC BLOOD PRESSURE: 90 MMHG

## 2025-08-26 DIAGNOSIS — I25.10 ATHEROSCLEROTIC HEART DISEASE OF NATIVE CORONARY ARTERY W/OUT ANGINA PECTORIS: ICD-10-CM

## 2025-08-26 DIAGNOSIS — D49.0 NEOPLASM OF UNSPECIFIED BEHAVIOR OF DIGESTIVE SYSTEM: ICD-10-CM

## 2025-08-26 DIAGNOSIS — K86.89 OTHER SPECIFIED DISEASES OF PANCREAS: ICD-10-CM

## 2025-08-26 PROCEDURE — 99214 OFFICE O/P EST MOD 30 MIN: CPT

## 2025-08-26 PROCEDURE — G2211 COMPLEX E/M VISIT ADD ON: CPT

## (undated) DEVICE — BLANKET WARMER UPPER ADULT

## (undated) DEVICE — STAPLER SKIN FIXED HEAD 35R

## (undated) DEVICE — DRAPE HALF SHEET 40X57"

## (undated) DEVICE — DRSG UNNA BOOT FLEX ELASTIC 4"

## (undated) DEVICE — APPLICATOR COTTON TIP 3" STERILE

## (undated) DEVICE — SUT MONOCRYL 5-0 18" PS-2

## (undated) DEVICE — PACK MINOR WITH LAP

## (undated) DEVICE — WRAP COMPRESSION CALF MED

## (undated) DEVICE — BLANKET WARMER LOWER ADULT

## (undated) DEVICE — DRSG MASTISOL

## (undated) DEVICE — TONGUE DEPRESSOR

## (undated) DEVICE — SUT PLAIN GUT FAST ABSORBING 5-0 PC-1

## (undated) DEVICE — BLADE SAFETY LOCK #15

## (undated) DEVICE — DRSG WEBRIL 4"

## (undated) DEVICE — SUT MONOCRYL 5-0 18" P-3 UNDYED

## (undated) DEVICE — DRAPE LIGHT HANDLE COVER FLEX GREEN

## (undated) DEVICE — COTTONBALL PREPPING LG NS

## (undated) DEVICE — DRSG TEGADERM 4X4.75"

## (undated) DEVICE — DRSG 4X4

## (undated) DEVICE — SUT MONOCRYL 4-0 27" PS-2 UNDYED

## (undated) DEVICE — SUT SOFSILK 2-0 18" C-15

## (undated) DEVICE — DRSG KLING 6"

## (undated) DEVICE — SUT PLAIN GUT 4-0 30" C-13

## (undated) DEVICE — ELCTR BOVIE NEEDLE TIP 2.84"

## (undated) DEVICE — DRAPE 3/4 SHEET W REINFORCEMENT 56X77"

## (undated) DEVICE — WRAP COMPRESSION CALF LG

## (undated) DEVICE — DRSG ACE BANDAGE 4"

## (undated) DEVICE — GLV 8 PROTEXIS

## (undated) DEVICE — DERMACARRIER ZIMMER SKIN GRAFT MESH 1.5:1

## (undated) DEVICE — GLV 7.5 PROTEXIS

## (undated) DEVICE — SUT VICRYL 4-0 27" RB-1 UNDYED

## (undated) DEVICE — PREP BETADINE SPONGE STICKS

## (undated) DEVICE — DRSG XEROFORM 5"

## (undated) DEVICE — BLADE SURGICAL #10 STAINLESS

## (undated) DEVICE — BLADE ZIMMER DERMATONE